# Patient Record
Sex: FEMALE | Race: WHITE | Employment: OTHER | URBAN - METROPOLITAN AREA
[De-identification: names, ages, dates, MRNs, and addresses within clinical notes are randomized per-mention and may not be internally consistent; named-entity substitution may affect disease eponyms.]

---

## 2017-08-30 ENCOUNTER — OFFICE VISIT (OUTPATIENT)
Dept: INTERNAL MEDICINE CLINIC | Age: 82
End: 2017-08-30

## 2017-08-30 ENCOUNTER — HOSPITAL ENCOUNTER (OUTPATIENT)
Dept: LAB | Age: 82
Discharge: HOME | End: 2017-08-30
Payer: MEDICARE

## 2017-08-30 VITALS
BODY MASS INDEX: 27.64 KG/M2 | RESPIRATION RATE: 14 BRPM | DIASTOLIC BLOOD PRESSURE: 76 MMHG | SYSTOLIC BLOOD PRESSURE: 164 MMHG | TEMPERATURE: 98.8 F | HEART RATE: 64 BPM | HEIGHT: 66 IN | OXYGEN SATURATION: 97 % | WEIGHT: 172 LBS

## 2017-08-30 DIAGNOSIS — E03.9 ACQUIRED HYPOTHYROIDISM: ICD-10-CM

## 2017-08-30 DIAGNOSIS — F34.1 DYSTHYMIA: Primary | ICD-10-CM

## 2017-08-30 DIAGNOSIS — Z23 ENCOUNTER FOR IMMUNIZATION: ICD-10-CM

## 2017-08-30 DIAGNOSIS — R29.6 FALLS FREQUENTLY: ICD-10-CM

## 2017-08-30 DIAGNOSIS — M25.561 ACUTE PAIN OF BOTH KNEES: ICD-10-CM

## 2017-08-30 DIAGNOSIS — M25.562 ACUTE PAIN OF BOTH KNEES: ICD-10-CM

## 2017-08-30 DIAGNOSIS — I10 ESSENTIAL HYPERTENSION: ICD-10-CM

## 2017-08-30 PROCEDURE — 80053 COMPREHEN METABOLIC PANEL: CPT

## 2017-08-30 PROCEDURE — 80061 LIPID PANEL: CPT

## 2017-08-30 PROCEDURE — 36415 COLL VENOUS BLD VENIPUNCTURE: CPT

## 2017-08-30 PROCEDURE — 84439 ASSAY OF FREE THYROXINE: CPT

## 2017-08-30 PROCEDURE — 85027 COMPLETE CBC AUTOMATED: CPT

## 2017-08-30 PROCEDURE — 84443 ASSAY THYROID STIM HORMONE: CPT

## 2017-08-30 RX ORDER — GLUCOSAMINE SULFATE 1500 MG
POWDER IN PACKET (EA) ORAL DAILY
COMMUNITY

## 2017-08-30 RX ORDER — LEVOTHYROXINE AND LIOTHYRONINE 57; 13.5 UG/1; UG/1
90 TABLET ORAL DAILY
COMMUNITY
End: 2017-08-30 | Stop reason: SDUPTHER

## 2017-08-30 RX ORDER — SERTRALINE HYDROCHLORIDE 50 MG/1
TABLET, FILM COATED ORAL
Qty: 90 TAB | Refills: 3 | Status: SHIPPED | OUTPATIENT
Start: 2017-08-30 | End: 2018-10-01 | Stop reason: SDUPTHER

## 2017-08-30 RX ORDER — LOSARTAN POTASSIUM 50 MG/1
50 TABLET ORAL 2 TIMES DAILY
COMMUNITY
End: 2017-08-30 | Stop reason: SDUPTHER

## 2017-08-30 RX ORDER — CHOLECALCIFEROL (VITAMIN D3) 25 MCG
TABLET,CHEWABLE ORAL
COMMUNITY
End: 2021-09-23

## 2017-08-30 RX ORDER — SERTRALINE HYDROCHLORIDE 50 MG/1
TABLET, FILM COATED ORAL
Refills: 0 | COMMUNITY
Start: 2017-08-22 | End: 2017-08-30 | Stop reason: SDUPTHER

## 2017-08-30 RX ORDER — LOSARTAN POTASSIUM 50 MG/1
50 TABLET ORAL 2 TIMES DAILY
Qty: 180 TAB | Refills: 3 | Status: SHIPPED | OUTPATIENT
Start: 2017-08-30 | End: 2018-11-14 | Stop reason: SDUPTHER

## 2017-08-30 RX ORDER — LEVOTHYROXINE AND LIOTHYRONINE 57; 13.5 UG/1; UG/1
90 TABLET ORAL DAILY
Qty: 90 TAB | Refills: 3 | Status: SHIPPED | OUTPATIENT
Start: 2017-08-30 | End: 2018-09-29 | Stop reason: SDUPTHER

## 2017-08-30 RX ORDER — LANOLIN ALCOHOL/MO/W.PET/CERES
3 CREAM (GRAM) TOPICAL
COMMUNITY
End: 2021-06-17

## 2017-08-30 RX ORDER — CHOLECALCIFEROL (VITAMIN D3) 125 MCG
100 CAPSULE ORAL DAILY
COMMUNITY
End: 2021-02-09 | Stop reason: ALTCHOICE

## 2017-08-30 NOTE — PROGRESS NOTES
HISTORY OF PRESENT ILLNESS  Gaviota Voss is a 80 y.o. female. HPI   Dysthymia - cont with current dose of zoloft- does not want to increase it or change it- she has been with her family and kids are helping her ; all of the children have helped  She did hurt her finger with a knife   Subjective:   Gaviota Voss is a 80 y.o. female with hypertension. Hypertension ROS: taking medications as instructed, no medication side effects noted, no TIA's, no chest pain on exertion, no dyspnea on exertion, no swelling of ankles. New concerns: has been a little higher- going to get in exercise group, art club, lunch bunch; she has undergone under a lot of stress - it has been down to 139-140 in the past     .   SUBJECTIVE: Gaviota Voss is a 80 y.o. female here for follow up of hypothyroidism. No results found for: TSH, TSH2, TSH3, TSHP, TSHEXT  Thyroid ROS: denies fatigue, weight changes, heat/cold intolerance, bowel/skin changes or CVS symptoms. She is having pain in right knee and feels like buckling and giving away and left knee joint is hurting and her left foot hurts on top of the foot and when she steps on wrong way and it bonnie   She has balance issues and she feels like her feet are numb at day - she can feel her feet during the day- they feel like has tight shoes     Review of Systems   Constitutional: Negative. Negative for chills, diaphoresis, fever, malaise/fatigue and weight loss. HENT: Negative for congestion, nosebleeds and tinnitus. Eyes: Negative for blurred vision, double vision and photophobia. Respiratory: Negative for cough, hemoptysis, sputum production, shortness of breath and wheezing. Cardiovascular: Negative for chest pain, palpitations, orthopnea, claudication, leg swelling and PND. Gastrointestinal: Negative for abdominal pain, blood in stool, constipation, diarrhea, heartburn, melena, nausea and vomiting. Genitourinary: Negative for dysuria, frequency, hematuria and urgency. Musculoskeletal: Negative for back pain, joint pain, myalgias and neck pain. Skin: Negative for itching and rash. Neurological: Negative for dizziness, tingling, sensory change, speech change, focal weakness, weakness and headaches. Endo/Heme/Allergies: Negative for polydipsia. Does not bruise/bleed easily. Psychiatric/Behavioral: Negative for depression. The patient is not nervous/anxious and does not have insomnia. Physical Exam   Constitutional: She is oriented to person, place, and time. She appears well-developed and well-nourished. HENT:   Head: Normocephalic and atraumatic. Right Ear: External ear normal.   Left Ear: External ear normal.   Nose: Nose normal.   Mouth/Throat: Oropharynx is clear and moist.   Neck: Normal range of motion. Neck supple. No JVD present. Carotid bruit is not present. No thyroid mass and no thyromegaly present. Cardiovascular: Normal rate, regular rhythm, S1 normal, S2 normal, normal heart sounds, intact distal pulses and normal pulses. Exam reveals no gallop and no friction rub. No murmur heard. Pulmonary/Chest: Effort normal and breath sounds normal.   Abdominal: Soft. Bowel sounds are normal.   Musculoskeletal: Normal range of motion. Tip of finger has a cut- no infection - but flap is open   Neurological: She is alert and oriented to person, place, and time. She has normal strength. Skin: Skin is warm and dry. Psychiatric: She has a normal mood and affect. Her behavior is normal. Judgment and thought content normal.   Nursing note and vitals reviewed. ASSESSMENT and PLAN  Diagnoses and all orders for this visit:    1. Dysthymia-cont same dose of medicine she is doing stable but we will see since the recent loss of her  of 40 years    2. Essential hypertension- at goal cont current dose of medicine   -     METABOLIC PANEL, COMPREHENSIVE  -     LIPID PANEL    3.  Acquired hypothyroidism- stable  -     TSH 3RD GENERATION  -     T4, FREE    4. Encounter for immunization  -     Tetanus, diphtheria toxoids and acellular pertussis (TDAP) vaccine, in individuals >=7 years, IM    5. Acute pain of both knees  -     REFERRAL TO ORTHOPEDIC SURGERY  -     CBC W/O DIFF    6. Falls frequently- we are going to evaluate the orthopedic to see if the knees are an issue and could be neuropathy as a cause but we will wait and see what neuro says       She did cut her finger and it is not closing so will send to hand for further eval and treatment and gave TDAP  Other orders  -     sertraline (ZOLOFT) 50 mg tablet; TAKE 1 TABLET BY MOUTH EVERY DAY  -     losartan (COZAAR) 50 mg tablet; Take 1 Tab by mouth two (2) times a day. -     thyroid, Pork, (ARMOUR THYROID) 90 mg tablet; Take 1 Tab by mouth daily.       lab results and schedule of future lab studies reviewed with patient  reviewed diet, exercise and weight control  cardiovascular risk and specific lipid/LDL goals reviewed  reviewed medications and side effects in detail

## 2017-08-31 ENCOUNTER — TELEPHONE (OUTPATIENT)
Dept: INTERNAL MEDICINE CLINIC | Age: 82
End: 2017-08-31

## 2017-08-31 DIAGNOSIS — E03.9 ACQUIRED HYPOTHYROIDISM: ICD-10-CM

## 2017-08-31 DIAGNOSIS — I10 ESSENTIAL HYPERTENSION: Primary | ICD-10-CM

## 2017-08-31 DIAGNOSIS — E78.5 DYSLIPIDEMIA: ICD-10-CM

## 2017-08-31 DIAGNOSIS — F34.1 DYSTHYMIA: ICD-10-CM

## 2017-08-31 LAB
ALBUMIN SERPL-MCNC: 4.2 G/DL (ref 3.5–4.7)
ALBUMIN/GLOB SERPL: 1.8 {RATIO} (ref 1.2–2.2)
ALP SERPL-CCNC: 75 IU/L (ref 39–117)
ALT SERPL-CCNC: 17 IU/L (ref 0–32)
AST SERPL-CCNC: 15 IU/L (ref 0–40)
BILIRUB SERPL-MCNC: 0.4 MG/DL (ref 0–1.2)
BUN SERPL-MCNC: 15 MG/DL (ref 8–27)
BUN/CREAT SERPL: 24 (ref 12–28)
CALCIUM SERPL-MCNC: 9.5 MG/DL (ref 8.7–10.3)
CHLORIDE SERPL-SCNC: 107 MMOL/L (ref 96–106)
CHOLEST SERPL-MCNC: 258 MG/DL (ref 100–199)
CO2 SERPL-SCNC: 21 MMOL/L (ref 18–29)
CREAT SERPL-MCNC: 0.62 MG/DL (ref 0.57–1)
ERYTHROCYTE [DISTWIDTH] IN BLOOD BY AUTOMATED COUNT: 13 % (ref 12.3–15.4)
GLOBULIN SER CALC-MCNC: 2.3 G/DL (ref 1.5–4.5)
GLUCOSE SERPL-MCNC: 81 MG/DL (ref 65–99)
HCT VFR BLD AUTO: 40.5 % (ref 34–46.6)
HDLC SERPL-MCNC: 59 MG/DL
HGB BLD-MCNC: 13.8 G/DL (ref 11.1–15.9)
INTERPRETATION, 910389: NORMAL
LDLC SERPL CALC-MCNC: 160 MG/DL (ref 0–99)
MCH RBC QN AUTO: 30.1 PG (ref 26.6–33)
MCHC RBC AUTO-ENTMCNC: 34.1 G/DL (ref 31.5–35.7)
MCV RBC AUTO: 88 FL (ref 79–97)
PLATELET # BLD AUTO: 218 X10E3/UL (ref 150–379)
POTASSIUM SERPL-SCNC: 4.4 MMOL/L (ref 3.5–5.2)
PROT SERPL-MCNC: 6.5 G/DL (ref 6–8.5)
RBC # BLD AUTO: 4.58 X10E6/UL (ref 3.77–5.28)
SODIUM SERPL-SCNC: 144 MMOL/L (ref 134–144)
T4 FREE SERPL-MCNC: 0.9 NG/DL (ref 0.82–1.77)
TRIGL SERPL-MCNC: 197 MG/DL (ref 0–149)
TSH SERPL DL<=0.005 MIU/L-ACNC: 0.93 UIU/ML (ref 0.45–4.5)
VLDLC SERPL CALC-MCNC: 39 MG/DL (ref 5–40)
WBC # BLD AUTO: 8.1 X10E3/UL (ref 3.4–10.8)

## 2017-08-31 NOTE — TELEPHONE ENCOUNTER
Patient daughter needs to speak with nurse regarding her mom. She had appointment yesterday.  The Daughter Stephan Johns has some question for you her no is [phone]

## 2018-01-31 ENCOUNTER — OFFICE VISIT (OUTPATIENT)
Dept: INTERNAL MEDICINE CLINIC | Age: 83
End: 2018-01-31

## 2018-01-31 ENCOUNTER — HOSPITAL ENCOUNTER (OUTPATIENT)
Dept: GENERAL RADIOLOGY | Age: 83
Discharge: HOME | End: 2018-01-31
Attending: INTERNAL MEDICINE
Payer: MEDICARE

## 2018-01-31 VITALS
SYSTOLIC BLOOD PRESSURE: 148 MMHG | DIASTOLIC BLOOD PRESSURE: 65 MMHG | HEIGHT: 66 IN | WEIGHT: 166.4 LBS | OXYGEN SATURATION: 97 % | TEMPERATURE: 97.5 F | RESPIRATION RATE: 14 BRPM | BODY MASS INDEX: 26.74 KG/M2 | HEART RATE: 78 BPM

## 2018-01-31 DIAGNOSIS — Z00.00 MEDICARE ANNUAL WELLNESS VISIT, SUBSEQUENT: Primary | ICD-10-CM

## 2018-01-31 DIAGNOSIS — J18.9 COMMUNITY ACQUIRED PNEUMONIA, UNSPECIFIED LATERALITY: ICD-10-CM

## 2018-01-31 DIAGNOSIS — M79.644 FINGER PAIN, RIGHT: ICD-10-CM

## 2018-01-31 DIAGNOSIS — I10 ESSENTIAL HYPERTENSION: ICD-10-CM

## 2018-01-31 PROCEDURE — 71046 X-RAY EXAM CHEST 2 VIEWS: CPT

## 2018-01-31 RX ORDER — DOXYCYCLINE 100 MG/1
100 CAPSULE ORAL 2 TIMES DAILY
Qty: 20 CAP | Refills: 0 | Status: SHIPPED | OUTPATIENT
Start: 2018-01-31 | End: 2018-03-20

## 2018-01-31 NOTE — PROGRESS NOTES
HISTORY OF PRESENT ILLNESS  Bob Trejo is a 80 y.o. female. Presents with daughter. HPI   Patient reports sore throat started last week. She states she developed sinus congestion, cough, chest congestion. Patient admits to fluctuations in body temperature. She denies having good energy levels or appetite. She notes she has been drinking Ensure. Patient states she was in bed for 3 days because she was not feeling well and the fatigue. Hypertension ROS: taking medications as instructed, no medication side effects noted, no TIA's, no chest pain on exertion, no dyspnea on exertion, no swelling of ankles. New concerns:  Patient's BP in office today is 148/65. Patient reports she fell in lunch room and split open finger in October. She notes she had it bandaged and it healed. Patient notes some pains in her finger. She states she is unable to close her fingers. Review of Systems   HENT: Positive for congestion, ear pain, sinus pain and sore throat. Respiratory: Positive for cough and wheezing. All other systems reviewed and are negative. Physical Exam   Constitutional: She is oriented to person, place, and time. She appears well-developed and well-nourished. HENT:   Head: Normocephalic and atraumatic. Right Ear: External ear normal.   Left Ear: External ear normal.   Nose: Nose normal.   Mouth/Throat: Oropharynx is clear and moist.   Erythema and fluid in both ears. Eyes: Conjunctivae and EOM are normal.   Neck: Normal range of motion. Neck supple. Carotid bruit is not present. No thyroid mass and no thyromegaly present. Cardiovascular: Normal rate, regular rhythm, S1 normal, S2 normal, normal heart sounds and intact distal pulses. Pulmonary/Chest: Effort normal and breath sounds normal.   Tyler expiratory wheezes and rhonchi. Abdominal: Soft. Normal appearance and bowel sounds are normal. There is no hepatosplenomegaly. There is no tenderness.    Musculoskeletal: Normal range of motion. Neurological: She is alert and oriented to person, place, and time. She has normal strength. No cranial nerve deficit or sensory deficit. Coordination normal.   Skin: Skin is warm, dry and intact. No abrasion and no rash noted. Psychiatric: She has a normal mood and affect. Her behavior is normal. Judgment and thought content normal.   Nursing note and vitals reviewed. ASSESSMENT and PLAN  Diagnoses and all orders for this visit:    1. Community acquired pneumonia, unspecified laterality  Strongly suspicious of pneumonia, but will rule out with chest XR. Will treat with   -     XR CHEST PA LAT; Future    2. Essential hypertension  /65 today in office. I do not recommend any change in medications. Suspect BP high because patient not feeling well.   -     doxycycline (MONODOX) 100 mg capsule; Take 1 Cap by mouth two (2) times a day. 3. Finger pain, right  Patient will follow up with orthopedic to discuss finger pains.   -     REFERRAL TO ORTHOPEDIC SURGERY    lab results and schedule of future lab studies reviewed with patient  reviewed diet, exercise and weight control    Written by Freddie Flores, as dictated by Dylan Landry MD.     Current diagnosis and concerns discussed with pt at length. Understands risks and benefits or current treatment plan and medications and accepts the treatment and medication with any possible risks. Pt asks appropriate questions which were answered. Pt instructed to call with any concerns or problems. This is a Subsequent Medicare Annual Wellness Exam (AWV) (Performed 12 months after IPPE or effective date of Medicare Part B enrollment)    I have reviewed the patient's medical history in detail and updated the computerized patient record.      History     Past Medical History:   Diagnosis Date    Depression     Hypercholesterolemia     Thyroid disease       Past Surgical History:   Procedure Laterality Date    HX ROTATOR CUFF REPAIR  HX TONSILLECTOMY       Current Outpatient Prescriptions   Medication Sig Dispense Refill    doxycycline (MONODOX) 100 mg capsule Take 1 Cap by mouth two (2) times a day. 20 Cap 0    cholecalciferol (VITAMIN D3) 1,000 unit cap Take  by mouth daily.  KRILL OIL PO Take  by mouth.  cyanocobalamin, vitamin B-12, 2,500 mcg tab Take  by mouth.  co-enzyme Q-10 (CO Q-10) 100 mg capsule Take 100 mg by mouth daily.  melatonin 3 mg tablet Take  by mouth.  sertraline (ZOLOFT) 50 mg tablet TAKE 1 TABLET BY MOUTH EVERY DAY 90 Tab 3    losartan (COZAAR) 50 mg tablet Take 1 Tab by mouth two (2) times a day. 180 Tab 3    thyroid, Pork, (ARMOUR THYROID) 90 mg tablet Take 1 Tab by mouth daily. 90 Tab 3     Allergies   Allergen Reactions    Demerol [Meperidine] Other (comments)    Pcn [Penicillins] Hives     Family History   Problem Relation Age of Onset    Stroke Mother     Cancer Father      Brain & Lung    Diabetes Sister     Stroke Sister     Diabetes Brother     Diabetes Maternal Grandmother     Cancer Sister     Heart Disease Brother      Social History   Substance Use Topics    Smoking status: Never Smoker    Smokeless tobacco: Never Used    Alcohol use Yes     Patient Active Problem List   Diagnosis Code    Essential hypertension I10    Dysthymia F34.1    Dyslipidemia E78.5    Acquired hypothyroidism E03.9       Depression Risk Factor Screening:   No flowsheet data found. Alcohol Risk Factor Screening: You do not drink alcohol or very rarely. Functional Ability and Level of Safety:   Hearing Loss  Hearing is good. Activities of Daily Living  The home contains: handrails and grab bars  Patient does total self care    Fall Risk  No flowsheet data found.     Abuse Screen  Patient is not abused    Cognitive Screening   Evaluation of Cognitive Function:  Has your family/caregiver stated any concerns about your memory: no  Normal    Patient Care Team   Patient Care Team:  Truong Nieto Briana Hampton MD as PCP - General (Internal Medicine)    Assessment/Plan   Education and counseling provided:  Are appropriate based on today's review and evaluation  End-of-Life planning (with patient's consent)    Diagnoses and all orders for this visit:    1. Community acquired pneumonia, unspecified laterality  -     XR CHEST PA LAT; Future    2. Essential hypertension  -     doxycycline (MONODOX) 100 mg capsule; Take 1 Cap by mouth two (2) times a day.     3. Finger pain, right  -     REFERRAL TO ORTHOPEDIC SURGERY        Health Maintenance Due   Topic Date Due    ZOSTER VACCINE AGE 60>  07/24/1993    GLAUCOMA SCREENING Q2Y  09/24/1998    OSTEOPOROSIS SCREENING (DEXA)  09/24/1998    Pneumococcal 65+ Low/Medium Risk (1 of 2 - PCV13) 09/24/1998    MEDICARE YEARLY EXAM  09/24/1998    Influenza Age 9 to Adult  08/01/2017

## 2018-01-31 NOTE — PATIENT INSTRUCTIONS

## 2018-03-22 ENCOUNTER — OFFICE VISIT (OUTPATIENT)
Dept: INTERNAL MEDICINE CLINIC | Age: 83
End: 2018-03-22

## 2018-03-22 ENCOUNTER — HOSPITAL ENCOUNTER (OUTPATIENT)
Dept: LAB | Age: 83
Discharge: HOME | End: 2018-03-22
Payer: MEDICARE

## 2018-03-22 VITALS
SYSTOLIC BLOOD PRESSURE: 142 MMHG | DIASTOLIC BLOOD PRESSURE: 74 MMHG | RESPIRATION RATE: 12 BRPM | OXYGEN SATURATION: 98 % | HEART RATE: 69 BPM | WEIGHT: 162.2 LBS | HEIGHT: 65 IN | BODY MASS INDEX: 27.02 KG/M2 | TEMPERATURE: 98.3 F

## 2018-03-22 DIAGNOSIS — I83.90 VARICOSITIES OF LEG: ICD-10-CM

## 2018-03-22 DIAGNOSIS — R22.31 MASS OF SKIN OF FINGER OF RIGHT HAND: Primary | ICD-10-CM

## 2018-03-22 DIAGNOSIS — Z01.818 PRE-OPERATIVE CLEARANCE: ICD-10-CM

## 2018-03-22 DIAGNOSIS — I10 ESSENTIAL HYPERTENSION: ICD-10-CM

## 2018-03-22 PROCEDURE — 80048 BASIC METABOLIC PNL TOTAL CA: CPT

## 2018-03-22 NOTE — MR AVS SNAPSHOT
[facility] 
 
 
 [address] [place] [address] [facility] 
[phone] Patient: Truman Garcia MRN: [id number] Visit Information Date & Time Provider Department Dept. Phone Encounter #  
 3/22/2018 10:00 AM Aleida Mercedes NP [facility] [address] [id number] Upcoming Health Maintenance Date Due ZOSTER VACCINE AGE 60> 7/24/1993 GLAUCOMA SCREENING Q2Y 9/24/1998 Bone Densitometry (Dexa) Screening 9/24/1998 Pneumococcal 65+ Low/Medium Risk (1 of 2 - PCV13) 9/24/1998 Influenza Age 5 to Adult 8/1/2017 MEDICARE YEARLY EXAM 2/1/2019 DTaP/Tdap/Td series (2 - Td) 8/30/2027 Allergies as of 3/22/2018  Review Complete On: 3/22/2018 By: Aleida Mercedes NP Severity Noted Reaction Type Reactions Demerol [Meperidine]  01/31/2018    Other (comments) Doesn't remember but states reaction was when she had first child Pcn [Penicillins]  08/30/2017    Hives Current Immunizations  Never Reviewed Name Date Tdap 8/30/2017 Not reviewed this visit You Were Diagnosed With   
  
 Codes Comments Mass of skin of finger of right hand    -  Primary ICD-10-CM: R22.31 
ICD-9-CM: 782.2 Pre-operative clearance     ICD-10-CM: E41.015 ICD-9-CM: V72.84 Essential hypertension     ICD-10-CM: I10 
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  
 26.99 kg/m 2 1.84 m 2 Preferred Pharmacy Pharmacy Name Phone [facility] [phone] Your Updated Medication List  
  
   
This list is accurate as of 3/22/18 10:53 AM.  Always use your most recent med list.  
  
  
  
  
 CO Q-10 100 mg capsule Generic drug:  co-enzyme Q-10 Take 100 mg by mouth daily. cyanocobalamin (vitamin B-12) 2,500 mcg Tab Take  by mouth. KRILL OIL PO Take 1 Cap by mouth daily. losartan 50 mg tablet Commonly known as:  COZAAR Take 1 Tab by mouth two (2) times a day. melatonin 3 mg tablet Take 3 mg by mouth nightly. sertraline 50 mg tablet Commonly known as:  ZOLOFT  
TAKE 1 TABLET BY MOUTH EVERY DAY  
  
 thyroid (Pork) 90 mg tablet Commonly known as:  ARMOUR THYROID Take 1 Tab by mouth daily. VITAMIN D3 1,000 unit Cap Generic drug:  cholecalciferol Take  by mouth daily. We Performed the Following AMB POC EKG ROUTINE W/ 12 LEADS, INTER & REP [85134 CPT(R)] METABOLIC PANEL, BASIC [18003 CPT(R)] REFERRAL TO VASCULAR SURGERY [ZFQ511 Custom] Referral Information Referral ID Referred By Referred To  
  
 [id number] Kenneth Machado MD   
   [facility] [address] Phone: [phone] Fax: [phone] Visits Status Start Date End Date 1 New Request 3/22/18 3/22/19 If your referral has a status of pending review or denied, additional information will be sent to support the outcome of this decision. Patient Instructions Varicose Veins: Care Instructions Your Care Instructions Varicose veins are twisted, enlarged veins near the surface of the skin. They develop most often in the legs and ankles. Some people may be more likely than others to get varicose veins because of aging or hormone changes or because a parent has them.  Being overweight or pregnant can make varicose veins worse. Jobs that require standing for long periods of time also can make them worse. Follow-up care is a key part of your treatment and safety. Be sure to make and go to all appointments, and call your doctor if you are having problems. It's also a good idea to know your test results and keep a list of the medicines you take. How can you care for yourself at home? · Wear compression stockings during the day to help relieve symptoms. They improve blood flow and are the main treatment for varicose veins. Talk to your doctor about which ones to get and where to get them. · Prop up your legs at or above the level of your heart when possible. This helps keep the blood from pooling in your lower legs and improves blood flow to the rest of your body. · Avoid sitting and standing for long periods. This puts added stress on your veins. · Get regular exercise, and control your weight. Walk, bicycle, or swim to improve blood flow in your legs. · If you bump your leg so hard that you know it is likely to bruise, prop up your leg and put ice or a cold pack on the area for 10 to 20 minutes at a time. Try to do this every 1 to 2 hours for the next 3 days (when you are awake) or until the swelling goes down. Put a thin cloth between the ice and your skin. · If you cut or scratch the skin over a vein, it may bleed a lot. Prop up your leg and apply firm pressure with a clean bandage over the site of the bleeding. Continue to apply pressure for a full 15 minutes. Do not check sooner to see if the bleeding has stopped. If the bleeding has not stopped after 15 minutes, apply pressure again for another 15 minutes. You can repeat this up to 3 times for a total of 45 minutes. If you have a blood clot in a varicose vein, you may have tenderness and swelling over the vein. The vein may feel firm. Be sure to call your doctor right away if you have these symptoms.  If your doctor has told you how to care for the clot, follow his or her instructions. Care may include the following: · Prop up your leg and apply heat with a warm, damp cloth or a heating pad set on low (put a towel or cloth between your leg and the heating pad to prevent burns). · Ask your doctor if you can take an over-the-counter pain medicine, such as acetaminophen (Tylenol), ibuprofen (Advil, Motrin), or naproxen (Aleve). Be safe with medicines. Read and follow all instructions on the label. When should you call for help? Call 911 anytime you think you may need emergency care. For example, call if: 
? · You have sudden chest pain and shortness of breath, or you cough up blood. ?Call your doctor now or seek immediate medical care if: 
? · You have signs of a blood clot, such as: 
¨ Pain in your calf, back of the knee, thigh, or groin. ¨ Redness and swelling in your leg or groin. ? · A varicose vein begins to bleed and you cannot stop it. ? · You have a tender lump in your leg. ? · You get an open sore. ? Watch closely for changes in your health, and be sure to contact your doctor if: 
? · Your varicose vein symptoms do not improve with home treatment. Where can you learn more? Go to http://torres-gabrielle.info/. Enter W477 in the search box to learn more about \"Varicose Veins: Care Instructions. \" Current as of: March 20, 2017 Content Version: 11.4 © 0735-5725 [email or web address]. Care instructions adapted under license by [employer] (which disclaims liability or warranty for this information). If you have questions about a medical condition or this instruction, always ask your healthcare professional. [place] [address] any warranty or liability for your use of this information. Introducing [facility]!    
 Joslyn Denton introduces [employer] patient portal. Now you can access parts of your medical record, email your doctor's office, and request medication refills online. 1. In your internet browser, go to [email or web address] 2. Click on the First Time User? Click Here link in the Sign In box. You will see the New Member Sign Up page. 3. Enter your [employer] Access Code exactly as it appears below. You will not need to use this code after youve completed the sign-up process. If you do not sign up before the expiration date, you must request a new code. · [employer] Access Code: [email or web address] Expires: 6/20/2018 10:53 AM 
 
4. Enter the last four digits of your Social Security Number (xxxx) and Date of Birth (mm/dd/yyyy) as indicated and click Submit. You will be taken to the next sign-up page. 5. Create a Solio ID. This will be your [employer] login ID and cannot be changed, so think of one that is secure and easy to remember. 6. Create a [employer] password. You can change your password at any time. 7. Enter your Password Reset Question and Answer. This can be used at a later time if you forget your password. 8. Enter your e-mail address. You will receive e-mail notification when new information is available in [address] 9. Click Sign Up. You can now view and download portions of your medical record. 10. Click the Download Summary menu link to download a portable copy of your medical information. If you have questions, please visit the Frequently Asked Questions section of the [employer] website. Remember, [employer] is NOT to be used for urgent needs. For medical emergencies, dial 911. Now available from your iPhone and Android! Please provide this summary of care documentation to your next provider. Your primary care clinician is listed as Rosalinda Manley. If you have any questions after today's visit, please call [phone].

## 2018-03-22 NOTE — PATIENT INSTRUCTIONS
Varicose Veins: Care Instructions  Your Care Instructions  Varicose veins are twisted, enlarged veins near the surface of the skin. They develop most often in the legs and ankles. Some people may be more likely than others to get varicose veins because of aging or hormone changes or because a parent has them. Being overweight or pregnant can make varicose veins worse. Jobs that require standing for long periods of time also can make them worse. Follow-up care is a key part of your treatment and safety. Be sure to make and go to all appointments, and call your doctor if you are having problems. It's also a good idea to know your test results and keep a list of the medicines you take. How can you care for yourself at home? · Wear compression stockings during the day to help relieve symptoms. They improve blood flow and are the main treatment for varicose veins. Talk to your doctor about which ones to get and where to get them. · Prop up your legs at or above the level of your heart when possible. This helps keep the blood from pooling in your lower legs and improves blood flow to the rest of your body. · Avoid sitting and standing for long periods. This puts added stress on your veins. · Get regular exercise, and control your weight. Walk, bicycle, or swim to improve blood flow in your legs. · If you bump your leg so hard that you know it is likely to bruise, prop up your leg and put ice or a cold pack on the area for 10 to 20 minutes at a time. Try to do this every 1 to 2 hours for the next 3 days (when you are awake) or until the swelling goes down. Put a thin cloth between the ice and your skin. · If you cut or scratch the skin over a vein, it may bleed a lot. Prop up your leg and apply firm pressure with a clean bandage over the site of the bleeding. Continue to apply pressure for a full 15 minutes. Do not check sooner to see if the bleeding has stopped.  If the bleeding has not stopped after 15 minutes, apply pressure again for another 15 minutes. You can repeat this up to 3 times for a total of 45 minutes. If you have a blood clot in a varicose vein, you may have tenderness and swelling over the vein. The vein may feel firm. Be sure to call your doctor right away if you have these symptoms. If your doctor has told you how to care for the clot, follow his or her instructions. Care may include the following:  · Prop up your leg and apply heat with a warm, damp cloth or a heating pad set on low (put a towel or cloth between your leg and the heating pad to prevent burns). · Ask your doctor if you can take an over-the-counter pain medicine, such as acetaminophen (Tylenol), ibuprofen (Advil, Motrin), or naproxen (Aleve). Be safe with medicines. Read and follow all instructions on the label. When should you call for help? Call 911 anytime you think you may need emergency care. For example, call if:  ? · You have sudden chest pain and shortness of breath, or you cough up blood. ?Call your doctor now or seek immediate medical care if:  ? · You have signs of a blood clot, such as:  ¨ Pain in your calf, back of the knee, thigh, or groin. ¨ Redness and swelling in your leg or groin. ? · A varicose vein begins to bleed and you cannot stop it. ? · You have a tender lump in your leg. ? · You get an open sore. ? Watch closely for changes in your health, and be sure to contact your doctor if:  ? · Your varicose vein symptoms do not improve with home treatment. Where can you learn more? Go to http://torres-gabrielle.info/. Enter B536 in the search box to learn more about \"Varicose Veins: Care Instructions. \"  Current as of: March 20, 2017  Content Version: 11.4  © 8530-9225 [employer]. Care instructions adapted under license by [employer] (which disclaims liability or warranty for this information).  If you have questions about a medical condition or this instruction, always ask your healthcare professional. [place] [address] any warranty or liability for your use of this information.

## 2018-03-22 NOTE — PROGRESS NOTES
HISTORY OF PRESENT ILLNESS  Kellee Beckham is a 80 y.o. female. HPI  Kellee Beckham was referred for evaluation by:Dr. Mary Iverson for Pre- Op Evaluation. Please see encounter details and orders for consultative summary. Type of surgery : Right index finger mass excision  Surgery site : Right index finger  Anesthesia type: Local/MAC  Date of procedure:  3/30/2018    Allergies: Allergies   Allergen Reactions    Demerol [Meperidine] Other (comments)     Doesn't remember but states reaction was when she had first child    Pcn [Penicillins] Hives     Latex allergy: no  Prior reactions to anesthesia:  Slow to arouse in past.    Functional status:  she is able to ambulate up 2 flights of step without shortness of breath, chest pain  Prior cardiac evaluation:   None    Subjective:   Kellee Beckham is a 80 y.o. female with hypertension. Hypertension ROS: taking medications as instructed, no medication side effects noted, no TIA's, no chest pain on exertion, no dyspnea on exertion, no swelling of ankles. New concerns: has occasional brief lightheadedness when changing positions abruptly. Complains of bilateral lower leg aching and sense of heaviness after walking short distances; has had issues with significant various veins for years but seem to be more uncomfortable over the past year. Has not seen any medical provider for this recently; denies ankle edema; wears compression hose occasionally. Has noted that feet seem more cold and has noted some transient numbness and tingling in lower legs. Denies leg weakness. Able to walk distances without pain in lower legs but has sensation of heaviness and aching after activity. Current Outpatient Prescriptions   Medication Sig    cholecalciferol (VITAMIN D3) 1,000 unit cap Take  by mouth daily.  KRILL OIL PO Take 1 Cap by mouth daily.  cyanocobalamin, vitamin B-12, 2,500 mcg tab Take  by mouth.     co-enzyme Q-10 (CO Q-10) 100 mg capsule Take 100 mg by mouth daily.    melatonin 3 mg tablet Take 3 mg by mouth nightly.  sertraline (ZOLOFT) 50 mg tablet TAKE 1 TABLET BY MOUTH EVERY DAY    losartan (COZAAR) 50 mg tablet Take 1 Tab by mouth two (2) times a day.  thyroid, Pork, (ARMOUR THYROID) 90 mg tablet Take 1 Tab by mouth daily. No current facility-administered medications for this visit. Past Medical History:   Diagnosis Date    Depression     Hypercholesterolemia     Thyroid disease     hypothyroid     Past Surgical History:   Procedure Laterality Date    HX CATARACT REMOVAL Bilateral     HX DILATION AND CURETTAGE      more than once-can't remember    HX ORTHOPAEDIC Bilateral     sena's neuroma    HX ROTATOR CUFF REPAIR Right     HX TONSILLECTOMY      HX UROLOGICAL      \"bladder lift\"     Social History   Substance Use Topics    Smoking status: Never Smoker    Smokeless tobacco: Never Used    Alcohol use No       Blood pressure 142/74, pulse 69, temperature 98.3 °F (36.8 °C), temperature source Oral, resp. rate 12, height 5' 5\" (1.651 m), weight 162 lb 3.2 oz (73.6 kg), SpO2 98 %. Review of Systems   Constitutional: Negative for chills, fever and malaise/fatigue. HENT: Negative for congestion. Respiratory: Negative for cough, shortness of breath and wheezing. Cardiovascular: Negative for chest pain, palpitations and leg swelling. Musculoskeletal: Positive for joint pain (right index finger) and myalgias (bilateral lower leg aching). Neurological: Positive for dizziness (occasional) and tingling. Negative for headaches. Psychiatric/Behavioral: Negative for depression. The patient is not nervous/anxious. Physical Exam   Constitutional: She is oriented to person, place, and time. She appears well-developed and well-nourished. HENT:   Head: Normocephalic and atraumatic.    Right Ear: External ear normal.   Left Ear: External ear normal.   Nose: Nose normal.   Mouth/Throat: Oropharynx is clear and moist.   Eyes: Conjunctivae are normal.   Neck: Normal range of motion. Neck supple. No thyromegaly present. Cardiovascular: Normal rate, regular rhythm and normal heart sounds. Pulmonary/Chest: Effort normal and breath sounds normal. She has no wheezes. She has no rales. Abdominal: Soft. Bowel sounds are normal. There is no tenderness. There is no rebound and no guarding. Musculoskeletal:        Hands:  Extensive varicosities bilateral lower legs; distal pulses WNL    Right index finger mass distal to PIP joint   Lymphadenopathy:     She has no cervical adenopathy. Neurological: She is alert and oriented to person, place, and time. No cranial nerve deficit. Skin: Skin is warm and dry. Psychiatric: She has a normal mood and affect. Her behavior is normal.   Nursing note and vitals reviewed. ASSESSMENT and PLAN  Diagnoses and all orders for this visit:    1. Mass of skin of finger of right hand    2. Pre-operative clearance - considered medically stable for upcoming Excision of right index finger mass with Local and MAC anesthesia  -     AMB POC EKG ROUTINE W/ 12 LEADS, INTER & REP  -     METABOLIC PANEL, BASIC    3. Essential hypertension --stable  -     METABOLIC PANEL, BASIC    4. Varicosities of leg-- will refer to vascular surgery for further evaluation in light of increasing discomfort.   Does not appear to be experiencing claudication.  -     REFERRAL TO VASCULAR SURGERY      lab results and schedule of future lab studies reviewed with patient  reviewed diet, exercise and weight control  reviewed medications and side effects in detail

## 2018-03-23 LAB
BUN SERPL-MCNC: 16 MG/DL (ref 8–27)
BUN/CREAT SERPL: 23 (ref 12–28)
CALCIUM SERPL-MCNC: 9.3 MG/DL (ref 8.7–10.3)
CHLORIDE SERPL-SCNC: 105 MMOL/L (ref 96–106)
CO2 SERPL-SCNC: 26 MMOL/L (ref 18–29)
CREAT SERPL-MCNC: 0.71 MG/DL (ref 0.57–1)
GFR SERPLBLD CREATININE-BSD FMLA CKD-EPI: 78 ML/MIN/1.73
GFR SERPLBLD CREATININE-BSD FMLA CKD-EPI: 90 ML/MIN/1.73
GLUCOSE SERPL-MCNC: 73 MG/DL (ref 65–99)
POTASSIUM SERPL-SCNC: 4 MMOL/L (ref 3.5–5.2)
SODIUM SERPL-SCNC: 145 MMOL/L (ref 134–144)

## 2018-04-12 ENCOUNTER — OFFICE VISIT (OUTPATIENT)
Dept: INTERNAL MEDICINE CLINIC | Age: 83
End: 2018-04-12

## 2018-04-12 ENCOUNTER — HOSPITAL ENCOUNTER (OUTPATIENT)
Dept: GENERAL RADIOLOGY | Age: 83
Discharge: HOME | End: 2018-04-12
Payer: MEDICARE

## 2018-04-12 VITALS
RESPIRATION RATE: 12 BRPM | DIASTOLIC BLOOD PRESSURE: 82 MMHG | OXYGEN SATURATION: 98 % | HEIGHT: 65 IN | SYSTOLIC BLOOD PRESSURE: 146 MMHG | WEIGHT: 158 LBS | HEART RATE: 58 BPM | TEMPERATURE: 97.7 F | BODY MASS INDEX: 26.33 KG/M2

## 2018-04-12 DIAGNOSIS — G43.109 OPHTHALMIC MIGRAINE: ICD-10-CM

## 2018-04-12 DIAGNOSIS — M79.671 CHRONIC FOOT PAIN, RIGHT: ICD-10-CM

## 2018-04-12 DIAGNOSIS — G89.29 CHRONIC RIGHT HIP PAIN: Primary | ICD-10-CM

## 2018-04-12 DIAGNOSIS — G89.29 CHRONIC FOOT PAIN, RIGHT: ICD-10-CM

## 2018-04-12 DIAGNOSIS — M25.551 CHRONIC RIGHT HIP PAIN: ICD-10-CM

## 2018-04-12 DIAGNOSIS — G89.29 CHRONIC RIGHT HIP PAIN: ICD-10-CM

## 2018-04-12 DIAGNOSIS — M25.551 CHRONIC RIGHT HIP PAIN: Primary | ICD-10-CM

## 2018-04-12 PROCEDURE — 73502 X-RAY EXAM HIP UNI 2-3 VIEWS: CPT

## 2018-04-12 PROCEDURE — 73630 X-RAY EXAM OF FOOT: CPT

## 2018-04-12 NOTE — PROGRESS NOTES
HISTORY OF PRESENT ILLNESS  Luc Morin is a 80 y.o. female. HPI  Patient of Dr Cecil Guallpa who presents with complaints of right hip pain that has been constant for the past 3 weeks; pain begins in right hip joint and radiates into right groin area. Denies any specific injury but has been doing some new exercises. Denies warmth or erythema to area. Denies prior hip issues. Has not taken any OTC medications for current symptoms. Also complains of intermittent pain to right forefoot over the past 2-3 months. 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  Has not noted any edema or erythema to area. Pain worsens with pressure and weightbearing. Noted transient flashing light in left eye this am followed by some tunneling of vision left eye that lasted for several minutes before subsiding and then developed left sided headache. Denies dizziness, vertigo, slurring of speech, dysphagia, weakness. 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 Review of Systems   Constitutional: Negative for chills, fever and malaise/fatigue. HENT: Negative for congestion. Respiratory: Negative for cough and shortness of breath. Cardiovascular: Negative for chest pain, palpitations and leg swelling. Gastrointestinal: Negative for abdominal pain, constipation, diarrhea, nausea and vomiting. Genitourinary: Negative for dysuria and frequency. Musculoskeletal: Positive for joint pain (right hip, right foot). Negative for myalgias. Neurological: Positive for headaches. Negative for dizziness, tingling, focal weakness, seizures and loss of consciousness.      /82 (BP 1 Location: Left arm, BP Patient Position: Sitting)  Pulse (!) 58  Temp 97.7 °F (36.5 °C) (Oral)   Resp 12  Ht 5' 5\" (1.651 m)  Wt 158 lb (71.7 kg) Comment: per pt  SpO2 98%  BMI 26.29 kg/m2  Physical Exam   Constitutional: She is oriented to person, place, and time. She appears well-developed and well-nourished. No distress. HENT:   Head: Normocephalic and atraumatic. Right Ear: External ear normal.   Left Ear: External ear normal.   Nose: Nose normal.   Mouth/Throat: Oropharynx is clear and moist.   Eyes: Conjunctivae are normal. Pupils are equal, round, and reactive to light. Neck: Normal range of motion. Neck supple. No thyromegaly present. Cardiovascular: Normal rate, regular rhythm and normal heart sounds. Pulmonary/Chest: Effort normal and breath sounds normal.   Musculoskeletal:        Right hip: She exhibits decreased range of motion and bony tenderness. Legs:       Right foot: There is tenderness. Feet:    Tenderness right hip trochanteric bursa; mild tenderness to palpation of right forefoot   Lymphadenopathy:     She has no cervical adenopathy. Neurological: She is alert and oriented to person, place, and time. No cranial nerve deficit. Coordination normal.   Psychiatric: She has a normal mood and affect. Her behavior is normal.   Nursing note and vitals reviewed. ASSESSMENT and PLAN  Diagnoses and all orders for this visit:    1. Chronic right hip pain -- will check xrays for possible arthritis; can take OTC Aleve 1 tab twice daily with food for several days; stretching exercises. -     XR HIP RT W OR WO PELV 2-3 VWS; Future    2. Chronic foot pain, right  -     XR FOOT RT MIN 3 V; Future    3. Ophthalmic migraine -- symptoms easing; advised to notify office if symptoms recur or she develops any other symptoms. reviewed diet, exercise and weight control  reviewed medications and side effects in detail  radiology results and schedule of future radiology studies reviewed with patient  This note will not be viewable in [address]

## 2018-04-12 NOTE — MR AVS SNAPSHOT
[facility] 
 
 
 [address] 
[phone] Patient: Luis Antonio Phillips MRN: [id number] BSW:[id number] Visit Information Date & Time Provider Department Dept. Phone Encounter #  
 4/12/2018 11:40 AM Jose Francisco Kwok NP [facility] [address] [id number] Upcoming Health Maintenance Date Due ZOSTER VACCINE AGE 60> 7/24/1993 GLAUCOMA SCREENING Q2Y 9/24/1998 Bone Densitometry (Dexa) Screening 9/24/1998 Pneumococcal 65+ Low/Medium Risk (1 of 2 - PCV13) 9/24/1998 Influenza Age 5 to Adult 8/1/2017 MEDICARE YEARLY EXAM 2/1/2019 DTaP/Tdap/Td series (2 - Td) 8/30/2027 Allergies as of 4/12/2018  Review Complete On: 4/12/2018 By: Jose Francisco Kwok NP Severity Noted Reaction Type Reactions Demerol [Meperidine]  01/31/2018    Other (comments) Doesn't remember but states reaction was when she had first child Pcn [Penicillins]  08/30/2017    Hives Current Immunizations  Never Reviewed Name Date Tdap 8/30/2017 Not reviewed this visit You Were Diagnosed With   
  
 Codes Comments Chronic right hip pain    -  Primary ICD-10-CM: M25.551, G89.29 ICD-9-CM: 719.45, 338.29 Chronic foot pain, right     ICD-10-CM: M79.671, G89.29 ICD-9-CM: 729.5, 338.29 Ophthalmic migraine     ICD-10-CM: V83.649 ICD-9-CM: 346.80 Vitals BP Pulse Temp Resp Height(growth percentile) Weight(growth percentile) 146/82 (BP 1 Location: Left arm, BP Patient Position: Sitting) (!) 58 97.7 °F (36.5 °C) (Oral) 12 5' 5\" (1.651 m) 158 lb (71.7 kg) SpO2 BMI OB Status Smoking Status 98% 26.29 kg/m2 Postmenopausal Never Smoker Vitals History BMI and BSA Data Body Mass Index Body Surface Area  
 26.29 kg/m 2 1.81 m 2 Preferred Pharmacy Pharmacy Name Phone [facility] [address] [phone] Your Updated Medication List  
  
   
This list is accurate as of 4/12/18 12:03 PM.  Always use your most recent med list.  
  
  
  
  
 CO Q-10 100 mg capsule Generic drug:  co-enzyme Q-10 Take 100 mg by mouth daily. cyanocobalamin (vitamin B-12) 2,500 mcg Tab Take  by mouth. KRILL OIL PO Take 1 Cap by mouth daily. losartan 50 mg tablet Commonly known as:  COZAAR Take 1 Tab by mouth two (2) times a day. melatonin 3 mg tablet Take 3 mg by mouth nightly. sertraline 50 mg tablet Commonly known as:  ZOLOFT  
TAKE 1 TABLET BY MOUTH EVERY DAY  
  
 thyroid (Pork) 90 mg tablet Commonly known as:  ARMOUR THYROID Take 1 Tab by mouth daily. VITAMIN D3 1,000 unit Cap Generic drug:  cholecalciferol Take  by mouth daily. To-Do List   
 04/12/2018 Imaging:  XR FOOT RT MIN 3 V   
  
 04/12/2018 Imaging:  XR HIP RT W OR WO PELV 2-3 VWS Patient Instructions Foot Pain: Care Instructions Your Care Instructions Foot injuries that cause pain and swelling are fairly common. Almost all sports or home repair projects can cause a misstep that ends up as foot pain. Normal wear and tear, especially as you get older, also can cause foot pain. Most minor foot injuries will heal on their own, and home treatment is usually all you need to do. If you have a severe injury, you may need tests and treatment. Follow-up care is a key part of your treatment and safety. Be sure to make and go to all appointments, and call your doctor if you are having problems. It's also a good idea to know your test results and keep a list of the medicines you take. How can you care for yourself at home? · Take pain medicines exactly as directed. ¨ If the doctor gave you a prescription medicine for pain, take it as prescribed. ¨ If you are not taking a prescription pain medicine, ask your doctor if you can take an over-the-counter medicine. · Rest and protect your foot. Take a break from any activity that may cause pain. · Put ice or a cold pack on your foot for 10 to 20 minutes at a time. Put a thin cloth between the ice and your skin. · Prop up the sore foot on a pillow when you ice it or anytime you sit or lie down during the next 3 days. Try to keep it above the level of your heart. This will help reduce swelling. · Your doctor may recommend that you wrap your foot with an elastic bandage. Keep your foot wrapped for as long as your doctor advises. · If your doctor recommends crutches, use them as directed. · Wear roomy footwear. · As soon as pain and swelling end, begin gentle exercises of your foot. Your doctor can tell you which exercises will help. When should you call for help? Call 911 anytime you think you may need emergency care. For example, call if: 
? · Your foot turns pale, white, blue, or cold. ?Call your doctor now or seek immediate medical care if: 
? · You cannot move or stand on your foot. ? · Your foot looks twisted or out of its normal position. ? · Your foot is not stable when you step down. ? · You have signs of infection, such as: 
¨ Increased pain, swelling, warmth, or redness. ¨ Red streaks leading from the sore area. ¨ Pus draining from a place on your foot. ¨ A fever. ? · Your foot is numb or tingly. ? Watch closely for changes in your health, and be sure to contact your doctor if: 
? · You do not get better as expected. ? · You have bruises from an injury that last longer than 2 weeks. Where can you learn more? Go to http://torres-gabrielle.info/. Enter M122 in the search box to learn more about \"Foot Pain: Care Instructions. \" Current as of: March 21, 2017 Content Version: 11.4 © 5243-0066 mention.  Care instructions adapted under license by [employer] (which disclaims liability or warranty for this information). If you have questions about a medical condition or this instruction, always ask your healthcare professional. [address] any warranty or liability for your use of this information. Hip Pain: Care Instructions Your Care Instructions Hip pain may be caused by many things, including overuse, a fall, or a twisting movement. Another cause of hip pain is arthritis. Your pain may increase when you stand up, walk, or squat. The pain may come and go or may be constant. Home treatment can help relieve hip pain, swelling, and stiffness. If your pain is ongoing, you may need more tests and treatment. Follow-up care is a key part of your treatment and safety. Be sure to make and go to all appointments, and call your doctor if you are having problems. It's also a good idea to know your test results and keep a list of the medicines you take. How can you care for yourself at home? · Take pain medicines exactly as directed. ¨ If the doctor gave you a prescription medicine for pain, take it as prescribed. ¨ If you are not taking a prescription pain medicine, ask your doctor if you can take an over-the-counter medicine. · Rest and protect your hip. Take a break from any activity, including standing or walking, that may cause pain. · Put ice or a cold pack against your hip for 10 to 20 minutes at a time. Try to do this every 1 to 2 hours for the next 3 days (when you are awake) or until the swelling goes down. Put a thin cloth between the ice and your skin. · Sleep on your healthy side with a pillow between your knees, or sleep on your back with pillows under your knees. · If there is no swelling, you can put moist heat, a heating pad, or a warm cloth on your hip. Do gentle stretching exercises to help keep your hip flexible. · Learn how to prevent falls. Have your vision and hearing checked regularly. Wear slippers or shoes with a nonskid sole. · Stay at a healthy weight. · Wear comfortable shoes. When should you call for help? Call 911 anytime you think you may need emergency care. For example, call if: 
? · You have sudden chest pain and shortness of breath, or you cough up blood. ? · You are not able to stand or walk or bear weight. ? · Your buttocks, legs, or feet feel numb or tingly. ? · Your leg or foot is cool or pale or changes color. ? · You have severe pain. ?Call your doctor now or seek immediate medical care if: 
? · You have signs of infection, such as: 
¨ Increased pain, swelling, warmth, or redness in the hip area. ¨ Red streaks leading from the hip area. ¨ Pus draining from the hip area. ¨ A fever. ? · You have signs of a blood clot, such as: 
¨ Pain in your calf, back of the knee, thigh, or groin. ¨ Redness and swelling in your leg or groin. ? · You are not able to bend, straighten, or move your leg normally. ? · You have trouble urinating or having bowel movements. ? Watch closely for changes in your health, and be sure to contact your doctor if: 
? · You do not get better as expected. Where can you learn more? Go to http://torres-gabrielle.info/. Enter Y255 in the search box to learn more about \"Hip Pain: Care Instructions. \" Current as of: March 20, 2017 Content Version: 11.4 © 6216-7920 [employer]. Care instructions adapted under license by [employer] (which disclaims liability or warranty for this information). If you have questions about a medical condition or this instruction, always ask your healthcare professional. [place] [address] any warranty or liability for your use of this information. Migraine Aura Without a Headache: Care Instructions Your Care Instructions A migraine aura without a headache is a type of migraine. When you have an aura, you may see spots, wavy lines, or flashing lights.  Your hands, arms, or face may tingle or feel numb. But unlike other migraines, a headache doesn't follow the aura. Some people have both types of migraines. Although they sometimes have an aura without the headache, at other times they may get a headache after an aura. Without treatment, migraines can last from 4 hours to a few days. Medicines can help prevent migraines or stop them once they have started. Your doctor can help you find which ones work best for you. Follow-up care is a key part of your treatment and safety. Be sure to make and go to all appointments, and call your doctor if you are having problems. It's also a good idea to know your test results and keep a list of the medicines you take. How can you care for yourself at home? · Do not drive if you have taken a prescription pain medicine. · Rest in a quiet, dark room until your aura or headache is gone. Close your eyes. Try to relax or go to sleep. Don't watch TV or read. · If you get a headache, put a cold, moist cloth or cold pack on the painful area for 10 to 20 minutes at a time. Put a thin cloth between the cold pack and your skin. · Use a warm, moist towel or a heating pad set on low. This can relax tight shoulder and neck muscles. · Have someone gently massage your neck and shoulders. · Be safe with medicines. Take your medicines exactly as prescribed. Call your doctor if you think you are having a problem with your medicine. You will get more details on the specific medicines your doctor prescribes. · Be careful not to take medicine more often than the instructions allow. This may cause worse or more frequent auras or headaches when the medicine wears off. To prevent migraines · Keep a diary so you can figure out what triggers your auras or headaches. Avoiding triggers may help you prevent migraines.  Record when each aura or headache began, how long it lasted, and what the symptoms were like. Write down any other symptoms you had with the aura. These may include nausea or sensitivity to bright light or loud noise. Note if the aura or headache occurred near your period. List anything that might have triggered the aura. Triggers may include certain foods (chocolate, cheese, wine) or odors, smoke, bright light, stress, or lack of sleep. · If your doctor has prescribed medicine for your migraines, take it as directed. You may have medicine that you take only when you get a migraine and medicine that you take all the time to help prevent migraines. ¨ If your doctor has prescribed medicine for when you get migraines, take it at the first sign of an aura, unless your doctor has given you other instructions. ¨ If your doctor has prescribed medicine to prevent migraines, take it exactly as prescribed. Call your doctor if you think you are having a problem with your medicine. · Find healthy ways to deal with stress. Migraines are most common during or right after stressful times. Take time to relax before and after you do something that has caused a migraine in the past. 
· Get plenty of sleep and exercise. · Eat regular meals, and avoid foods and drinks that often trigger migraines. These include chocolate and alcohol, especially red wine and port. Chemicals used in food, such as aspartame and monosodium glutamate (MSG), also can trigger migraines. So can some food additives, such as those found in hot dogs, augustin, cold cuts, aged cheeses, and pickled foods. · Limit caffeine by not drinking too much coffee, tea, or soda. But don't quit caffeine suddenly, because that can also give you migraines. · Do not smoke or allow others to smoke around you. If you need help quitting, talk to your doctor about stop-smoking programs and medicines. These can increase your chances of quitting for good.  
· If you are taking birth control pills or hormone therapy, talk to your doctor about whether they are triggering your migraines. When should you call for help? Call 911 anytime you think you may need emergency care. For example, call if: 
? · You have symptoms of a stroke. These may include: 
¨ Sudden numbness, tingling, weakness, or loss of movement in your face, arm, or leg, especially on only one side of your body. ¨ Sudden vision changes. ¨ Sudden trouble speaking. ¨ Sudden confusion or trouble understanding simple statements. ¨ Sudden problems with walking or balance. ¨ A sudden, severe headache that is different from past headaches. ?Call your doctor now or seek immediate medical care if: 
? · You have new or worse nausea and vomiting. ? · You have a new or higher fever. ? · Your headache gets much worse. ? Watch closely for changes in your health, and be sure to contact your doctor if: 
? · You are not getting better after 2 days (48 hours). Where can you learn more? Go to http://torres-gabrielle.info/. Enter 415 [id number] in the search box to learn more about \"Migraine Aura Without a Headache: Care Instructions. \" Current as of: October 14, 2016 Content Version: 11.4 © 2329-2932 [employer] - is now [employer]. Care instructions adapted under license by [employer] (which disclaims liability or warranty for this information). If you have questions about a medical condition or this instruction, always ask your healthcare professional. [place] [address] any warranty or liability for your use of this information. Introducing [facility]! Lynda Appiah introduces [employer] patient portal. Now you can access parts of your medical record, email your doctor's office, and request medication refills online. 1. In your internet browser, go to [email or web address] 2. Click on the First Time User? Click Here link in the Sign In box. You will see the New Member Sign Up page. 3. Enter your StoryBlender Access Code exactly as it appears below. You will not need to use this code after youve completed the sign-up process. If you do not sign up before the expiration date, you must request a new code. · StoryBlender Access Code: [email or web address] Expires: 6/20/2018 10:53 AM 
 
4. Enter the last four digits of your Social Security Number (xxxx) and Date of Birth (mm/dd/yyyy) as indicated and click Submit. You will be taken to the next sign-up page. 5. Create a StoryBlender ID. This will be your StoryBlender login ID and cannot be changed, so think of one that is secure and easy to remember. 6. Create a StoryBlender password. You can change your password at any time. 7. Enter your Password Reset Question and Answer. This can be used at a later time if you forget your password. 8. Enter your e-mail address. You will receive e-mail notification when new information is available in [address] 9. Click Sign Up. You can now view and download portions of your medical record. 10. Click the Download Summary menu link to download a portable copy of your medical information. If you have questions, please visit the Frequently Asked Questions section of the StoryBlender website. Remember, StoryBlender is NOT to be used for urgent needs. For medical emergencies, dial 911. Now available from your iPhone and Android! Please provide this summary of care documentation to your next provider. Your primary care clinician is listed as Cem Hart. If you have any questions after today's visit, please call [phone].

## 2018-04-12 NOTE — PATIENT INSTRUCTIONS
Foot Pain: Care Instructions  Your Care Instructions  Foot injuries that cause pain and swelling are fairly common. Almost all sports or home repair projects can cause a misstep that ends up as foot pain. Normal wear and tear, especially as you get older, also can cause foot pain. Most minor foot injuries will heal on their own, and home treatment is usually all you need to do. If you have a severe injury, you may need tests and treatment. Follow-up care is a key part of your treatment and safety. Be sure to make and go to all appointments, and call your doctor if you are having problems. It's also a good idea to know your test results and keep a list of the medicines you take. How can you care for yourself at home? · Take pain medicines exactly as directed. ¨ If the doctor gave you a prescription medicine for pain, take it as prescribed. ¨ If you are not taking a prescription pain medicine, ask your doctor if you can take an over-the-counter medicine. · Rest and protect your foot. Take a break from any activity that may cause pain. · Put ice or a cold pack on your foot for 10 to 20 minutes at a time. Put a thin cloth between the ice and your skin. · Prop up the sore foot on a pillow when you ice it or anytime you sit or lie down during the next 3 days. Try to keep it above the level of your heart. This will help reduce swelling. · Your doctor may recommend that you wrap your foot with an elastic bandage. Keep your foot wrapped for as long as your doctor advises. · If your doctor recommends crutches, use them as directed. · Wear roomy footwear. · As soon as pain and swelling end, begin gentle exercises of your foot. Your doctor can tell you which exercises will help. When should you call for help? Call 911 anytime you think you may need emergency care. For example, call if:  ? · Your foot turns pale, white, blue, or cold.    ?Call your doctor now or seek immediate medical care if:  ? · You cannot move or stand on your foot. ? · Your foot looks twisted or out of its normal position. ? · Your foot is not stable when you step down. ? · You have signs of infection, such as:  ¨ Increased pain, swelling, warmth, or redness. ¨ Red streaks leading from the sore area. ¨ Pus draining from a place on your foot. ¨ A fever. ? · Your foot is numb or tingly. ? Watch closely for changes in your health, and be sure to contact your doctor if:  ? · You do not get better as expected. ? · You have bruises from an injury that last longer than 2 weeks. Where can you learn more? Go to http://torres-gabrielle.info/. Enter Z733 in the search box to learn more about \"Foot Pain: Care Instructions. \"  Current as of: March 21, 2017  Content Version: 11.4  © 8824-9339 [employer]. Care instructions adapted under license by [employer] (which disclaims liability or warranty for this information). If you have questions about a medical condition or this instruction, always ask your healthcare professional. [place] [address] any warranty or liability for your use of this information. Hip Pain: Care Instructions  Your Care Instructions    Hip pain may be caused by many things, including overuse, a fall, or a twisting movement. Another cause of hip pain is arthritis. Your pain may increase when you stand up, walk, or squat. The pain may come and go or may be constant. Home treatment can help relieve hip pain, swelling, and stiffness. If your pain is ongoing, you may need more tests and treatment. Follow-up care is a key part of your treatment and safety. Be sure to make and go to all appointments, and call your doctor if you are having problems. It's also a good idea to know your test results and keep a list of the medicines you take. How can you care for yourself at home? · Take pain medicines exactly as directed.   ¨ If the doctor gave you a prescription medicine for pain, take it as prescribed. ¨ If you are not taking a prescription pain medicine, ask your doctor if you can take an over-the-counter medicine. · Rest and protect your hip. Take a break from any activity, including standing or walking, that may cause pain. · Put ice or a cold pack against your hip for 10 to 20 minutes at a time. Try to do this every 1 to 2 hours for the next 3 days (when you are awake) or until the swelling goes down. Put a thin cloth between the ice and your skin. · Sleep on your healthy side with a pillow between your knees, or sleep on your back with pillows under your knees. · If there is no swelling, you can put moist heat, a heating pad, or a warm cloth on your hip. Do gentle stretching exercises to help keep your hip flexible. · Learn how to prevent falls. Have your vision and hearing checked regularly. Wear slippers or shoes with a nonskid sole. · Stay at a healthy weight. · Wear comfortable shoes. When should you call for help? Call 911 anytime you think you may need emergency care. For example, call if:  ? · You have sudden chest pain and shortness of breath, or you cough up blood. ? · You are not able to stand or walk or bear weight. ? · Your buttocks, legs, or feet feel numb or tingly. ? · Your leg or foot is cool or pale or changes color. ? · You have severe pain. ?Call your doctor now or seek immediate medical care if:  ? · You have signs of infection, such as:  ¨ Increased pain, swelling, warmth, or redness in the hip area. ¨ Red streaks leading from the hip area. ¨ Pus draining from the hip area. ¨ A fever. ? · You have signs of a blood clot, such as:  ¨ Pain in your calf, back of the knee, thigh, or groin. ¨ Redness and swelling in your leg or groin. ? · You are not able to bend, straighten, or move your leg normally. ? · You have trouble urinating or having bowel movements. ? Watch closely for changes in your health, and be sure to contact your doctor if:  ? · You do not get better as expected. Where can you learn more? Go to http://torres-gabrielle.info/. Enter U759 in the search box to learn more about \"Hip Pain: Care Instructions. \"  Current as of: March 20, 2017  Content Version: 11.4  © 9806-1321 [employer]. Care instructions adapted under license by [employer] (which disclaims liability or warranty for this information). If you have questions about a medical condition or this instruction, always ask your healthcare professional. [address] any warranty or liability for your use of this information. Migraine Aura Without a Headache: Care Instructions  Your Care Instructions    A migraine aura without a headache is a type of migraine. When you have an aura, you may see spots, wavy lines, or flashing lights. Your hands, arms, or face may tingle or feel numb. But unlike other migraines, a headache doesn't follow the aura. Some people have both types of migraines. Although they sometimes have an aura without the headache, at other times they may get a headache after an aura. Without treatment, migraines can last from 4 hours to a few days. Medicines can help prevent migraines or stop them once they have started. Your doctor can help you find which ones work best for you. Follow-up care is a key part of your treatment and safety. Be sure to make and go to all appointments, and call your doctor if you are having problems. It's also a good idea to know your test results and keep a list of the medicines you take. How can you care for yourself at home? · Do not drive if you have taken a prescription pain medicine. · Rest in a quiet, dark room until your aura or headache is gone. Close your eyes. Try to relax or go to sleep. Don't watch TV or read. · If you get a headache, put a cold, moist cloth or cold pack on the painful area for 10 to 20 minutes at a time.  Put a thin cloth between the cold pack and your skin. · Use a warm, moist towel or a heating pad set on low. This can relax tight shoulder and neck muscles. · Have someone gently massage your neck and shoulders. · Be safe with medicines. Take your medicines exactly as prescribed. Call your doctor if you think you are having a problem with your medicine. You will get more details on the specific medicines your doctor prescribes. · Be careful not to take medicine more often than the instructions allow. This may cause worse or more frequent auras or headaches when the medicine wears off. To prevent migraines  · Keep a diary so you can figure out what triggers your auras or headaches. Avoiding triggers may help you prevent migraines. Record when each aura or headache began, how long it lasted, and what the symptoms were like. Write down any other symptoms you had with the aura. These may include nausea or sensitivity to bright light or loud noise. Note if the aura or headache occurred near your period. List anything that might have triggered the aura. Triggers may include certain foods (chocolate, cheese, wine) or odors, smoke, bright light, stress, or lack of sleep. · If your doctor has prescribed medicine for your migraines, take it as directed. You may have medicine that you take only when you get a migraine and medicine that you take all the time to help prevent migraines. ¨ If your doctor has prescribed medicine for when you get migraines, take it at the first sign of an aura, unless your doctor has given you other instructions. ¨ If your doctor has prescribed medicine to prevent migraines, take it exactly as prescribed. Call your doctor if you think you are having a problem with your medicine. · Find healthy ways to deal with stress. Migraines are most common during or right after stressful times.  Take time to relax before and after you do something that has caused a migraine in the past.  · Get plenty of sleep and exercise. · Eat regular meals, and avoid foods and drinks that often trigger migraines. These include chocolate and alcohol, especially red wine and port. Chemicals used in food, such as aspartame and monosodium glutamate (MSG), also can trigger migraines. So can some food additives, such as those found in hot dogs, augustin, cold cuts, aged cheeses, and pickled foods. · Limit caffeine by not drinking too much coffee, tea, or soda. But don't quit caffeine suddenly, because that can also give you migraines. · Do not smoke or allow others to smoke around you. If you need help quitting, talk to your doctor about stop-smoking programs and medicines. These can increase your chances of quitting for good. · If you are taking birth control pills or hormone therapy, talk to your doctor about whether they are triggering your migraines. When should you call for help? Call 911 anytime you think you may need emergency care. For example, call if:  ? · You have symptoms of a stroke. These may include:  ¨ Sudden numbness, tingling, weakness, or loss of movement in your face, arm, or leg, especially on only one side of your body. ¨ Sudden vision changes. ¨ Sudden trouble speaking. ¨ Sudden confusion or trouble understanding simple statements. ¨ Sudden problems with walking or balance. ¨ A sudden, severe headache that is different from past headaches. ?Call your doctor now or seek immediate medical care if:  ? · You have new or worse nausea and vomiting. ? · You have a new or higher fever. ? · Your headache gets much worse. ? Watch closely for changes in your health, and be sure to contact your doctor if:  ? · You are not getting better after 2 days (48 hours). Where can you learn more? Go to http://torres-gabrielle.info/. Enter [phone] [id number] in the search box to learn more about \"Migraine Aura Without a Headache: Care Instructions. \"  Current as of: October 14, 2016  Content Version: 11.4  © 4364-2817 [employer]. Care instructions adapted under license by [employer] (which disclaims liability or warranty for this information). If you have questions about a medical condition or this instruction, always ask your healthcare professional. [address] any warranty or liability for your use of this information.

## 2018-04-13 NOTE — PROGRESS NOTES
I called pt, no answer. LM on VM to call back for test results.  When pt calls back notify of the above message per NP.

## 2018-04-16 ENCOUNTER — TELEPHONE (OUTPATIENT)
Dept: INTERNAL MEDICINE CLINIC | Age: 83
End: 2018-04-16

## 2018-04-16 NOTE — TELEPHONE ENCOUNTER
----- Message from Cruzito Hartley sent at 4/16/2018  4:17 PM EDT -----  Regarding: HORACIO Jeronimo/ Telephone  Patient returning a call from [facility].  Contact is [phone]

## 2018-04-17 NOTE — TELEPHONE ENCOUNTER
I called pt back, no answer. LM on  returning call. When pt calls back notify per NP pt doesn't have a fracture in her foot. Right foot big toe shows some arthritis. 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

## 2018-04-19 NOTE — TELEPHONE ENCOUNTER
Pt was informed of imaging results. Advise to call office back if sx get worse. Pt verbalized understanding.

## 2018-04-19 NOTE — TELEPHONE ENCOUNTER
----- Message from Magalys Ayon sent at 4/19/2018 11:14 AM EDT -----  Regarding: HORACIO Jeronimo/Telephone  Pt is returning a call from [facility] regarding x-rays. The  best contact is [phone]. Numerous missed calls.

## 2018-05-03 ENCOUNTER — TELEPHONE (OUTPATIENT)
Dept: INTERNAL MEDICINE CLINIC | Age: 83
End: 2018-05-03

## 2018-05-03 NOTE — TELEPHONE ENCOUNTER
Patient is requesting to have a script for the shingles vaccine sent to her [email or web address] on [address] . Patient state she believes she's having a break out on her chest.       Patient can be reached at [phone]

## 2018-05-04 NOTE — TELEPHONE ENCOUNTER
L/vm for pt if she is having shingles symptoms cannot receive vaccine and needs to be seen in office. Return call to schedule an appointment.

## 2018-05-08 ENCOUNTER — OFFICE VISIT (OUTPATIENT)
Dept: INTERNAL MEDICINE CLINIC | Age: 83
End: 2018-05-08

## 2018-05-08 VITALS
TEMPERATURE: 98.3 F | WEIGHT: 161 LBS | OXYGEN SATURATION: 98 % | RESPIRATION RATE: 14 BRPM | HEART RATE: 64 BPM | DIASTOLIC BLOOD PRESSURE: 67 MMHG | SYSTOLIC BLOOD PRESSURE: 164 MMHG | HEIGHT: 65 IN | BODY MASS INDEX: 26.82 KG/M2

## 2018-05-08 DIAGNOSIS — M25.551 RIGHT HIP PAIN: ICD-10-CM

## 2018-05-08 DIAGNOSIS — M25.559 ARTHRALGIA OF HIP, UNSPECIFIED LATERALITY: ICD-10-CM

## 2018-05-08 DIAGNOSIS — E03.9 ACQUIRED HYPOTHYROIDISM: ICD-10-CM

## 2018-05-08 DIAGNOSIS — M79.671 RIGHT FOOT PAIN: ICD-10-CM

## 2018-05-08 DIAGNOSIS — F34.1 DYSTHYMIA: ICD-10-CM

## 2018-05-08 DIAGNOSIS — I10 ESSENTIAL HYPERTENSION: Primary | ICD-10-CM

## 2018-05-08 DIAGNOSIS — I87.2 VENOUS INSUFFICIENCY: ICD-10-CM

## 2018-05-08 NOTE — PROGRESS NOTES
HISTORY OF PRESENT ILLNESS  Pineda Velez is a 80 y.o. female. HPI   Patient expresses concerns about shingles. Patient reports onset of burning and itching rash 3-4 days ago. Patient notes the rash is on left low back. Patient notes she followed up with pharmacy for shingles vaccine. Patient notes the rash is much better. Denies using new detergents or soaps. Patient notes she did buy new plants, but the rash started before she got the plants. Patient had tried to use a makeup on the rash, but it did not help. Patient reports mood stable. She continues Zoloft 50 mg. Patient reports varicose veins have been causing more pains. Imaging shows patient has mild arthritis on hip and foot. Patient notes she is exercising regularly. She states right hip pain has been getting worse. Patient states right foot pain has gotten worse. She notes she had nerve clipped in her foot, but it returned and started to hurt more. Patient reports she had fallen with plate in her hand and ripped the skin in hand. She had worn bandage and the skin healed, but she developed pain and a lump beneath the skin. Patient had finger operated on and it was not tendonitis. She notes her finger and hand are still tight, but she is able to use it. Hypertension ROS: taking medications as instructed, no medication side effects noted, no TIA's, no chest pain on exertion, no dyspnea on exertion, no swelling of ankles. New concerns:  Patient's BP in office today is 164/67.   hypothyroidism. Lab Results   Component Value Date/Time    TSH 0.926 08/30/2017 12:41 PM     Thyroid ROS: denies fatigue, weight changes, heat/cold intolerance, bowel/skin changes or CVS symptoms. Patient states [place] Thyroid has gotten expensive. She notes current medications help her to ambulate and mobilize joints. Review of Systems   All other systems reviewed and are negative. Physical Exam   Constitutional: She is oriented to person, place, and time.  She appears well-developed and well-nourished. HENT:   Head: Normocephalic and atraumatic. Right Ear: External ear normal.   Left Ear: External ear normal.   Nose: Nose normal.   Mouth/Throat: Oropharynx is clear and moist.   Eyes: Conjunctivae and EOM are normal.   Neck: Normal range of motion. Neck supple. Carotid bruit is not present. No thyroid mass and no thyromegaly present. Cardiovascular: Normal rate, regular rhythm, S1 normal, S2 normal, normal heart sounds and intact distal pulses. Pulmonary/Chest: Effort normal and breath sounds normal.   Abdominal: Soft. Normal appearance and bowel sounds are normal. There is no hepatosplenomegaly. There is no tenderness. Musculoskeletal: Normal range of motion. Neurological: She is alert and oriented to person, place, and time. She has normal strength. No cranial nerve deficit or sensory deficit. Coordination normal.   Skin: Skin is warm, dry and intact. No abrasion and no rash noted. Psychiatric: She has a normal mood and affect. Her behavior is normal. Judgment and thought content normal.   Nursing note and vitals reviewed. ASSESSMENT and PLAN  Diagnoses and all orders for this visit:    1. Essential hypertension  /67 today in office, stable. I do not recommend any change in medications.  -     LIPID PANEL  -     METABOLIC PANEL, COMPREHENSIVE    2. Arthralgia of hip, unspecified laterality  Patient has mild arthritis of hip. Patient will continue to stretch hip, suspect foot pain exacerbating hip pains. 3. Dysthymia  Mood stable and doing well on Zoloft 50 mg. Continue current meds. 4. Right foot pain  Patient will follow up with orthopedic for an evaluations.   -     REFERRAL TO ORTHOPEDIC SURGERY    5. Right hip pain  Concerned right foot pains are exacerbating right hip pains, continue to stretch hip. 6. Acquired hypothyroidism  Stable. I do not recommend a change in medications. She will contact mail order for cheaper medications. -     TSH 3RD GENERATION  -     T4, FREE    7. Venous insufficiency  Follow up vascular specialist to discuss varicose veins. lab results and schedule of future lab studies reviewed with patient  reviewed diet, exercise and weight control    Written by Cheko Tinoco, as dictated by Kassandra Frazier MD.     Current diagnosis and concerns discussed with pt at length. Understands risks and benefits or current treatment plan and medications and accepts the treatment and medication with any possible risks. Pt asks appropriate questions which were answered. Pt instructed to call with any concerns or problems.

## 2018-07-17 ENCOUNTER — HOSPITAL ENCOUNTER (EMERGENCY)
Age: 83
Discharge: HOME | End: 2018-07-17
Attending: EMERGENCY MEDICINE | Admitting: EMERGENCY MEDICINE
Payer: MEDICARE

## 2018-07-17 VITALS
DIASTOLIC BLOOD PRESSURE: 77 MMHG | HEIGHT: 65 IN | HEART RATE: 62 BPM | RESPIRATION RATE: 16 BRPM | BODY MASS INDEX: 26.49 KG/M2 | TEMPERATURE: 98.4 F | SYSTOLIC BLOOD PRESSURE: 134 MMHG | OXYGEN SATURATION: 91 % | WEIGHT: 159 LBS

## 2018-07-17 DIAGNOSIS — R04.0 EPISTAXIS: Primary | ICD-10-CM

## 2018-07-17 PROCEDURE — 99282 EMERGENCY DEPT VISIT SF MDM: CPT

## 2018-07-17 PROCEDURE — 75810000284 HC CNTRL NASAL HEMORHRAGE SIMPLE

## 2018-07-17 PROCEDURE — 74011250637 HC RX REV CODE- 250/637: Performed by: EMERGENCY MEDICINE

## 2018-07-17 RX ORDER — OXYMETAZOLINE HCL 0.05 %
2 SPRAY, NON-AEROSOL (ML) NASAL
Status: COMPLETED | OUTPATIENT
Start: 2018-07-17 | End: 2018-07-17

## 2018-07-17 RX ADMIN — OXYMETAZOLINE HYDROCHLORIDE 2 SPRAY: 5 SPRAY NASAL at 01:45

## 2018-07-17 RX ADMIN — NEOMYCIN AND POLYMYXIN B SULFATES AND BACITRACIN ZINC 1 PACKET: 400; 3.5; 5 OINTMENT TOPICAL at 02:23

## 2018-07-17 NOTE — DISCHARGE INSTRUCTIONS
We hope that we have addressed all of your medical concerns. The examination and treatment you received in the Emergency Department were for an emergent problem and were not intended as complete care. It is important that you follow up with your healthcare provider(s) for ongoing care. If your symptoms worsen or do not improve as expected, and you are unable to reach your usual health care provider(s), you should return to the Emergency Department. Today's healthcare is undergoing tremendous change, and patient satisfaction surveys are one of the many tools to assess the quality of medical care. You may receive a survey from the [employer] regarding your experience in the Emergency Department. I hope that your experience has been completely positive, particularly the medical care that I provided. As such, please participate in the survey; anything less than excellent does not meet my expectations or intentions. [facility] and [address] [place] [address] participate in nationally recognized quality of care measures. If your blood pressure is greater than 120/80, as reported below, we urge that you seek medical care to address the potential of high blood pressure, commonly known as hypertension. Hypertension can be hereditary or can be caused by certain medical conditions, pain, stress, or \"white coat syndrome. \"       Please make an appointment with your health care provider(s) for follow up of your Emergency Department visit. VITALS:   Patient Vitals for the past 8 hrs:   Temp Pulse Resp BP SpO2   07/17/18 0138 98.4 °F (36.9 °C) 62 16 (!) 204/70 96 %          Thank you for allowing us to provide you with medical care today. We realize that you have many choices for your emergency care needs. Please choose us in the future for any continued health care needs. Daniele Kraft, [facility]. Office: [phone]            No results found for this or any previous visit (from the past 24 hour(s)). No results found. Nosebleeds: Care Instructions  Your Care Instructions    Nosebleeds are common, especially if you have colds or allergies. Many things can cause a nosebleed. Some nosebleeds stop on their own with pressure. Others need packing. Some get cauterized (sealed). If you have gauze or other packing materials in your nose, you will need to follow up with your doctor to have the packing removed. You may need more treatment if you get nosebleeds a lot. The doctor has checked you carefully, but problems can develop later. If you notice any problems or new symptoms, get medical treatment right away. Follow-up care is a key part of your treatment and safety. Be sure to make and go to all appointments, and call your doctor if you are having problems. It's also a good idea to know your test results and keep a list of the medicines you take. How can you care for yourself at home? · If you get another nosebleed:  ¨ Sit up and tilt your head slightly forward. This keeps blood from going down your throat. ¨ Use your thumb and index finger to pinch your nose shut for 10 minutes. Use a clock. Do not check to see if the bleeding has stopped before the 10 minutes are up. If the bleeding has not stopped, pinch your nose shut for another 10 minutes. ¨ When the bleeding has stopped, try not to pick, rub, or blow your nose for 12 hours. Avoiding these things helps keep your nose from bleeding again. · If your doctor prescribed antibiotics, take them as directed. Do not stop taking them just because you feel better. You need to take the full course of antibiotics. To prevent nosebleeds  · Do not blow your nose too hard. · Try not to lift or strain after a nosebleed. · Raise your head on a pillow while you sleep.   · Put a thin layer of a saline- or water-based nasal gel, such as NasoGel, inside your nose. Put it on the septum, which divides your nostrils. This will prevent dryness that can cause nosebleeds. · Use a vaporizer or humidifier to add moisture to your bedroom. Follow the directions for cleaning the machine. · Do not use aspirin, ibuprofen (Advil, Motrin), or naproxen (Aleve) for 36 to 48 hours after a nosebleed unless your doctor tells you to. You can use acetaminophen (Tylenol) for pain relief. · Talk to your doctor about stopping any other medicines you are taking. Some medicines may make you more likely to get a nosebleed. · Do not use cold medicines or nasal sprays without first talking to your doctor. They can make your nose dry. When should you call for help? Call 911 anytime you think you may need emergency care. For example, call if:    · You passed out (lost consciousness).    Call your doctor now or seek immediate medical care if:    · You get another nosebleed and your nose is still bleeding after you have applied pressure 3 times for 10 minutes each time (30 minutes total).     · There is a lot of blood running down the back of your throat even after you pinch your nose and tilt your head forward.     · You have a fever.     · You have sinus pain.    Watch closely for changes in your health, and be sure to contact your doctor if:    · You get nosebleeds often, even if they stop.     · You do not get better as expected. Where can you learn more? Go to http://torres-gabrielle.info/. Enter S156 in the search box to learn more about \"Nosebleeds: Care Instructions. \"  Current as of: November 20, 2017  Content Version: 11.7  © 0474-2531 [employer]. Care instructions adapted under license by "[employer] (which disclaims liability or warranty for this information).  If you have questions about a medical condition or this instruction, always ask your healthcare professional. [address]  any warranty or liability for your use of this information.

## 2018-07-17 NOTE — ED PROVIDER NOTES
HPI Comments: 80 y.o. female with past medical history significant for hypercholesterolemia, depression, thyroid disease, who presents via EMS with chief complaint of nosebleed that started ~1 hour PTA. Patient states she woke up at 0030 this morning due to blood coming from her right nostril. She states the blood came \"pouring out\" of her nose and covered her clothing and bed. Notes there was also blood running down the back of her throat \"with clots\". Pt called a neighbor who in turn called EMS to bring patient to the ED. She reports that since she has arrived to the ED, the bleeding has slowed down. Patient denies current anticoagulation therapy, but she admits that she has been taking between 2-4 tablets of Aspirin per day to treat recent dental pain. Patient denies fever, nausea, or vomiting. There are no other acute medical concerns at this time. Social hx: Negative Tobacco Usage; Negative Alcohol Usage; Negative Drug Usage    PCP: Bertram Brown MD    Note written by Gómez Liang, as dictated by Juan Miguel Mccarthy DO 1:40 AM       The history is provided by the patient. No  was used.         Past Medical History:   Diagnosis Date    Depression     Hypercholesterolemia     Thyroid disease     hypothyroid       Past Surgical History:   Procedure Laterality Date    HX CATARACT REMOVAL Bilateral     HX DILATION AND CURETTAGE      more than once-can't remember    HX ORTHOPAEDIC Bilateral     sena's neuroma    HX ORTHOPAEDIC Right     Right finger     HX ROTATOR CUFF REPAIR Right     HX TONSILLECTOMY      HX UROLOGICAL      \"bladder lift\"         Family History:   Problem Relation Age of Onset    Stroke Mother     Cancer Father      Brain & Lung    Diabetes Sister     Stroke Sister     Diabetes Brother     Diabetes Maternal Grandmother     Cancer Sister     Heart Disease Brother        Social History     Social History    Marital status:  Spouse name: N/A    Number of children: N/A    Years of education: N/A     Occupational History    Not on file. Social History Main Topics    Smoking status: Never Smoker    Smokeless tobacco: Never Used    Alcohol use No    Drug use: No    Sexual activity: No     Other Topics Concern    Not on file     Social History Narrative     ALLERGIES: Demerol [meperidine] and Pcn [penicillins]    Review of Systems   Constitutional: Negative for appetite change, chills, fever and unexpected weight change. HENT: Positive for nosebleeds. Negative for ear pain, hearing loss, rhinorrhea and trouble swallowing. Eyes: Negative for pain and visual disturbance. Respiratory: Negative for cough, chest tightness and shortness of breath. Cardiovascular: Negative for chest pain and palpitations. Gastrointestinal: Negative for abdominal distention, abdominal pain, blood in stool, nausea and vomiting. Genitourinary: Negative for dysuria, hematuria and urgency. Musculoskeletal: Negative for back pain and myalgias. Skin: Negative for rash. Neurological: Negative for dizziness, syncope, weakness and numbness. Psychiatric/Behavioral: Negative for confusion and suicidal ideas. All other systems reviewed and are negative. Vitals:    07/17/18 0138 07/17/18 0145 07/17/18 0215   BP: (!) 204/70 172/56 134/77   Pulse: 62     Resp: 16     Temp: 98.4 °F (36.9 °C)     SpO2: 96% (!) 88% 91%   Weight: 72.1 kg (159 lb)     Height: 5' 5\" (1.651 m)              Physical Exam   Constitutional: She is oriented to person, place, and time. She appears well-developed and well-nourished. No distress. HENT:   Head: Normocephalic and atraumatic. Right Ear: External ear normal.   Left Ear: External ear normal.   Nose: Epistaxis is observed. Mouth/Throat: Oropharynx is clear and moist. No oropharyngeal exudate. Area of light bleeding on anterior septal area of right nostril.    Eyes: Conjunctivae and EOM are normal. Pupils are equal, round, and reactive to light. Right eye exhibits no discharge. Left eye exhibits no discharge. No scleral icterus. Neck: Normal range of motion. Neck supple. No JVD present. No tracheal deviation present. Cardiovascular: Normal rate, regular rhythm, normal heart sounds and intact distal pulses. Exam reveals no gallop and no friction rub. No murmur heard. Pulmonary/Chest: Effort normal and breath sounds normal. No stridor. No respiratory distress. She has no decreased breath sounds. She has no wheezes. She has no rhonchi. She has no rales. She exhibits no tenderness. Abdominal: Soft. Bowel sounds are normal. She exhibits no distension. There is no tenderness. There is no rebound and no guarding. Musculoskeletal: Normal range of motion. She exhibits no edema or tenderness. Neurological: She is alert and oriented to person, place, and time. She has normal strength and normal reflexes. No cranial nerve deficit or sensory deficit. She exhibits normal muscle tone. Coordination normal. GCS eye subscore is 4. GCS verbal subscore is 5. GCS motor subscore is 6. Skin: Skin is warm and dry. No rash noted. She is not diaphoretic. No erythema. No pallor. Psychiatric: She has a normal mood and affect. Her behavior is normal. Judgment and thought content normal.   Nursing note and vitals reviewed. Note written by Anastasiia Anna. [place], as dictated by Martir Mckinnon, DO 1:40 AM      MDM  Number of Diagnoses or Management Options  Epistaxis:   Risk of Complications, Morbidity, and/or Mortality  Presenting problems: moderate  Diagnostic procedures: low  Management options: moderate    Patient Progress  Patient progress: improved        ED Course       Procedures    Chief Complaint   Patient presents with    Epistaxis       The patient's presenting problems have been discussed, and they are in agreement with the care plan formulated and outlined with them.  I have encouraged them to ask questions as they arise throughout their visit. MEDICATIONS GIVEN:  Medications   oxymetazoline (AFRIN) 0.05 % nasal spray 2 Spray (2 Sprays Right Nostril Given by Provider 7/17/18 0145)   neomycin-bacitracnZn-polymyxnB (NEOSPORIN) ointment 1 Packet (1 Packet Topical Given by Provider 7/17/18 0223)       VITAL SIGNS:  Patient Vitals for the past 12 hrs:   Temp Pulse Resp BP SpO2   07/17/18 0215 - - - 134/77 91 %   07/17/18 0145 - - - 172/56 (!) 88 %   07/17/18 0138 98.4 °F (36.9 °C) 62 16 (!) 204/70 96 %       PROCEDURES:  Epistaxis Management: Afrin placed on unwound cotton ball, then placed in right nostril. Bleeding stopped. Bacitracin placed in right nostril along septum. Pt tolerated well. Approximately 20 minutes spent dealing with epistaxis. PROGRESS NOTES:  2:33 AM  Discussed results and plan with patient. Patient will be discharged home with PCP and ENT follow up. Patient instructed to return to the emergency room for any worsening symptoms or any other concerns. DIAGNOSIS:    1. Epistaxis        PLAN:  Follow-up Information     Follow up With Details Comments [id number] Joe Recio MD Schedule an appointment as soon as possible for a visit  [address] [facility]  3 [facility]      Haven Woods MD In 1 week As needed [facility] [phone]  Internal Med [facility] [address]  [phone]      [facility] EMERGENCY DEPT  If symptoms worsen [address] [place] [address]  [phone]        Discharge Medication List as of 7/17/2018  2:33 AM      CONTINUE these medications which have NOT CHANGED    Details   cholecalciferol (VITAMIN D3) 1,000 unit cap Take  by mouth daily. , Historical Med      KRILL OIL PO Take 1 Cap by mouth daily. , Historical Med      cyanocobalamin, vitamin B-12, 2,500 mcg tab Take  by mouth., Historical Med      co-enzyme Q-10 (CO Q-10) 100 mg capsule Take 100 mg by mouth daily. , Historical Med      melatonin 3 mg tablet Take 3 mg by mouth nightly., Historical Med      sertraline (ZOLOFT) 50 mg tablet TAKE 1 TABLET BY MOUTH EVERY DAY, Print, Disp-90 Tab, R-3      losartan (COZAAR) 50 mg tablet Take 1 Tab by mouth two (2) times a day., Print, Disp-180 Tab, R-3      thyroid, Pork, (ARMOUR THYROID) 90 mg tablet Take 1 Tab by mouth daily. , Print, Disp-90 Tab, R-3             ED COURSE: The patient's hospital course has been uncomplicated.

## 2018-07-17 NOTE — ED NOTES
Pt discharged by provider. All needs addressed. VS stable pt ambulatory with no issues. Assessment deferred to MD notes.

## 2018-07-17 NOTE — ED TRIAGE NOTES
Pt arrives via EMS for c/o woke up with nosebleed x15 min. Bleeding would stop, then start again if she moved/touched her face.   Bleeding controlled on arrival.

## 2018-08-03 ENCOUNTER — OFFICE VISIT (OUTPATIENT)
Dept: INTERNAL MEDICINE CLINIC | Age: 83
End: 2018-08-03

## 2018-08-03 ENCOUNTER — HOSPITAL ENCOUNTER (OUTPATIENT)
Dept: GENERAL RADIOLOGY | Age: 83
Discharge: HOME | End: 2018-08-03
Attending: INTERNAL MEDICINE
Payer: MEDICARE

## 2018-08-03 VITALS
RESPIRATION RATE: 16 BRPM | BODY MASS INDEX: 26.66 KG/M2 | SYSTOLIC BLOOD PRESSURE: 150 MMHG | OXYGEN SATURATION: 93 % | DIASTOLIC BLOOD PRESSURE: 80 MMHG | TEMPERATURE: 98.6 F | WEIGHT: 160 LBS | HEIGHT: 65 IN | HEART RATE: 62 BPM

## 2018-08-03 DIAGNOSIS — M54.41 ACUTE BILATERAL LOW BACK PAIN WITH RIGHT-SIDED SCIATICA: ICD-10-CM

## 2018-08-03 DIAGNOSIS — G62.9 NEUROPATHY: ICD-10-CM

## 2018-08-03 DIAGNOSIS — M54.41 ACUTE BILATERAL LOW BACK PAIN WITH RIGHT-SIDED SCIATICA: Primary | ICD-10-CM

## 2018-08-03 PROCEDURE — 72100 X-RAY EXAM L-S SPINE 2/3 VWS: CPT

## 2018-08-03 RX ORDER — BISMUTH SUBSALICYLATE 262 MG
1 TABLET,CHEWABLE ORAL DAILY
COMMUNITY
End: 2018-10-11 | Stop reason: ALTCHOICE

## 2018-08-03 RX ORDER — METHYLPREDNISOLONE 4 MG/1
TABLET ORAL
Qty: 1 DOSE PACK | Refills: 0 | Status: SHIPPED | OUTPATIENT
Start: 2018-08-03 | End: 2018-10-11

## 2018-08-03 NOTE — PATIENT INSTRUCTIONS
Low Back Pain: Exercises  Your Care Instructions  Here are some examples of typical rehabilitation exercises for your condition. Start each exercise slowly. Ease off the exercise if you start to have pain. Your doctor or physical therapist will tell you when you can start these exercises and which ones will work best for you. How to do the exercises  Press-up    1. Lie on your stomach, supporting your body with your forearms. 2. Press your elbows down into the floor to raise your upper back. As you do this, relax your stomach muscles and allow your back to arch without using your back muscles. As your press up, do not let your hips or pelvis come off the floor. 3. Hold for 15 to 30 seconds, then relax. 4. Repeat 2 to 4 times. Alternate arm and leg (bird dog) exercise    Do this exercise slowly. Try to keep your body straight at all times, and do not let one hip drop lower than the other. 1. Start on the floor, on your hands and knees. 2. Tighten your belly muscles. 3. Raise one leg off the floor, and hold it straight out behind you. Be careful not to let your hip drop down, because that will twist your trunk. 4. Hold for about 6 seconds, then lower your leg and switch to the other leg. 5. Repeat 8 to 12 times on each leg. 6. Over time, work up to holding for 10 to 30 seconds each time. 7. If you feel stable and secure with your leg raised, try raising the opposite arm straight out in front of you at the same time. Knee-to-chest exercise    1. Lie on your back with your knees bent and your feet flat on the floor. 2. Bring one knee to your chest, keeping the other foot flat on the floor (or keeping the other leg straight, whichever feels better on your lower back). 3. Keep your lower back pressed to the floor. Hold for at least 15 to 30 seconds. 4. Relax, and lower the knee to the starting position. 5. Repeat with the other leg. Repeat 2 to 4 times with each leg.   6. To get more stretch, put your other leg flat on the floor while pulling your knee to your chest.  Curl-ups    1. Lie on the floor on your back with your knees bent at a 90-degree angle. Your feet should be flat on the floor, about 12 inches from your buttocks. 2. Cross your arms over your chest. If this bothers your neck, try putting your hands behind your neck (not your head), with your elbows spread apart. 3. Slowly tighten your belly muscles and raise your shoulder blades off the floor. 4. Keep your head in line with your body, and do not press your chin to your chest.  5. Hold this position for 1 or 2 seconds, then slowly lower yourself back down to the floor. 6. Repeat 8 to 12 times. Pelvic tilt exercise    1. Lie on your back with your knees bent. 2. \"Brace\" your stomach. This means to tighten your muscles by pulling in and imagining your belly button moving toward your spine. You should feel like your back is pressing to the floor and your hips and pelvis are rocking back. 3. Hold for about 6 seconds while you breathe smoothly. 4. Repeat 8 to 12 times. Heel dig bridging    1. Lie on your back with both knees bent and your ankles bent so that only your heels are digging into the floor. Your knees should be bent about 90 degrees. 2. Then push your heels into the floor, squeeze your buttocks, and lift your hips off the floor until your shoulders, hips, and knees are all in a straight line. 3. Hold for about 6 seconds as you continue to breathe normally, and then slowly lower your hips back down to the floor and rest for up to 10 seconds. 4. Do 8 to 12 repetitions. Hamstring stretch in doorway    1. Lie on your back in a doorway, with one leg through the open door. 2. Slide your leg up the wall to straighten your knee. You should feel a gentle stretch down the back of your leg. 3. Hold the stretch for at least 15 to 30 seconds. Do not arch your back, point your toes, or bend either knee.  Keep one heel touching the floor and the other heel touching the wall. 4. Repeat with your other leg. 5. Do 2 to 4 times for each leg. Hip flexor stretch    1. Kneel on the floor with one knee bent and one leg behind you. Place your forward knee over your foot. Keep your other knee touching the floor. 2. Slowly push your hips forward until you feel a stretch in the upper thigh of your rear leg. 3. Hold the stretch for at least 15 to 30 seconds. Repeat with your other leg. 4. Do 2 to 4 times on each side. Wall sit    1. Stand with your back 10 to 12 inches away from a wall. 2. Lean into the wall until your back is flat against it. 3. Slowly slide down until your knees are slightly bent, pressing your lower back into the wall. 4. Hold for about 6 seconds, then slide back up the wall. 5. Repeat 8 to 12 times. Follow-up care is a key part of your treatment and safety. Be sure to make and go to all appointments, and call your doctor if you are having problems. It's also a good idea to know your test results and keep a list of the medicines you take. Where can you learn more? Go to http://torres-gabrielle.info/. Enter L170 in the search box to learn more about \"Low Back Pain: Exercises. \"  Current as of: November 29, 2017  Content Version: 11.7  © 3485-5416 [employer]. Care instructions adapted under license by [employer] (which disclaims liability or warranty for this information). If you have questions about a medical condition or this instruction, always ask your healthcare professional. [place] [address] any warranty or liability for your use of this information. Acute Low Back Pain: Exercises  Your Care Instructions  Here are some examples of typical rehabilitation exercises for your condition. Start each exercise slowly. Ease off the exercise if you start to have pain.   Your doctor or physical therapist will tell you when you can start these exercises and which ones will work best for you. When you are not being active, find a comfortable position for rest. Some people are comfortable on the floor or a medium-firm bed with a small pillow under their head and another under their knees. Some people prefer to lie on their side with a pillow between their knees. Don't stay in one position for too long. Take short walks (10 to 20 minutes) every 2 to 3 hours. Avoid slopes, hills, and stairs until you feel better. Walk only distances you can manage without pain, especially leg pain. How to do the exercises  Back stretches    5. Get down on your hands and knees on the floor. 6. Relax your head and allow it to droop. Round your back up toward the ceiling until you feel a nice stretch in your upper, middle, and lower back. Hold this stretch for as long as it feels comfortable, or about 15 to 30 seconds. 7. Return to the starting position with a flat back while you are on your hands and knees. 8. Let your back sway by pressing your stomach toward the floor. Lift your buttocks toward the ceiling. 9. Hold this position for 15 to 30 seconds. 10. Repeat 2 to 4 times. Follow-up care is a key part of your treatment and safety. Be sure to make and go to all appointments, and call your doctor if you are having problems. It's also a good idea to know your test results and keep a list of the medicines you take. Where can you learn more? Go to http://torres-gabrielle.info/. Enter C280 in the search box to learn more about \"Acute Low Back Pain: Exercises. \"  Current as of: November 29, 2017  Content Version: 11.7  © 6791-8265 [employer]. Care instructions adapted under license by [employer] (which disclaims liability or warranty for this information).  If you have questions about a medical condition or this instruction, always ask your healthcare professional. [address] any warranty or liability for your use of this information.

## 2018-08-03 NOTE — PROGRESS NOTES
Ata Meyer is a 80 y.o. female who presents today for LOW BACK PAIN  . She has a history of   Patient Active Problem List   Diagnosis Code    Essential hypertension I10    Dysthymia F34.1    Dyslipidemia E78.5    Acquired hypothyroidism E03.9   . Today patient is here for back pain. Low Back Pain:  Ata Meyer is a 80 y.o. female who complains of low back pain bilaterally for 2 month(s), is positional with bending or lifting, with radiation down the right legs. Severity of pain is 5 out of 10. This has been a bit worse over the last couple weeks. Notes some bilateral neuropathy to both feet. Precipitating factors: recent heavy lifting. Prior history of back problems: recurrent self limited episodes of low back pain in the past.  Self treatment:  Takes ASA when it hurts. The patient denies fevers, chills or sweats. The patient denies bowel/bladder incontinence and saddle numbness. ROS  Review of Systems   Constitutional: Negative for chills, fever and weight loss. Eyes: Negative for blurred vision, double vision and photophobia. Respiratory: Negative for cough and shortness of breath. Cardiovascular: Negative for chest pain, palpitations, orthopnea and leg swelling. Gastrointestinal: Negative for abdominal pain, nausea and vomiting. Musculoskeletal: Positive for back pain and joint pain. Negative for myalgias and neck pain. Neurological: Negative. Notes mild neuropathy in sock distribution   Endo/Heme/Allergies: Does not bruise/bleed easily. Psychiatric/Behavioral: Negative for depression. The patient is not nervous/anxious. Visit Vitals    /69 (BP 1 Location: Left arm, BP Patient Position: Sitting)    Pulse 62    Temp 98.6 °F (37 °C) (Oral)    Resp 16    Ht 5' 5\" (1.651 m)    Wt 160 lb (72.6 kg)    SpO2 93%    BMI 26.63 kg/m2       Physical Exam   Constitutional: She is oriented to person, place, and time.  She appears well-developed and well-nourished. HENT:   Head: Normocephalic and atraumatic. Cardiovascular: Normal rate and regular rhythm. No murmur heard. Pulmonary/Chest: Effort normal. No respiratory distress. She has no wheezes. Abdominal: Soft. Bowel sounds are normal. She exhibits no distension. Musculoskeletal:        Back:    Pain where noted. + R straight leg raise. No spinal point tenderness or step off deformity. Neurological: She is alert and oriented to person, place, and time. Normal LE strength and DTR. Skin normal.    Skin: Skin is warm and dry. Psychiatric: She has a normal mood and affect. Her behavior is normal.         Current Outpatient Prescriptions   Medication Sig    multivitamin with minerals (HAIR,SKIN AND NAILS PO) Take  by mouth.  multivitamin (ONE A DAY) tablet Take 1 Tab by mouth daily.  cholecalciferol (VITAMIN D3) 1,000 unit cap Take  by mouth daily.  KRILL OIL PO Take 1 Cap by mouth daily.  cyanocobalamin, vitamin B-12, 2,500 mcg tab Take  by mouth.  co-enzyme Q-10 (CO Q-10) 100 mg capsule Take 100 mg by mouth daily.  melatonin 3 mg tablet Take 3 mg by mouth nightly.  sertraline (ZOLOFT) 50 mg tablet TAKE 1 TABLET BY MOUTH EVERY DAY    losartan (COZAAR) 50 mg tablet Take 1 Tab by mouth two (2) times a day.  thyroid, Pork, (ARMOUR THYROID) 90 mg tablet Take 1 Tab by mouth daily. No current facility-administered medications for this visit.          Past Medical History:   Diagnosis Date    Depression     Hypercholesterolemia     Thyroid disease     hypothyroid      Past Surgical History:   Procedure Laterality Date    HX CATARACT REMOVAL Bilateral     HX DILATION AND CURETTAGE      more than once-can't remember    HX ORTHOPAEDIC Bilateral     sena's neuroma    HX ORTHOPAEDIC Right     Right finger     HX ROTATOR CUFF REPAIR Right     HX TONSILLECTOMY      HX UROLOGICAL      \"bladder lift\"      Social History   Substance Use Topics    Smoking status: Never Smoker    Smokeless tobacco: Never Used    Alcohol use No      Family History   Problem Relation Age of Onset    Stroke Mother     Cancer Father      Brain & Lung    Diabetes Sister     Stroke Sister     Diabetes Brother     Diabetes Maternal Grandmother     Cancer Sister     Heart Disease Brother         Allergies   Allergen Reactions    Demerol [Meperidine] Other (comments)     Doesn't remember but states reaction was when she had first child    Pcn [Penicillins] Hives        Assessment/Plan  Diagnoses and all orders for this visit:    1. Acute bilateral low back pain with right-sided sciatica - given radicular symptoms and new mild neuropathy, will get imaging. Provided with stretches and exercises and take a medrol dose pack for inflamation. -     XR SPINE LUMB 2 OR 3 V; Future  -     methylPREDNISolone (MEDROL DOSEPACK) 4 mg tablet; As prescribed. 2. Neuropathy - mild. May need B12 drawn in the future.          Follow-up Disposition: Not on File    Valentin Caba MD  8/3/2018

## 2018-08-03 NOTE — MR AVS SNAPSHOT
[facility] 
 
 
 [address] [place] [address] 
[phone] Patient: Maico Acosta MRN: [id number] FIF:[birth year]17 Visit Information Date & Time Provider Department Dept. Phone Encounter #  
 8/3/2018 11:15 AM Hannah Ratliff MD [facility] [phone] [id number] Upcoming Health Maintenance Date Due ZOSTER VACCINE AGE 60> 7/24/1993 GLAUCOMA SCREENING Q2Y 9/24/1998 Bone Densitometry (Dexa) Screening 9/24/1998 Pneumococcal 65+ Low/Medium Risk (1 of 2 - PCV13) 9/24/1998 Influenza Age 5 to Adult 8/1/2018 MEDICARE YEARLY EXAM 2/1/2019 DTaP/Tdap/Td series (2 - Td) 8/30/2027 Allergies as of 8/3/2018  Review Complete On: 0/6/5150 By: Mirela Johnson Severity Noted Reaction Type Reactions Demerol [Meperidine]  01/31/2018    Other (comments) Doesn't remember but states reaction was when she had first child Pcn [Penicillins]  08/30/2017    Hives Current Immunizations  Never Reviewed Name Date Tdap 8/30/2017 Not reviewed this visit You Were Diagnosed With   
  
 Codes Comments Acute bilateral low back pain with right-sided sciatica    -  Primary ICD-10-CM: M54.41 
ICD-9-CM: 724.2, 724.3, 338.19 Neuropathy     ICD-10-CM: G62.9 ICD-9-CM: 877. 9 Vitals BP Pulse Temp Resp Height(growth percentile) Weight(growth percentile) 150/80 62 98.6 °F (37 °C) (Oral) 16 5' 5\" (1.651 m) 160 lb (72.6 kg) SpO2 BMI OB Status Smoking Status 93% 26.63 kg/m2 Postmenopausal Never Smoker Vitals History BMI and BSA Data Body Mass Index Body Surface Area  
 26.63 kg/m 2 1.82 m 2 Preferred Pharmacy Pharmacy Name Phone [facility] - [address] [phone] Your Updated Medication List  
  
   
This list is accurate as of 8/3/18 11:38 AM.  Always use your most recent med list.  
  
  
  
 CO Q-10 100 mg capsule Generic drug:  co-enzyme Q-10 Take 100 mg by mouth daily. cyanocobalamin (vitamin B-12) 2,500 mcg Tab Take  by mouth. HAIR,SKIN AND NAILS PO Take  by mouth. KRILL OIL PO Take 1 Cap by mouth daily. losartan 50 mg tablet Commonly known as:  COZAAR Take 1 Tab by mouth two (2) times a day. melatonin 3 mg tablet Take 3 mg by mouth nightly. methylPREDNISolone 4 mg tablet Commonly known as:  Danella Maury As prescribed. multivitamin tablet Commonly known as:  ONE A DAY Take 1 Tab by mouth daily. sertraline 50 mg tablet Commonly known as:  ZOLOFT  
TAKE 1 TABLET BY MOUTH EVERY DAY  
  
 thyroid (Pork) 90 mg tablet Commonly known as:  ARMOUR THYROID Take 1 Tab by mouth daily. VITAMIN D3 1,000 unit Cap Generic drug:  cholecalciferol Take  by mouth daily. Prescriptions Sent to Pharmacy Refills  
 methylPREDNISolone (MEDROL DOSEPACK) 4 mg tablet 0 Sig: As prescribed. Class: Normal  
 Pharmacy: [address] #: [phone] To-Do List   
 08/03/2018 Imaging:  XR SPINE LUMB 2 OR 3 V Patient Instructions Low Back Pain: Exercises Your Care Instructions Here are some examples of typical rehabilitation exercises for your condition. Start each exercise slowly. Ease off the exercise if you start to have pain. Your doctor or physical therapist will tell you when you can start these exercises and which ones will work best for you. How to do the exercises Press-up 1. Lie on your stomach, supporting your body with your forearms. 2. Press your elbows down into the floor to raise your upper back. As you do this, relax your stomach muscles and allow your back to arch without using your back muscles. As your press up, do not let your hips or pelvis come off the floor. 3. Hold for 15 to 30 seconds, then relax. 4. Repeat 2 to 4 times. Alternate arm and leg (bird dog) exercise Do this exercise slowly. Try to keep your body straight at all times, and do not let one hip drop lower than the other. 1. Start on the floor, on your hands and knees. 2. Tighten your belly muscles. 3. Raise one leg off the floor, and hold it straight out behind you. Be careful not to let your hip drop down, because that will twist your trunk. 4. Hold for about 6 seconds, then lower your leg and switch to the other leg. 5. Repeat 8 to 12 times on each leg. 6. Over time, work up to holding for 10 to 30 seconds each time. 7. If you feel stable and secure with your leg raised, try raising the opposite arm straight out in front of you at the same time. Knee-to-chest exercise 1. Lie on your back with your knees bent and your feet flat on the floor. 2. Bring one knee to your chest, keeping the other foot flat on the floor (or keeping the other leg straight, whichever feels better on your lower back). 3. Keep your lower back pressed to the floor. Hold for at least 15 to 30 seconds. 4. Relax, and lower the knee to the starting position. 5. Repeat with the other leg. Repeat 2 to 4 times with each leg. 6. To get more stretch, put your other leg flat on the floor while pulling your knee to your chest. 
Curl-ups 1. Lie on the floor on your back with your knees bent at a 90-degree angle. Your feet should be flat on the floor, about 12 inches from your buttocks. 2. Cross your arms over your chest. If this bothers your neck, try putting your hands behind your neck (not your head), with your elbows spread apart. 3. Slowly tighten your belly muscles and raise your shoulder blades off the floor. 4. Keep your head in line with your body, and do not press your chin to your chest. 
5. Hold this position for 1 or 2 seconds, then slowly lower yourself back down to the floor. 6. Repeat 8 to 12 times. Pelvic tilt exercise 1. Lie on your back with your knees bent. 2. \"Brace\" your stomach. This means to tighten your muscles by pulling in and imagining your belly button moving toward your spine. You should feel like your back is pressing to the floor and your hips and pelvis are rocking back. 3. Hold for about 6 seconds while you breathe smoothly. 4. Repeat 8 to 12 times. Heel dig bridging 1. Lie on your back with both knees bent and your ankles bent so that only your heels are digging into the floor. Your knees should be bent about 90 degrees. 2. Then push your heels into the floor, squeeze your buttocks, and lift your hips off the floor until your shoulders, hips, and knees are all in a straight line. 3. Hold for about 6 seconds as you continue to breathe normally, and then slowly lower your hips back down to the floor and rest for up to 10 seconds. 4. Do 8 to 12 repetitions. Hamstring stretch in doorway 1. Lie on your back in a doorway, with one leg through the open door. 2. Slide your leg up the wall to straighten your knee. You should feel a gentle stretch down the back of your leg. 3. Hold the stretch for at least 15 to 30 seconds. Do not arch your back, point your toes, or bend either knee. Keep one heel touching the floor and the other heel touching the wall. 4. Repeat with your other leg. 5. Do 2 to 4 times for each leg. Hip flexor stretch 1. Kneel on the floor with one knee bent and one leg behind you. Place your forward knee over your foot. Keep your other knee touching the floor. 2. Slowly push your hips forward until you feel a stretch in the upper thigh of your rear leg. 3. Hold the stretch for at least 15 to 30 seconds. Repeat with your other leg. 4. Do 2 to 4 times on each side. Wall sit 1. Stand with your back 10 to 12 inches away from a wall. 2. Lean into the wall until your back is flat against it. 3. Slowly slide down until your knees are slightly bent, pressing your lower back into the wall. 4. Hold for about 6 seconds, then slide back up the wall. 5. Repeat 8 to 12 times. Follow-up care is a key part of your treatment and safety. Be sure to make and go to all appointments, and call your doctor if you are having problems. It's also a good idea to know your test results and keep a list of the medicines you take. Where can you learn more? Go to http://torres-gabrielle.info/. Enter T434 in the search box to learn more about \"Low Back Pain: Exercises. \" Current as of: November 29, 2017 Content Version: 11.7 © 7368-7450 [employer]. Care instructions adapted under license by [employer] (which disclaims liability or warranty for this information). If you have questions about a medical condition or this instruction, always ask your healthcare professional. [address] any warranty or liability for your use of this information. Acute Low Back Pain: Exercises Your Care Instructions Here are some examples of typical rehabilitation exercises for your condition. Start each exercise slowly. Ease off the exercise if you start to have pain. Your doctor or physical therapist will tell you when you can start these exercises and which ones will work best for you. When you are not being active, find a comfortable position for rest. Some people are comfortable on the floor or a medium-firm bed with a small pillow under their head and another under their knees. Some people prefer to lie on their side with a pillow between their knees. Don't stay in one position for too long. Take short walks (10 to 20 minutes) every 2 to 3 hours. Avoid slopes, hills, and stairs until you feel better. Walk only distances you can manage without pain, especially leg pain. How to do the exercises Back stretches 5. Get down on your hands and knees on the floor. 6. Relax your head and allow it to droop. Round your back up toward the ceiling until you feel a nice stretch in your upper, middle, and lower back. Hold this stretch for as long as it feels comfortable, or about 15 to 30 seconds. 7. Return to the starting position with a flat back while you are on your hands and knees. 8. Let your back sway by pressing your stomach toward the floor. Lift your buttocks toward the ceiling. 9. Hold this position for 15 to 30 seconds. 10. Repeat 2 to 4 times. Follow-up care is a key part of your treatment and safety. Be sure to make and go to all appointments, and call your doctor if you are having problems. It's also a good idea to know your test results and keep a list of the medicines you take. Where can you learn more? Go to http://torres-gabrielle.info/. Enter M766 in the search box to learn more about \"Acute Low Back Pain: Exercises. \" Current as of: November 29, 2017 Content Version: 11.7 © 1997-0887 [employer]. Care instructions adapted under license by [employer] (which disclaims liability or warranty for this information). If you have questions about a medical condition or this instruction, always ask your healthcare professional. [address] any warranty or liability for your use of this information. Introducing [facility]! Marilou Enciso introduces MetaJure patient portal. Now you can access parts of your medical record, email your doctor's office, and request medication refills online. 1. In your internet browser, go to [email or web address] 2. Click on the First Time User? Click Here link in the Sign In box. You will see the New Member Sign Up page. 3. Enter your MetaJure Access Code exactly as it appears below. You will not need to use this code after youve completed the sign-up process.  If you do not sign up before the expiration date, you must request a new code. 
 
· AskBot Access Code: [email or web address] Expires: 10/15/2018  2:33 AM 
 
4. Enter the last four digits of your Social Security Number (xxxx) and Date of Birth (mm/dd/yyyy) as indicated and click Submit. You will be taken to the next sign-up page. 5. Create a AskBot ID. This will be your AskBot login ID and cannot be changed, so think of one that is secure and easy to remember. 6. Create a AskBot password. You can change your password at any time. 7. Enter your Password Reset Question and Answer. This can be used at a later time if you forget your password. 8. Enter your e-mail address. You will receive e-mail notification when new information is available in [address] 9. Click Sign Up. You can now view and download portions of your medical record. 10. Click the Download Summary menu link to download a portable copy of your medical information. If you have questions, please visit the Frequently Asked Questions section of the AskBot website. Remember, AskBot is NOT to be used for urgent needs. For medical emergencies, dial 911. Now available from your iPhone and Android! Please provide this summary of care documentation to your next provider. Your primary care clinician is listed as Kalee Mcadams. If you have any questions after today's visit, please call [phone].

## 2018-08-03 NOTE — PROGRESS NOTES
Please let her know that her xray shows chronic changes to her spine. No fracture. Continue plan as discussed.

## 2018-09-25 ENCOUNTER — TELEPHONE (OUTPATIENT)
Dept: INTERNAL MEDICINE CLINIC | Age: 83
End: 2018-09-25

## 2018-09-25 NOTE — TELEPHONE ENCOUNTER
----- Message from Heidi Rea sent at 9/25/2018  4:10 PM EDT -----  Regarding: Dr. Roland Hernandez is requesting a call back in regards to her Melatonin medication.  Best contact number: [phone]

## 2018-09-30 RX ORDER — THYROID, PORCINE 90 MG/1
TABLET ORAL
Qty: 30 TAB | Refills: 0 | Status: SHIPPED | OUTPATIENT
Start: 2018-09-30 | End: 2018-11-01 | Stop reason: SDUPTHER

## 2018-10-01 RX ORDER — SERTRALINE HYDROCHLORIDE 50 MG/1
TABLET, FILM COATED ORAL
Qty: 90 TAB | Refills: 0 | Status: SHIPPED | OUTPATIENT
Start: 2018-10-01 | End: 2019-01-03 | Stop reason: SDUPTHER

## 2018-10-11 ENCOUNTER — OFFICE VISIT (OUTPATIENT)
Dept: INTERNAL MEDICINE CLINIC | Age: 83
End: 2018-10-11

## 2018-10-11 ENCOUNTER — HOSPITAL ENCOUNTER (OUTPATIENT)
Dept: LAB | Age: 83
Discharge: HOME | End: 2018-10-11
Payer: MEDICARE

## 2018-10-11 VITALS
SYSTOLIC BLOOD PRESSURE: 175 MMHG | HEART RATE: 65 BPM | TEMPERATURE: 98.3 F | HEIGHT: 65 IN | DIASTOLIC BLOOD PRESSURE: 71 MMHG | RESPIRATION RATE: 16 BRPM | WEIGHT: 162 LBS | OXYGEN SATURATION: 98 % | BODY MASS INDEX: 26.99 KG/M2

## 2018-10-11 DIAGNOSIS — R73.01 IMPAIRED FASTING GLUCOSE: ICD-10-CM

## 2018-10-11 DIAGNOSIS — R41.89 COGNITIVE DECLINE: ICD-10-CM

## 2018-10-11 DIAGNOSIS — E03.9 ACQUIRED HYPOTHYROIDISM: ICD-10-CM

## 2018-10-11 DIAGNOSIS — E78.5 DYSLIPIDEMIA: ICD-10-CM

## 2018-10-11 DIAGNOSIS — E55.9 VITAMIN D DEFICIENCY: ICD-10-CM

## 2018-10-11 DIAGNOSIS — R05.9 COUGH: Primary | ICD-10-CM

## 2018-10-11 DIAGNOSIS — I10 ESSENTIAL HYPERTENSION: ICD-10-CM

## 2018-10-11 DIAGNOSIS — E53.8 B12 DEFICIENCY: ICD-10-CM

## 2018-10-11 PROCEDURE — 82306 VITAMIN D 25 HYDROXY: CPT

## 2018-10-11 PROCEDURE — 36415 COLL VENOUS BLD VENIPUNCTURE: CPT

## 2018-10-11 PROCEDURE — 80053 COMPREHEN METABOLIC PANEL: CPT

## 2018-10-11 PROCEDURE — 82746 ASSAY OF FOLIC ACID SERUM: CPT

## 2018-10-11 PROCEDURE — 83036 HEMOGLOBIN GLYCOSYLATED A1C: CPT

## 2018-10-11 PROCEDURE — 84443 ASSAY THYROID STIM HORMONE: CPT

## 2018-10-11 PROCEDURE — 84439 ASSAY OF FREE THYROXINE: CPT

## 2018-10-11 PROCEDURE — 82607 VITAMIN B-12: CPT

## 2018-10-11 NOTE — PROGRESS NOTES
HISTORY OF PRESENT ILLNESS Eduardo Tristan is a 80 y.o. female. HPI Cough: Pt feels that \"liquids, not food, go down wrong pipe. \" She needs to cough to alleviate symptom. Cognitive Decline: Stable. Pt continues to f/u with Ronen Chou (cognitive therapist) for memory. She notes that therapist requests certain labs to be performed (vitamin B12, vitamin D, folate, TSH, free T4, and glucose/A1c). Hypertension ROS: taking medications as instructed, no medication side effects noted, no TIA's, no chest pain on exertion, no dyspnea on exertion, no swelling of ankles. New concerns:  Patient's BP in office today is 175/71. She continues on Losartan.  
 
hypothyroidism. Lab Results Component Value Date/Time TSH 0.926 08/30/2017 12:41 PM  
 
Thyroid ROS: denies fatigue, weight changes, heat/cold intolerance, bowel/skin changes or CVS symptoms. Pt continues on [place] Thyroid. Dyslipidemia: 
Cardiovascular risk analysis - 80 y.o. female LDL goal is under 130. ROS: no TIA's, no chest pain on exertion, no dyspnea on exertion, no swelling of ankles. New concerns: Her last LDL was 160 on 8/30/17. Pt is currently not taking cholesterol meds. Review of Systems All other systems reviewed and are negative. Physical Exam  
Constitutional: She is oriented to person, place, and time. She appears well-developed and well-nourished. HENT:  
Head: Normocephalic and atraumatic. Right Ear: External ear normal.  
Left Ear: External ear normal.  
Nose: Nose normal.  
Mouth/Throat: Oropharynx is clear and moist.  
Eyes: Conjunctivae and EOM are normal.  
Neck: Normal range of motion. Neck supple. Carotid bruit is not present. No thyroid mass and no thyromegaly present. Cardiovascular: Normal rate, regular rhythm, S1 normal, S2 normal, normal heart sounds and intact distal pulses.    
Pulmonary/Chest: Effort normal and breath sounds normal.  
 Abdominal: Soft. Normal appearance and bowel sounds are normal. There is no hepatosplenomegaly. There is no tenderness. Musculoskeletal: Normal range of motion. Neurological: She is alert and oriented to person, place, and time. She has normal strength. No cranial nerve deficit or sensory deficit. Coordination normal.  
Skin: Skin is warm, dry and intact. No abrasion and no rash noted. Psychiatric: She has a normal mood and affect. Her behavior is normal. Judgment and thought content normal.  
Nursing note and vitals reviewed. ASSESSMENT and PLAN Diagnoses and all orders for this visit: 1. Cough Stable condition. Pt will f/u for swallowing test. Will monitor for imaging results. -     XR BA SWALLOW ESOPHOGRAM; Future 2. Cognitive decline Stable condition. Pt will f/u with neurologist. Referral given. She will continue to f/u with Mariana Castillo (cognitive therapist) who requests that labs (vitamin B12, vitamin D, folate, TSH, free T4, and glucose/A1c) be sent to [phone]. I am not sure if  
-     [facility] ref [facility] 3. Essential hypertension BP is at goal. I do not recommend any change in medications. 
-     METABOLIC PANEL, COMPREHENSIVE 4. Acquired hypothyroidism Thyroid stable. I do not recommend a change in medications. 
-     TSH 3RD GENERATION 
-     T4, FREE 5. Dyslipidemia Stable condition. Pt is currently not taking cholesterol meds. 6. Vitamin D deficiency Will monitor for lab results. -     VITAMIN D, 25 HYDROXY 7. Impaired fasting glucose Will monitor for lab results.  
-     HEMOGLOBIN A1C WITH EAG 
 
8. B12 deficiency Will monitor for lab results. -     FOLATE 
-     VITAMIN B12 Additional Comments: Pt will schedule 1-month f/u to reassess conditions. Lab results and schedule of future lab studies reviewed with patient. Reviewed diet, exercise and weight control.  
 
Written by Dexter Chavez, as dictated by June Mclean MD. Current diagnosis and concerns discussed with pt at length. Understands risks and benefits or current treatment plan and medications and accepts the treatment and medication with any possible risks. Pt asks appropriate questions which were answered. Pt instructed to call with any concerns or problems.

## 2018-10-12 LAB
25(OH)D3+25(OH)D2 SERPL-MCNC: 33.7 NG/ML (ref 30–100)
ALBUMIN SERPL-MCNC: 3.9 G/DL (ref 3.5–4.7)
ALBUMIN/GLOB SERPL: 1.5 {RATIO} (ref 1.2–2.2)
ALP SERPL-CCNC: 76 IU/L (ref 39–117)
ALT SERPL-CCNC: 18 IU/L (ref 0–32)
AST SERPL-CCNC: 17 IU/L (ref 0–40)
BILIRUB SERPL-MCNC: 0.2 MG/DL (ref 0–1.2)
BUN SERPL-MCNC: 18 MG/DL (ref 8–27)
BUN/CREAT SERPL: 25 (ref 12–28)
CALCIUM SERPL-MCNC: 9.3 MG/DL (ref 8.7–10.3)
CHLORIDE SERPL-SCNC: 110 MMOL/L (ref 96–106)
CO2 SERPL-SCNC: 21 MMOL/L (ref 20–29)
CREAT SERPL-MCNC: 0.72 MG/DL (ref 0.57–1)
EST. AVERAGE GLUCOSE BLD GHB EST-MCNC: 108 MG/DL
FOLATE SERPL-MCNC: >20 NG/ML
GLOBULIN SER CALC-MCNC: 2.6 G/DL (ref 1.5–4.5)
GLUCOSE SERPL-MCNC: 80 MG/DL (ref 65–99)
HBA1C MFR BLD: 5.4 % (ref 4.8–5.6)
POTASSIUM SERPL-SCNC: 3.9 MMOL/L (ref 3.5–5.2)
PROT SERPL-MCNC: 6.5 G/DL (ref 6–8.5)
SODIUM SERPL-SCNC: 146 MMOL/L (ref 134–144)
T4 FREE SERPL-MCNC: 0.84 NG/DL (ref 0.82–1.77)
TSH SERPL DL<=0.005 MIU/L-ACNC: 0.63 UIU/ML (ref 0.45–4.5)
VIT B12 SERPL-MCNC: 971 PG/ML (ref 232–1245)

## 2018-11-14 RX ORDER — LOSARTAN POTASSIUM 50 MG/1
TABLET ORAL
Qty: 180 TAB | Refills: 1 | Status: SHIPPED | OUTPATIENT
Start: 2018-11-14 | End: 2019-01-28 | Stop reason: ALTCHOICE

## 2018-11-14 NOTE — TELEPHONE ENCOUNTER
Pt called to request a 90 day refill on losartan 50 mg [facility] [address], [place] [phone]

## 2018-11-29 ENCOUNTER — TELEPHONE (OUTPATIENT)
Dept: INTERNAL MEDICINE CLINIC | Age: 83
End: 2018-11-29

## 2018-11-29 RX ORDER — LEVOTHYROXINE AND LIOTHYRONINE 57; 13.5 UG/1; UG/1
90 TABLET ORAL DAILY
Qty: 90 TAB | Refills: 1 | Status: SHIPPED | OUTPATIENT
Start: 2018-11-29 | End: 2018-11-29 | Stop reason: SDUPTHER

## 2018-11-29 RX ORDER — LEVOTHYROXINE AND LIOTHYRONINE 57; 13.5 UG/1; UG/1
90 TABLET ORAL DAILY
Qty: 90 TAB | Refills: 3 | Status: SHIPPED | OUTPATIENT
Start: 2018-11-29 | End: 2019-11-25 | Stop reason: SDUPTHER

## 2018-11-29 NOTE — TELEPHONE ENCOUNTER
----- Message from Reuben Nascimento sent at 11/29/2018  3:02 PM EST -----  Regarding: Dr. Cecy Rubi  Pt is requesting a refill on \"[place] thyroid\" 90mg 90 day supply. [facility] near [facility].  Best contact: [phone]      [facility]   [address] [place] [address]  [phone]

## 2019-01-03 RX ORDER — SERTRALINE HYDROCHLORIDE 50 MG/1
TABLET, FILM COATED ORAL
Qty: 90 TAB | Refills: 0 | Status: SHIPPED | OUTPATIENT
Start: 2019-01-03 | End: 2019-03-26 | Stop reason: SDUPTHER

## 2019-01-28 ENCOUNTER — OFFICE VISIT (OUTPATIENT)
Dept: INTERNAL MEDICINE CLINIC | Age: 84
End: 2019-01-28

## 2019-01-28 VITALS
HEART RATE: 60 BPM | OXYGEN SATURATION: 97 % | WEIGHT: 160.8 LBS | HEIGHT: 65 IN | RESPIRATION RATE: 16 BRPM | BODY MASS INDEX: 26.79 KG/M2 | SYSTOLIC BLOOD PRESSURE: 186 MMHG | TEMPERATURE: 97.8 F | DIASTOLIC BLOOD PRESSURE: 83 MMHG

## 2019-01-28 DIAGNOSIS — E03.9 ACQUIRED HYPOTHYROIDISM: ICD-10-CM

## 2019-01-28 DIAGNOSIS — M25.551 RIGHT HIP PAIN: ICD-10-CM

## 2019-01-28 DIAGNOSIS — I10 ESSENTIAL HYPERTENSION: ICD-10-CM

## 2019-01-28 DIAGNOSIS — F34.1 DYSTHYMIA: ICD-10-CM

## 2019-01-28 DIAGNOSIS — Z00.00 MEDICARE ANNUAL WELLNESS VISIT, SUBSEQUENT: Primary | ICD-10-CM

## 2019-01-28 RX ORDER — LOSARTAN POTASSIUM AND HYDROCHLOROTHIAZIDE 12.5; 1 MG/1; MG/1
1 TABLET ORAL DAILY
Qty: 30 TAB | Refills: 3 | Status: SHIPPED | OUTPATIENT
Start: 2019-01-28 | End: 2019-06-01 | Stop reason: SDUPTHER

## 2019-01-28 NOTE — PROGRESS NOTES
HISTORY OF PRESENT ILLNESS Ethelle Mcardle is a 80 y.o. female. HPI Right Hip Pain: Pt c/o right hip buckling and feeling of \"right hip coming out of socket. \" She confirms periodic, mild twinges in right hip. She also c/o sharp pain on top of right foot. Hypertension ROS: taking medications as instructed, no medication side effects noted, no TIA's, no chest pain on exertion, no dyspnea on exertion, no swelling of ankles. New concerns:  Patient's BP in office today is 186/83. She continues on Losartan. She experienced lightheadedness 1 day ago. Mood: Stable, and well-managed with Zoloft.  
 
hypothyroidism. Lab Results Component Value Date/Time TSH 0.628 10/11/2018 04:10 PM  
 
Thyroid ROS: denies fatigue, weight changes, heat/cold intolerance, bowel/skin changes or CVS symptoms. Pt continues on [place] Thyroid. Review of Systems All other systems reviewed and are negative. Physical Exam  
Constitutional: She is oriented to person, place, and time. She appears well-developed and well-nourished. HENT:  
Head: Normocephalic and atraumatic. Right Ear: External ear normal.  
Left Ear: External ear normal.  
Nose: Nose normal.  
Mouth/Throat: Oropharynx is clear and moist.  
Eyes: Conjunctivae and EOM are normal.  
Neck: Normal range of motion. Neck supple. Carotid bruit is not present. No thyroid mass and no thyromegaly present. Cardiovascular: Normal rate, regular rhythm, S1 normal, S2 normal, normal heart sounds and intact distal pulses. Pulmonary/Chest: Effort normal and breath sounds normal.  
Abdominal: Soft. Normal appearance and bowel sounds are normal. There is no hepatosplenomegaly. There is no tenderness. Musculoskeletal: Normal range of motion. Neurological: She is alert and oriented to person, place, and time. She has normal strength. No cranial nerve deficit or sensory deficit.  Coordination normal.  
 Skin: Skin is warm, dry and intact. No abrasion and no rash noted. Psychiatric: She has a normal mood and affect. Her behavior is normal. Judgment and thought content normal.  
Nursing note and vitals reviewed. ASSESSMENT and PLAN Diagnoses and all orders for this visit: 1. Right hip pain Pt will f/u with Dr. Jonah Mayfield for further evaluation. Referral given. -     REFERRAL TO ORTHOPEDIC SURGERY 2. Essential hypertension BP elevated. Prescribed Losartan-HCTZ to replace Losartan. Pt will schedule 1-month f/u to reassess BP. 
-     losartan-hydroCHLOROthiazide (HYZAAR) 100-12.5 mg per tablet; Take 1 Tab by mouth daily. Please deliver 3. Dysthymia Stable, and doing well. Continue current medications. 4. Acquired hypothyroidism Thyroid stable. I do not recommend a change in medications. This note will not be viewable in [address] Lab results and schedule of future lab studies reviewed with patient. Reviewed diet, exercise and weight control. Written by Savage Breen, as dictated by Natalie Priest MD.  
 
Current diagnosis and concerns discussed with pt at length. Understands risks and benefits or current treatment plan and medications and accepts the treatment and medication with any possible risks. Pt asks appropriate questions which were answered. Pt instructed to call with any concerns or problems.

## 2019-01-28 NOTE — PROGRESS NOTES
This is the Subsequent Medicare Annual Wellness Exam, performed 12 months or more after the Initial AWV or the last Subsequent AWV I have reviewed the patient's medical history in detail and updated the computerized patient record. History Past Medical History:  
Diagnosis Date  Depression  Hypercholesterolemia  Thyroid disease   
 hypothyroid Past Surgical History:  
Procedure Laterality Date  HX CATARACT REMOVAL Bilateral   
 HX DILATION AND CURETTAGE    
 more than once-can't remember  HX ORTHOPAEDIC Bilateral   
 sena's neuroma  HX ORTHOPAEDIC Right Right finger  HX ROTATOR CUFF REPAIR Right  HX TONSILLECTOMY  HX UROLOGICAL \"bladder lift\" Current Outpatient Medications Medication Sig Dispense Refill  losartan-hydroCHLOROthiazide (HYZAAR) 100-12.5 mg per tablet Take 1 Tab by mouth daily. Please deliver 30 Tab 3  
 sertraline (ZOLOFT) 50 mg tablet TAKE ONE TABLET BY MOUTH EVERY DAY 90 Tab 0  
 thyroid, Pork, (ARMOUR THYROID) 90 mg tablet Take 1 Tab by mouth daily. I sent to [facility] - 80 Tab 3  
 astragalus root (ASTRAGALUS PO) Take  by mouth.  cholecalciferol (VITAMIN D3) 1,000 unit cap Take  by mouth daily.  KRILL OIL PO Take 1 Cap by mouth daily.  cyanocobalamin, vitamin B-12, 2,500 mcg tab Take  by mouth.  co-enzyme Q-10 (CO Q-10) 100 mg capsule Take 100 mg by mouth daily.  melatonin 3 mg tablet Take 3 mg by mouth nightly. Allergies Allergen Reactions  Demerol [Meperidine] Other (comments) Doesn't remember but states reaction was when she had first child  Pcn [Penicillins] Hives Family History Problem Relation Age of Onset  Stroke Mother  Cancer Father Brain & Lung  Diabetes Sister  Stroke Sister  Diabetes Brother  Diabetes Maternal Grandmother  Cancer Sister  Heart Disease Brother Social History Tobacco Use  
  Smoking status: Never Smoker  Smokeless tobacco: Never Used Substance Use Topics  Alcohol use: No  
 
Patient Active Problem List  
Diagnosis Code  Essential hypertension I10  Dysthymia F34.1  Dyslipidemia E78.5  Acquired hypothyroidism E03.9 Depression Risk Factor Screening: PHQ over the last two weeks 3/22/2018 Little interest or pleasure in doing things Not at all Feeling down, depressed, irritable, or hopeless Not at all Total Score PHQ 2 0 Alcohol Risk Factor Screening:  
 
 
Functional Ability and Level of Safety:  
Hearing Loss Activities of Daily Living The home contains:  
 
 
Fall Risk Fall Risk Assessment, last 12 mths 10/11/2018 Able to walk? Yes Fall in past 12 months? No  
Fall with injury? -  
Number of falls in past 12 months - Fall Risk Score -  
 
 
Abuse Screen Cognitive Screening Evaluation of Cognitive Function: 
Has your family/caregiver stated any concerns about your memory:  
 
 
Patient Care Team  
Patient Care Team: 
Jean Manzano MD as PCP - General (Internal Medicine) Assessment/Plan Education and counseling provided: 
 
 
Diagnoses and all orders for this visit: 1. Right hip pain 
-     REFERRAL TO ORTHOPEDIC SURGERY 2. Essential hypertension 
-     losartan-hydroCHLOROthiazide (HYZAAR) 100-12.5 mg per tablet; Take 1 Tab by mouth daily. Please deliver 3. Dysthymia 4. Acquired hypothyroidism This note will not be viewable in [address] There are no preventive care reminders to display for this patient.

## 2019-01-28 NOTE — PATIENT INSTRUCTIONS
Medicare Wellness Visit, Female The best way to live healthy is to have a lifestyle where you eat a well-balanced diet, exercise regularly, limit alcohol use, and quit all forms of tobacco/nicotine, if applicable. Regular preventive services are another way to keep healthy. Preventive services (vaccines, screening tests, monitoring & exams) can help personalize your care plan, which helps you manage your own care. Screening tests can find health problems at the earliest stages, when they are easiest to treat. [facility] follows the current, evidence-based guidelines published by the [facility] Terrance Kobe ([facility]) when recommending preventive services for our patients. Because we follow these guidelines, sometimes recommendations change over time as research supports it. (For example, mammograms used to be recommended annually. Even though Medicare will still pay for an annual mammogram, the newer guidelines recommend a mammogram every two years for women of average risk.) Of course, you and your doctor may decide to screen more often for some diseases, based on your risk and your health status. Preventive services for you include: - Medicare offers their members a free annual wellness visit, which is time for you and your primary care provider to discuss and plan for your preventive service needs. Take advantage of this benefit every year! 
-All adults over the age of 72 should receive the recommended pneumonia vaccines. Current USPSTF guidelines recommend a series of two vaccines for the best pneumonia protection.  
-All adults should have a flu vaccine yearly and a tetanus vaccine every 10 years. All adults age 61 and older should receive a shingles vaccine once in their lifetime.   
-A bone mass density test is recommended when a woman turns 65 to screen for osteoporosis. This test is only recommended one time, as a screening. Some providers will use this same test as a disease monitoring tool if you already have osteoporosis. -All adults age 38-68 who are overweight should have a diabetes screening test once every three years.  
-Other screening tests and preventive services for persons with diabetes include: an eye exam to screen for diabetic retinopathy, a kidney function test, a foot exam, and stricter control over your cholesterol.  
-Cardiovascular screening for adults with routine risk involves an electrocardiogram (ECG) at intervals determined by your doctor.  
-Colorectal cancer screenings should be done for adults age 54-65 with no increased risk factors for colorectal cancer. There are a number of acceptable methods of screening for this type of cancer. Each test has its own benefits and drawbacks. Discuss with your doctor what is most appropriate for you during your annual wellness visit. The different tests include: colonoscopy (considered the best screening method), a fecal occult blood test, a fecal DNA test, and sigmoidoscopy. -Breast cancer screenings are recommended every other year for women of normal risk, age 54-69. 
-Cervical cancer screenings for women over age 72 are only recommended with certain risk factors.  
-All adults born between [facility] should be screened once for Hepatitis C. Here is a list of your current Health Maintenance items (your personalized list of preventive services) with a due date: There are no preventive care reminders to display for this patient.

## 2019-02-11 ENCOUNTER — OFFICE VISIT (OUTPATIENT)
Dept: INTERNAL MEDICINE CLINIC | Age: 84
End: 2019-02-11

## 2019-02-11 ENCOUNTER — HOSPITAL ENCOUNTER (OUTPATIENT)
Dept: LAB | Age: 84
Discharge: HOME | End: 2019-02-11
Payer: MEDICARE

## 2019-02-11 VITALS
OXYGEN SATURATION: 96 % | WEIGHT: 160 LBS | SYSTOLIC BLOOD PRESSURE: 130 MMHG | HEIGHT: 65 IN | TEMPERATURE: 98.1 F | HEART RATE: 63 BPM | BODY MASS INDEX: 26.66 KG/M2 | RESPIRATION RATE: 20 BRPM | DIASTOLIC BLOOD PRESSURE: 52 MMHG

## 2019-02-11 DIAGNOSIS — I10 ESSENTIAL HYPERTENSION: Primary | ICD-10-CM

## 2019-02-11 DIAGNOSIS — F34.1 DYSTHYMIA: ICD-10-CM

## 2019-02-11 DIAGNOSIS — J01.00 ACUTE NON-RECURRENT MAXILLARY SINUSITIS: ICD-10-CM

## 2019-02-11 PROCEDURE — 36415 COLL VENOUS BLD VENIPUNCTURE: CPT

## 2019-02-11 PROCEDURE — 80048 BASIC METABOLIC PNL TOTAL CA: CPT

## 2019-02-11 RX ORDER — AZITHROMYCIN 250 MG/1
250 TABLET, FILM COATED ORAL SEE ADMIN INSTRUCTIONS
Qty: 6 TAB | Refills: 0 | Status: SHIPPED | OUTPATIENT
Start: 2019-02-11 | End: 2019-02-16

## 2019-02-11 NOTE — PROGRESS NOTES
HISTORY OF PRESENT ILLNESS Colten Mendez is a 80 y.o. female. HPI Sinusitis: Pt c/o fatigue, HA, and lightheadedness She has also been experiencing congestion and drainage for past month. Denies sinus pressure. She suspects infection or flu. She recently interacted with someone who has daughter with flu. Mood: Stable, and well-managed with Zoloft. Hypertension ROS: taking medications as instructed, no medication side effects noted, no TIA's, no chest pain on exertion, no dyspnea on exertion, no swelling of ankles. New concerns:  Patient's BP in office today is 130/52. She continues on Losartan-HCTZ. She has been experiencing urinary frequency. Review of Systems Constitutional: Positive for malaise/fatigue. HENT: Positive for congestion. Neurological: Positive for headaches. All other systems reviewed and are negative. Physical Exam  
Constitutional: She is oriented to person, place, and time. She appears well-developed and well-nourished. HENT:  
Head: Normocephalic and atraumatic. Right Ear: External ear normal.  
Left Ear: External ear normal.  
Nose: Nose normal.  
Mouth/Throat: Oropharynx is clear and moist.  
Bilateral ear fullness. Eyes: Conjunctivae and EOM are normal.  
Neck: Normal range of motion. Neck supple. Carotid bruit is not present. No thyroid mass and no thyromegaly present. Cardiovascular: Normal rate, regular rhythm, S1 normal, S2 normal, normal heart sounds and intact distal pulses. Pulmonary/Chest: Effort normal and breath sounds normal.  
Abdominal: Soft. Normal appearance and bowel sounds are normal. There is no hepatosplenomegaly. There is no tenderness. Musculoskeletal: Normal range of motion. Neurological: She is alert and oriented to person, place, and time. She has normal strength. No cranial nerve deficit or sensory deficit. Coordination normal.  
Skin: Skin is warm, dry and intact. No abrasion and no rash noted. Psychiatric: She has a normal mood and affect. Her behavior is normal. Judgment and thought content normal.  
Nursing note and vitals reviewed. ASSESSMENT and PLAN Diagnoses and all orders for this visit: 1. Essential hypertension BP is at goal. I do not recommend any change in medications. Will monitor for lab results, to monitor potassium levels. -     METABOLIC PANEL, BASIC 2. Dysthymia Stable, and doing well. Continue current medications. 3. Acute non-recurrent maxillary sinusitis Prescribed Azithromycin. Will monitor for any changes or improvements. -     azithromycin (ZITHROMAX) 250 mg tablet; Take 1 Tab by mouth See Admin Instructions for 5 days. Additional Comments: Pt will schedule routine, 3-month f/u. Lab results and schedule of future lab studies reviewed with patient. Reviewed diet, exercise and weight control. Written by Quintin Greene, as dictated by Mley Rodriguez MD.  
 
Current diagnosis and concerns discussed with pt at length. Understands risks and benefits or current treatment plan and medications and accepts the treatment and medication with any possible risks. Pt asks appropriate questions which were answered. Pt instructed to call with any concerns or problems. This note will not be viewable in [address]

## 2019-02-12 LAB
BUN SERPL-MCNC: 16 MG/DL (ref 8–27)
BUN/CREAT SERPL: 21 (ref 12–28)
CALCIUM SERPL-MCNC: 9.9 MG/DL (ref 8.7–10.3)
CHLORIDE SERPL-SCNC: 104 MMOL/L (ref 96–106)
CO2 SERPL-SCNC: 19 MMOL/L (ref 20–29)
CREAT SERPL-MCNC: 0.77 MG/DL (ref 0.57–1)
GLUCOSE SERPL-MCNC: 80 MG/DL (ref 65–99)
POTASSIUM SERPL-SCNC: 4.3 MMOL/L (ref 3.5–5.2)
SODIUM SERPL-SCNC: 141 MMOL/L (ref 134–144)

## 2019-03-27 RX ORDER — SERTRALINE HYDROCHLORIDE 50 MG/1
TABLET, FILM COATED ORAL
Qty: 90 TAB | Refills: 0 | Status: SHIPPED | OUTPATIENT
Start: 2019-03-27 | End: 2019-07-12 | Stop reason: SDUPTHER

## 2019-04-12 ENCOUNTER — OFFICE VISIT (OUTPATIENT)
Dept: INTERNAL MEDICINE CLINIC | Age: 84
End: 2019-04-12

## 2019-04-12 VITALS
BODY MASS INDEX: 27.16 KG/M2 | SYSTOLIC BLOOD PRESSURE: 156 MMHG | HEIGHT: 65 IN | TEMPERATURE: 97.8 F | OXYGEN SATURATION: 98 % | HEART RATE: 64 BPM | WEIGHT: 163 LBS | RESPIRATION RATE: 16 BRPM | DIASTOLIC BLOOD PRESSURE: 80 MMHG

## 2019-04-12 DIAGNOSIS — M17.11 OSTEOARTHRITIS OF RIGHT KNEE, UNSPECIFIED OSTEOARTHRITIS TYPE: ICD-10-CM

## 2019-04-12 DIAGNOSIS — E03.9 ACQUIRED HYPOTHYROIDISM: ICD-10-CM

## 2019-04-12 DIAGNOSIS — I10 ESSENTIAL HYPERTENSION: Primary | ICD-10-CM

## 2019-04-12 DIAGNOSIS — F34.1 DYSTHYMIA: ICD-10-CM

## 2019-04-12 RX ORDER — AMLODIPINE BESYLATE 2.5 MG/1
2.5 TABLET ORAL
Qty: 30 TAB | Refills: 1 | Status: SHIPPED | OUTPATIENT
Start: 2019-04-12 | End: 2019-11-25

## 2019-04-12 NOTE — PROGRESS NOTES
Terra Negron is a 80 y.o. female who presents today for Hypertension Sean Andrade She has a history of  
Patient Active Problem List  
Diagnosis Code  Essential hypertension I10  Dysthymia F34.1  Dyslipidemia E78.5  Acquired hypothyroidism E03.9 Sean Andrade Today patient is here for an acute visit. Sean Andrade Hypertension -blood pressures been elevated at home. Blood pressure in the office is borderline elevated initially and a bit higher on repeat. Pt had leftover losartan 50mg and took an additional tablet. Nurse also took her BP yesterday and this was in the 180's. Losartan dose increased in January. Having a bit more Knee pain recently which may be triggering elevated blood pressure. Hypertension ROS: taking medications as instructed, no medication side effects noted, no TIA's, no chest pain on exertion, no dyspnea on exertion, no swelling of ankles     reports that she has never smoked. She has never used smokeless tobacco.    reports that she does not drink alcohol. BP Readings from Last 2 Encounters:  
04/12/19 156/80  
02/11/19 130/52 R knee OA: getting a bit worse. Needs to see PT. Will put in an order. Hypothyroidism: will repeat next month especially in light of elevated BP. She notes that her mental health overall is been stable. She enjoys dancing Robert. Her SSRI dose has been stable and this is likely not causing any issues with her blood pressure. ROS Review of Systems Constitutional: Negative for chills, fever and weight loss. HENT: Negative for ear discharge, ear pain, hearing loss, nosebleeds and tinnitus. Eyes: Negative for blurred vision, double vision, photophobia and pain. Respiratory: Negative for cough and shortness of breath. Cardiovascular: Negative for chest pain, palpitations, orthopnea and leg swelling. Gastrointestinal: Negative for abdominal pain, constipation, diarrhea, heartburn, nausea and vomiting. Genitourinary: Negative for dysuria, frequency and urgency. Musculoskeletal: Positive for back pain and joint pain. Skin: Negative for itching and rash. Neurological: Negative. Endo/Heme/Allergies: Does not bruise/bleed easily. Psychiatric/Behavioral: Negative for depression. The patient is not nervous/anxious. Visit Vitals /80 Pulse 64 Temp 97.8 °F (36.6 °C) (Oral) Resp 16 Ht 5' 5\" (1.651 m) Wt 163 lb (73.9 kg) SpO2 98% BMI 27.12 kg/m² Physical Exam  
Constitutional: She is oriented to person, place, and time. She appears well-developed and well-nourished. No distress. HENT:  
Head: Normocephalic and atraumatic. Neck: Normal range of motion. Neck supple. No thyromegaly present. Cardiovascular: Normal rate and regular rhythm. No murmur heard. Pulmonary/Chest: Effort normal and breath sounds normal. She has no wheezes. Abdominal: Soft. Bowel sounds are normal. She exhibits no distension. Musculoskeletal: She exhibits no edema. Crepitus to right knee. No effusion Neurological: She is alert and oriented to person, place, and time. No cranial nerve deficit. Skin: Skin is warm and dry. Psychiatric: She has a normal mood and affect. Her behavior is normal.  
 
 
 
Current Outpatient Medications Medication Sig  
 amLODIPine (NORVASC) 2.5 mg tablet Take 1 Tab by mouth nightly.  sertraline (ZOLOFT) 50 mg tablet TAKE ONE TABLET BY MOUTH EVERY DAY  losartan-hydroCHLOROthiazide (HYZAAR) 100-12.5 mg per tablet Take 1 Tab by mouth daily. Please deliver  thyroid, Pork, (ARMOUR THYROID) 90 mg tablet Take 1 Tab by mouth daily. I sent to [facility] -  
 astragalus root (ASTRAGALUS PO) Take  by mouth.  cholecalciferol (VITAMIN D3) 1,000 unit cap Take  by mouth daily.  KRILL OIL PO Take 1 Cap by mouth daily.  cyanocobalamin, vitamin B-12, 2,500 mcg tab Take  by mouth.  co-enzyme Q-10 (CO Q-10) 100 mg capsule Take 100 mg by mouth daily.  melatonin 3 mg tablet Take 3 mg by mouth nightly. No current facility-administered medications for this visit. Past Medical History:  
Diagnosis Date  Depression  Hypercholesterolemia  Thyroid disease   
 hypothyroid Past Surgical History:  
Procedure Laterality Date  HX CATARACT REMOVAL Bilateral   
 HX DILATION AND CURETTAGE    
 more than once-can't remember  HX ORTHOPAEDIC Bilateral   
 sena's neuroma  HX ORTHOPAEDIC Right Right finger  HX ROTATOR CUFF REPAIR Right  HX TONSILLECTOMY  HX UROLOGICAL \"bladder lift\" Social History Tobacco Use  Smoking status: Never Smoker  Smokeless tobacco: Never Used Substance Use Topics  Alcohol use: No  
  
Family History Problem Relation Age of Onset  Stroke Mother  Cancer Father Brain & Lung  Diabetes Sister  Stroke Sister  Diabetes Brother  Diabetes Maternal Grandmother  Cancer Sister  Heart Disease Brother Allergies Allergen Reactions  Demerol [Meperidine] Other (comments) Doesn't remember but states reaction was when she had first child  Pcn [Penicillins] Hives Assessment/Plan Diagnoses and all orders for this visit: 1. Essential hypertension -I discussed what acute elevations of blood pressure I am concerned about and did not over take ARB as this will not benefit her blood pressure. We will add very low-dose calcium channel blocker to her current therapy as I do not want to go up on her diuretic. Patient will monitor her blood pressure and will compare her blood pressure machine to the nurses at [facility]. Follow-up with PCP in about a month for blood pressure check. -     amLODIPine (NORVASC) 2.5 mg tablet; Take 1 Tab by mouth nightly.  
 
2. Osteoarthritis of right knee, unspecified osteoarthritis type -her knee has been bothering her more. She is been asking for physical therapy referral will provide her one today 
-     REFERRAL TO PHYSICAL THERAPY 3. Acquired hypothyroidism -discussed need to repeat her TSH with PCP as her blood pressures been a bit up and her TSH was borderline low last visit. 4. Dysthymia -mood stable no changes needed Follow-up and Dispositions · Return in about 1 month (around 5/10/2019). Ashish Buckley MD 
4/12/2019 This note was created with the help of speech recognition software (Dragon) and may contain some 'sound alike' errors.

## 2019-05-20 ENCOUNTER — OFFICE VISIT (OUTPATIENT)
Dept: INTERNAL MEDICINE CLINIC | Age: 84
End: 2019-05-20

## 2019-05-20 ENCOUNTER — HOSPITAL ENCOUNTER (OUTPATIENT)
Dept: LAB | Age: 84
Discharge: HOME | End: 2019-05-20
Payer: MEDICARE

## 2019-05-20 VITALS
DIASTOLIC BLOOD PRESSURE: 64 MMHG | HEART RATE: 64 BPM | OXYGEN SATURATION: 97 % | WEIGHT: 161.2 LBS | HEIGHT: 65 IN | TEMPERATURE: 98.1 F | RESPIRATION RATE: 18 BRPM | BODY MASS INDEX: 26.86 KG/M2 | SYSTOLIC BLOOD PRESSURE: 110 MMHG

## 2019-05-20 DIAGNOSIS — E03.9 ACQUIRED HYPOTHYROIDISM: ICD-10-CM

## 2019-05-20 DIAGNOSIS — F34.1 DYSTHYMIA: ICD-10-CM

## 2019-05-20 DIAGNOSIS — N39.0 URINARY TRACT INFECTION WITHOUT HEMATURIA, SITE UNSPECIFIED: Primary | ICD-10-CM

## 2019-05-20 DIAGNOSIS — R53.1 WEAKNESS: ICD-10-CM

## 2019-05-20 DIAGNOSIS — I10 ESSENTIAL HYPERTENSION: ICD-10-CM

## 2019-05-20 LAB
BILIRUB UR QL STRIP: NEGATIVE
GLUCOSE UR-MCNC: NEGATIVE MG/DL
KETONES P FAST UR STRIP-MCNC: NEGATIVE MG/DL
PH UR STRIP: 6 [PH] (ref 4.6–8)
PROT UR QL STRIP: NEGATIVE
SP GR UR STRIP: 1.01 (ref 1–1.03)
UA UROBILINOGEN AMB POC: NORMAL (ref 0.2–1)
URINALYSIS CLARITY POC: CLEAR
URINALYSIS COLOR POC: YELLOW
URINE BLOOD POC: NEGATIVE
URINE LEUKOCYTES POC: NEGATIVE
URINE NITRITES POC: NEGATIVE

## 2019-05-20 PROCEDURE — 80053 COMPREHEN METABOLIC PANEL: CPT

## 2019-05-20 PROCEDURE — 36415 COLL VENOUS BLD VENIPUNCTURE: CPT

## 2019-05-20 PROCEDURE — 84439 ASSAY OF FREE THYROXINE: CPT

## 2019-05-20 PROCEDURE — 87086 URINE CULTURE/COLONY COUNT: CPT

## 2019-05-20 PROCEDURE — 85027 COMPLETE CBC AUTOMATED: CPT

## 2019-05-20 PROCEDURE — 80061 LIPID PANEL: CPT

## 2019-05-20 PROCEDURE — 84443 ASSAY THYROID STIM HORMONE: CPT

## 2019-05-20 NOTE — PROGRESS NOTES
HISTORY OF PRESENT ILLNESS Danielle Hamilton is a 80 y.o. female. HPI Presents with daughter. Hypertension ROS: taking medications as instructed, no medication side effects noted, no TIA's, no chest pain on exertion, no dyspnea on exertion, no swelling of ankles. New concerns:  Patient's BP in office today is 110/64. She reports BP averaging 130-40's/50-60's while off Amlodipine. She stopped taking Amlodipine after 1 week, due to side effects (fatigue, nausea). hypothyroidism. Lab Results Component Value Date/Time TSH 0.628 10/11/2018 04:10 PM  
 
Thyroid ROS: denies fatigue, weight changes, heat/cold intolerance, bowel/skin changes or CVS symptoms. Hyperlipidemia: 
Cardiovascular risk analysis - 80 y.o. female LDL goal is under 130. ROS: no TIA's, no chest pain on exertion, no dyspnea on exertion, no swelling of ankles. New concerns: Her last LDL was 160 on 8/30/17. Pt is currently not on cholesterol meds. Mood: Stable, and well-managed with Zoloft. UTI: Pt c/o urinary urgency. Weakness: Pt c/o abrupt, periodic feelings of weakness and \"something in chest.\" Denies nausea or association with certain positions. Pt c/o occasional tightness in left side of neck. Pt notes that prolapsed bladder surgery was ineffective. She inquires about alternative treatment options. Review of Systems All other systems reviewed and are negative. Physical Exam  
Constitutional: She is oriented to person, place, and time. She appears well-developed and well-nourished. HENT:  
Head: Normocephalic and atraumatic. Right Ear: External ear normal.  
Left Ear: External ear normal.  
Nose: Nose normal.  
Mouth/Throat: Oropharynx is clear and moist.  
Eyes: Conjunctivae and EOM are normal.  
Neck: Normal range of motion. Neck supple. Carotid bruit is not present. No thyroid mass and no thyromegaly present.   
Cardiovascular: Normal rate, regular rhythm, S1 normal, S2 normal, normal heart sounds and intact distal pulses. Pulmonary/Chest: Effort normal and breath sounds normal.  
Abdominal: Soft. Normal appearance and bowel sounds are normal. There is no hepatosplenomegaly. There is no tenderness. Musculoskeletal: Normal range of motion. Neurological: She is alert and oriented to person, place, and time. She has normal strength. No cranial nerve deficit or sensory deficit. Coordination normal.  
Skin: Skin is warm, dry and intact. No abrasion and no rash noted. Psychiatric: She has a normal mood and affect. Her behavior is normal. Judgment and thought content normal.  
Nursing note and vitals reviewed. ASSESSMENT and PLAN Diagnoses and all orders for this visit: 
 
1. Urinary tract infection without hematuria, site unspecified Will monitor for lab results, to determine whether to send in AB's for possible UTI. Advised pt to f/u with Dr. Poonam Peters to discuss alternative treatment plans for prolapsed bladder. Referral given. -     AMB POC URINALYSIS DIP STICK AUTO W/O MICRO 
-     CULTURE, URINE 
-     REFERRAL TO UROLOGY 2. Essential hypertension BP is at goal. I do not recommend any change in medications. Discussed that pt can take Amlodipine PRN for BP spikes. -     LIPID PANEL 
-     METABOLIC PANEL, COMPREHENSIVE 3. Acquired hypothyroidism Thyroid stable. I do not recommend a change in medications. -     T4, FREE 
-     TSH 3RD GENERATION 4. Weakness Stable condition. Will monitor for any changes or improvements. -     CBC W/O DIFF 5. Dysthymia Stable, and doing well. Continue current medications. Lab results and schedule of future lab studies reviewed with patient. Reviewed diet, exercise and weight control. Written by Diana Clark, as dictated by Terra Doty MD.  
 
Current diagnosis and concerns discussed with pt at length.  Understands risks and benefits or current treatment plan and medications and accepts the treatment and medication with any possible risks. Pt asks appropriate questions which were answered. Pt instructed to call with any concerns or problems. This note will not be viewable in [address]

## 2019-05-21 LAB
ALBUMIN SERPL-MCNC: 4 G/DL (ref 3.5–4.7)
ALBUMIN/GLOB SERPL: 1.7 {RATIO} (ref 1.2–2.2)
ALP SERPL-CCNC: 73 IU/L (ref 39–117)
ALT SERPL-CCNC: 15 IU/L (ref 0–32)
AST SERPL-CCNC: 19 IU/L (ref 0–40)
BILIRUB SERPL-MCNC: 0.5 MG/DL (ref 0–1.2)
BUN SERPL-MCNC: 17 MG/DL (ref 8–27)
BUN/CREAT SERPL: 20 (ref 12–28)
CALCIUM SERPL-MCNC: 9.4 MG/DL (ref 8.7–10.3)
CHLORIDE SERPL-SCNC: 105 MMOL/L (ref 96–106)
CHOLEST SERPL-MCNC: 248 MG/DL (ref 100–199)
CO2 SERPL-SCNC: 23 MMOL/L (ref 20–29)
CREAT SERPL-MCNC: 0.83 MG/DL (ref 0.57–1)
ERYTHROCYTE [DISTWIDTH] IN BLOOD BY AUTOMATED COUNT: 12.9 % (ref 12.3–15.4)
GLOBULIN SER CALC-MCNC: 2.4 G/DL (ref 1.5–4.5)
GLUCOSE SERPL-MCNC: 81 MG/DL (ref 65–99)
HCT VFR BLD AUTO: 40.7 % (ref 34–46.6)
HDLC SERPL-MCNC: 64 MG/DL
HGB BLD-MCNC: 13.5 G/DL (ref 11.1–15.9)
INTERPRETATION, 910389: NORMAL
LDLC SERPL CALC-MCNC: 144 MG/DL (ref 0–99)
MCH RBC QN AUTO: 30.7 PG (ref 26.6–33)
MCHC RBC AUTO-ENTMCNC: 33.2 G/DL (ref 31.5–35.7)
MCV RBC AUTO: 93 FL (ref 79–97)
PLATELET # BLD AUTO: 222 X10E3/UL (ref 150–450)
POTASSIUM SERPL-SCNC: 4.3 MMOL/L (ref 3.5–5.2)
PROT SERPL-MCNC: 6.4 G/DL (ref 6–8.5)
RBC # BLD AUTO: 4.4 X10E6/UL (ref 3.77–5.28)
SODIUM SERPL-SCNC: 142 MMOL/L (ref 134–144)
T4 FREE SERPL-MCNC: 0.78 NG/DL (ref 0.82–1.77)
TRIGL SERPL-MCNC: 199 MG/DL (ref 0–149)
TSH SERPL DL<=0.005 MIU/L-ACNC: 0.46 UIU/ML (ref 0.45–4.5)
VLDLC SERPL CALC-MCNC: 40 MG/DL (ref 5–40)
WBC # BLD AUTO: 7.6 X10E3/UL (ref 3.4–10.8)

## 2019-05-22 LAB — BACTERIA UR CULT: NORMAL

## 2019-06-01 DIAGNOSIS — I10 ESSENTIAL HYPERTENSION: ICD-10-CM

## 2019-06-02 RX ORDER — LOSARTAN POTASSIUM AND HYDROCHLOROTHIAZIDE 12.5; 1 MG/1; MG/1
TABLET ORAL
Qty: 30 TAB | Refills: 0 | Status: SHIPPED | OUTPATIENT
Start: 2019-06-02 | End: 2019-06-03 | Stop reason: SDUPTHER

## 2019-06-03 DIAGNOSIS — I10 ESSENTIAL HYPERTENSION: ICD-10-CM

## 2019-06-03 NOTE — TELEPHONE ENCOUNTER
Patient called to request a refill on her Hyzaar 100-12.5 MG tablet     Patient reports being completely out of medication & asked if it could be called into her pharmacy today.      [facility] - [address], [place]  [phone]

## 2019-06-04 RX ORDER — LOSARTAN POTASSIUM AND HYDROCHLOROTHIAZIDE 12.5; 1 MG/1; MG/1
TABLET ORAL
Qty: 30 TAB | Refills: 3 | Status: SHIPPED | OUTPATIENT
Start: 2019-06-04 | End: 2019-11-08 | Stop reason: SDUPTHER

## 2019-10-30 ENCOUNTER — TELEPHONE (OUTPATIENT)
Dept: INTERNAL MEDICINE CLINIC | Age: 84
End: 2019-10-30

## 2019-10-30 NOTE — TELEPHONE ENCOUNTER
BP this morning was 201/76  Recheck later in the day-Systolic 871  And then she had the nurse check and her systolic went down to 301. Reminded pt that Dr. Lizette Verdugo told her to take an Amlodipine for BP spike. She states that she did not remember her telling her that. Advised pt to take an Amlodipine and recheck BP tomorrow morning and to call and let me know what her reading is. Pt understood and was thankful for the call.

## 2019-11-08 DIAGNOSIS — I10 ESSENTIAL HYPERTENSION: ICD-10-CM

## 2019-11-08 RX ORDER — LOSARTAN POTASSIUM AND HYDROCHLOROTHIAZIDE 12.5; 1 MG/1; MG/1
TABLET ORAL
Qty: 30 TAB | Refills: 0 | Status: SHIPPED | OUTPATIENT
Start: 2019-11-08 | End: 2019-12-02 | Stop reason: SDUPTHER

## 2019-11-25 ENCOUNTER — HOSPITAL ENCOUNTER (OUTPATIENT)
Dept: LAB | Age: 84
Discharge: HOME | End: 2019-11-25

## 2019-11-25 ENCOUNTER — OFFICE VISIT (OUTPATIENT)
Dept: INTERNAL MEDICINE CLINIC | Age: 84
End: 2019-11-25

## 2019-11-25 VITALS
WEIGHT: 161 LBS | DIASTOLIC BLOOD PRESSURE: 57 MMHG | OXYGEN SATURATION: 95 % | BODY MASS INDEX: 26.82 KG/M2 | RESPIRATION RATE: 16 BRPM | SYSTOLIC BLOOD PRESSURE: 120 MMHG | TEMPERATURE: 98.7 F | HEIGHT: 65 IN | HEART RATE: 87 BPM

## 2019-11-25 DIAGNOSIS — M25.50 ARTHRALGIA, UNSPECIFIED JOINT: ICD-10-CM

## 2019-11-25 DIAGNOSIS — G47.9 SLEEP DISORDER: ICD-10-CM

## 2019-11-25 DIAGNOSIS — E53.8 B12 DEFICIENCY: ICD-10-CM

## 2019-11-25 DIAGNOSIS — R42 LIGHTHEADED: ICD-10-CM

## 2019-11-25 DIAGNOSIS — E03.9 ACQUIRED HYPOTHYROIDISM: ICD-10-CM

## 2019-11-25 DIAGNOSIS — N39.498 OTHER URINARY INCONTINENCE: ICD-10-CM

## 2019-11-25 DIAGNOSIS — R01.1 HEART MURMUR: ICD-10-CM

## 2019-11-25 DIAGNOSIS — F34.1 DYSTHYMIA: ICD-10-CM

## 2019-11-25 DIAGNOSIS — I10 ESSENTIAL HYPERTENSION: ICD-10-CM

## 2019-11-25 DIAGNOSIS — G62.9 NEUROPATHY: ICD-10-CM

## 2019-11-25 DIAGNOSIS — I10 ESSENTIAL HYPERTENSION: Primary | ICD-10-CM

## 2019-11-25 LAB
ALBUMIN SERPL-MCNC: 3.8 G/DL (ref 3.5–5)
ALBUMIN/GLOB SERPL: 1.2 {RATIO} (ref 1.1–2.2)
ALP SERPL-CCNC: 74 U/L (ref 45–117)
ALT SERPL-CCNC: 26 U/L (ref 12–78)
ANION GAP SERPL CALC-SCNC: 7 MMOL/L (ref 5–15)
AST SERPL-CCNC: 15 U/L (ref 15–37)
BILIRUB SERPL-MCNC: 0.4 MG/DL (ref 0.2–1)
BUN SERPL-MCNC: 23 MG/DL (ref 6–20)
BUN/CREAT SERPL: 24 (ref 12–20)
CALCIUM SERPL-MCNC: 9.7 MG/DL (ref 8.5–10.1)
CHLORIDE SERPL-SCNC: 110 MMOL/L (ref 97–108)
CO2 SERPL-SCNC: 24 MMOL/L (ref 21–32)
CREAT SERPL-MCNC: 0.96 MG/DL (ref 0.55–1.02)
ERYTHROCYTE [DISTWIDTH] IN BLOOD BY AUTOMATED COUNT: 11.8 % (ref 11.5–14.5)
GLOBULIN SER CALC-MCNC: 3.1 G/DL (ref 2–4)
GLUCOSE SERPL-MCNC: 134 MG/DL (ref 65–100)
HCT VFR BLD AUTO: 44.4 % (ref 35–47)
HGB BLD-MCNC: 14.4 G/DL (ref 11.5–16)
MCH RBC QN AUTO: 31.6 PG (ref 26–34)
MCHC RBC AUTO-ENTMCNC: 32.4 G/DL (ref 30–36.5)
MCV RBC AUTO: 97.4 FL (ref 80–99)
NRBC # BLD: 0 K/UL (ref 0–0.01)
NRBC BLD-RTO: 0 PER 100 WBC
PLATELET # BLD AUTO: 99 K/UL (ref 150–400)
POTASSIUM SERPL-SCNC: 4 MMOL/L (ref 3.5–5.1)
PROT SERPL-MCNC: 6.9 G/DL (ref 6.4–8.2)
RBC # BLD AUTO: 4.56 M/UL (ref 3.8–5.2)
SODIUM SERPL-SCNC: 141 MMOL/L (ref 136–145)
T4 FREE SERPL-MCNC: 0.7 NG/DL (ref 0.8–1.5)
TSH SERPL DL<=0.05 MIU/L-ACNC: 0.14 UIU/ML (ref 0.36–3.74)
VIT B12 SERPL-MCNC: 509 PG/ML (ref 193–986)
WBC # BLD AUTO: 7.5 K/UL (ref 3.6–11)

## 2019-11-25 RX ORDER — GABAPENTIN 300 MG/1
CAPSULE ORAL
Qty: 60 CAP | Refills: 1 | Status: SHIPPED | OUTPATIENT
Start: 2019-11-25 | End: 2020-01-02

## 2019-11-25 RX ORDER — THYROID, PORCINE 90 MG/1
TABLET ORAL
Qty: 90 TAB | Refills: 2 | Status: SHIPPED | OUTPATIENT
Start: 2019-11-25 | End: 2020-01-02

## 2019-11-25 NOTE — PROGRESS NOTES
HISTORY OF PRESENT ILLNESS  Mike Moeller is a 80 y.o. female. HPI  Hypertension ROS: taking medications as instructed, no medication side effects noted, no TIA's, no chest pain on exertion, no dyspnea on exertion, no swelling of ankles. New concerns: BP in office today is 120/57. Pt states that she has been taking Losartan-HCTZ reports taking 100-12.5 once per day since June. She switched to nightly compliance 7-10 days ago with no noted change in BP. Pt states that she wakes up every night to urinate every two to three hours. Hypothyroidism:    Lab Results   Component Value Date/Time    TSH 0.458 05/20/2019 12:52 PM     Thyroid ROS: denies fatigue, weight changes, heat/cold intolerance, bowel/skin changes or CVS symptoms. Does not see endocrinologist.    Hyperlipidemia:  Cardiovascular risk analysis - 80 y.o. female LDL goal is under 130. ROS: taking medications as instructed, no medication side effects noted, no TIA's, no chest pain on exertion, no dyspnea on exertion, no swelling of ankles. Tolerating meds, no myalgias or other side effects noted  New concerns: Pt's last LDL was 144 on 5/21/2019. Sleep disorder: Pt reports lightheadedness for a couple of months. She states that this morning, her symptoms have been worse than normal, possibly due to not sleeping well last night (fell asleep around 1 am and took longer than the normal hour to fall asleep). Pt states that she did not take anything last night to help her fall asleep except for a hot chocolate. Pt reports that this morning, she consumed a seed muffin, protein drink, and fruit. She does not associate her symptoms with her medications. She states that she takes three tablets of melatonin every night and wonders if this may be contributing to her lightheadedness. As such, pt reports that she has not taken melatonin for the past three nights; however, the symptoms have persisted.  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 Anxiety/depression: Pt states that she \"feels calm\" and does not report increased stress/anxiety. Cut down on exercising as a result of the recent jewelry show. Pt reports complying with her Zoloft prescription. Neuropathy: Pt reports neuropathy in bilateral feet and legs and ankle stiffness. She states that the neuropathy has been present for some time, but is gradually worsening. Arthralgia in left shoulder: Pt states that she has undergone physical therapy for arthralgia in her left shoulder and has continued with her exercises with little relief. However, she notes cutting back on the exercises over the last month cut back some due to the jewelry show. Pt reports associated neck pain as well. Incontinence: Pt reports polyuria and notes that she urinates every two to three hours every night. Review of Systems   Constitutional: Positive for malaise/fatigue. Respiratory: Negative for shortness of breath. Cardiovascular: Negative for leg swelling. Genitourinary: Positive for frequency. Musculoskeletal: Positive for joint pain (left shoulder) and neck pain. Neurological: Positive for dizziness and tingling (bilateral lower extremities). All other systems reviewed and are negative. Physical Exam  Vitals signs and nursing note reviewed. Constitutional:       Appearance: She is well-developed. HENT:      Head: Normocephalic and atraumatic. Right Ear: External ear normal.      Left Ear: External ear normal.      Nose: Nose normal.   Eyes:      Conjunctiva/sclera: Conjunctivae normal.      Pupils: Pupils are equal, round, and reactive to light. Neck:      Musculoskeletal: Normal range of motion and neck supple. Cardiovascular:      Rate and Rhythm: Normal rate and regular rhythm. Heart sounds: Murmur present.    Pulmonary:      Effort: Pulmonary effort is normal. Breath sounds: Normal breath sounds. Chest:      Breasts: Breasts are symmetrical.         Right: No inverted nipple, mass, nipple discharge, skin change or tenderness. Left: No inverted nipple, mass, nipple discharge, skin change or tenderness. Abdominal:      General: Bowel sounds are normal.      Palpations: Abdomen is soft. Genitourinary:     Vagina: Normal.      Rectum: Normal. Guaiac result negative. Normal anal tone. Musculoskeletal: Normal range of motion. Skin:     General: Skin is warm and dry. Neurological:      Mental Status: She is alert and oriented to person, place, and time. Psychiatric:         Behavior: Behavior normal.         Thought Content: Thought content normal.         Judgment: Judgment normal.         ASSESSMENT and PLAN  Diagnoses and all orders for this visit:    1. Essential hypertension  BP is at goal. Pt was recommended to switch back to taking Losartan-HCTZ in the morning instead of at night due to polyuria throughout the night, which could be worsening her baseline incontinence and contributing to her sleep disorder. Suggested to take one tablet this afternoon, then switch to taking it in the morning tomorrow. Pt DC Amlodipine.  -     CBC W/O DIFF; Future  -     METABOLIC PANEL, COMPREHENSIVE; Future    2. Acquired hypothyroidism  Thyroid stable with [place] Thyroid. I do not recommend a change in medications. -     T4, FREE; Future  -     TSH 3RD GENERATION; Future    3. Dysthymia  Stable and well-managed with Zoloft. No change in medications. 4. Lightheaded  Prescribed Neurontin. Stable and well-managed. Explained that lightheadedness could be due to lack of sleep and subsequently prescribed Gabapentin to hopefully mitigate neuropathy, sleep disorder, and lightheadedness. 5. Arthralgia, unspecified joint  Stable and well-managed.  Explained that left shoulder pain could have been exacerbated by her \"hunching over\" to work on her jewelry pieces for the show.    6. Sleep disorder  Prescribed Neurontin. Explained that increased stress and neuropathy could be contributing to her sleep disorder. Prescribed Gabapentin to hopefully mitigate neuropathy, sleep disorder, and lightheadedness.  -     gabapentin (NEURONTIN) 300 mg capsule; Can do 1-2 tabs at night    7. Neuropathy  Prescribed Gabapentin and explained that could help with neuropathy and sleep disorder. Suggested to see neurologist if medications do not improve symptoms.  -     gabapentin (NEURONTIN) 300 mg capsule; Can do 1-2 tabs at night    8. Other urinary incontinence  Explained that Detrol could help with her symptoms, although in terms of managing side effects, pt was recommended to only start one new medication at a time. Pt decided to start on Gabapentin only and hold off on Detrol until her follow-up in January. She may also want to see a urologist at that time, as she requested trying another bladder lift, although it only worked for two months last time. 9. B12 deficiency  Stable and well-managed. No change in vitamin B supplements. Will continue to monitor for improvements or changes. -     VITAMIN B12; Future    10. Heart murmur  Slight heart murmur detected on physical exam. Explained that it could be due to dehydration. Ordered ultrasound for monitoring purposes. Waiting on ECHO results. Other orders  -     ECHO ADULT COMPLETE; Future    Additional comments: Pt advised to make an appt in Jan to f/u. Lab results and schedule of future lab studies reviewed with patient. Reviewed diet, exercise and weight control. Written by Nafisa Castillo, as dictated by Yesica Buckley MD.     Current diagnosis and concerns discussed with pt at length. Understands risks and benefits or current treatment plan and medications and accepts the treatment and medication with any possible risks. Pt asks appropriate questions which were answered.  Pt instructed to call with any concerns or problems. This note will not be viewable in [address]

## 2019-11-29 ENCOUNTER — TELEPHONE (OUTPATIENT)
Dept: INTERNAL MEDICINE CLINIC | Age: 84
End: 2019-11-29

## 2019-11-29 DIAGNOSIS — G62.9 NEUROPATHY: Primary | ICD-10-CM

## 2019-11-29 DIAGNOSIS — E03.9 ACQUIRED HYPOTHYROIDISM: ICD-10-CM

## 2019-11-29 NOTE — TELEPHONE ENCOUNTER
----- Message from Cailin Pete MD sent at 11/29/2019 10:02 AM EST -----  Can you call her and let her know her thyroid is a little off? I wanted to add a little levothyroxine to her [place] thyroid as I am concerned she is not getting enough- is she willing to add a low dose?  I can send it in and we can recheck again in 2 months

## 2019-11-29 NOTE — PROGRESS NOTES
Can you call her and let her know her thyroid is a little off? I wanted to add a little levothyroxine to her [place] thyroid as I am concerned she is not getting enough- is she willing to add a low dose?  I can send it in and we can recheck again in 2 months

## 2019-12-02 ENCOUNTER — HOSPITAL ENCOUNTER (OUTPATIENT)
Dept: NON INVASIVE DIAGNOSTICS | Age: 84
Discharge: HOME | End: 2019-12-02
Attending: INTERNAL MEDICINE
Payer: MEDICARE

## 2019-12-02 VITALS
WEIGHT: 160.94 LBS | DIASTOLIC BLOOD PRESSURE: 66 MMHG | SYSTOLIC BLOOD PRESSURE: 146 MMHG | BODY MASS INDEX: 26.81 KG/M2 | HEIGHT: 65 IN

## 2019-12-02 DIAGNOSIS — I10 ESSENTIAL HYPERTENSION: ICD-10-CM

## 2019-12-02 DIAGNOSIS — R01.1 HEART MURMUR: ICD-10-CM

## 2019-12-02 LAB
AV VELOCITY RATIO: 0.81
ECHO AO ASC DIAM: 2.88 CM
ECHO AO ROOT DIAM: 3.11 CM
ECHO AV AREA PEAK VELOCITY: 2.9 CM2
ECHO AV AREA/BSA PEAK VELOCITY: 1.6 CM2/M2
ECHO AV PEAK GRADIENT: 11.3 MMHG
ECHO AV PEAK VELOCITY: 167.78 CM/S
ECHO IVC SNIFF: 1.78 CM
ECHO LA AREA 4C: 16.2 CM2
ECHO LA MAJOR AXIS: 3.17 CM
ECHO LA TO AORTIC ROOT RATIO: 1.02
ECHO LA VOL 2C: 35.1 ML (ref 22–52)
ECHO LA VOL 4C: 38.52 ML (ref 22–52)
ECHO LA VOL BP: 37.8 ML (ref 22–52)
ECHO LA VOL/BSA BIPLANE: 20.96 ML/M2 (ref 16–28)
ECHO LA VOLUME INDEX A2C: 19.46 ML/M2 (ref 16–28)
ECHO LA VOLUME INDEX A4C: 21.36 ML/M2 (ref 16–28)
ECHO LV E' SEPTAL VELOCITY: 7.5 CENTIMETER/SECOND
ECHO LV EDV TEICHHOLZ: 0.47 ML
ECHO LV ESV TEICHHOLZ: 0.17 ML
ECHO LV INTERNAL DIMENSION DIASTOLIC: 4.18 CM (ref 3.9–5.3)
ECHO LV INTERNAL DIMENSION SYSTOLIC: 2.73 CM
ECHO LV IVSD: 0.88 CM (ref 0.6–0.9)
ECHO LV MASS 2D: 145.5 G (ref 67–162)
ECHO LV MASS INDEX 2D: 80.7 G/M2 (ref 43–95)
ECHO LV POSTERIOR WALL DIASTOLIC: 1.03 CM (ref 0.6–0.9)
ECHO LVOT DIAM: 2.14 CM
ECHO LVOT PEAK GRADIENT: 7.4 MMHG
ECHO LVOT PEAK VELOCITY: 135.75 CM/S
ECHO LVOT SV: 112.1 ML
ECHO LVOT VTI: 31.18 CM
ECHO MV A VELOCITY: 119.6 CM/S
ECHO MV AREA PHT: 2.3 CM2
ECHO MV E DECELERATION TIME (DT): 333.7 MS
ECHO MV E VELOCITY: 95.43 CM/S
ECHO MV E/A RATIO: 0.8
ECHO MV PRESSURE HALF TIME (PHT): 96.8 MS
ECHO PV MAX VELOCITY: 91.54 CM/S
ECHO PV PEAK GRADIENT: 3.4 MMHG
ECHO RV INTERNAL DIMENSION: 3.32 CM
ECHO RV TAPSE: 2.63 CM (ref 1.5–2)
ECHO TV REGURGITANT MAX VELOCITY: 259.82 CM/S
ECHO TV REGURGITANT PEAK GRADIENT: 27 MMHG
LVFS 2D: 34.64 %
LVOT MG: 3.45 MMHG
LVOT MV: 0.85 CM/S
LVSV (TEICH): 27.33 ML
MV DEC SLOPE: 2.86

## 2019-12-02 PROCEDURE — 93306 TTE W/DOPPLER COMPLETE: CPT

## 2019-12-02 RX ORDER — LOSARTAN POTASSIUM AND HYDROCHLOROTHIAZIDE 12.5; 1 MG/1; MG/1
TABLET ORAL
Qty: 30 TAB | Refills: 0 | Status: SHIPPED | OUTPATIENT
Start: 2019-12-02 | End: 2020-12-15 | Stop reason: ALTCHOICE

## 2019-12-03 RX ORDER — GABAPENTIN 100 MG/1
100 CAPSULE ORAL
Qty: 60 CAP | Refills: 1 | Status: SHIPPED | OUTPATIENT
Start: 2019-12-03 | End: 2020-01-02 | Stop reason: ALTCHOICE

## 2019-12-03 RX ORDER — LEVOTHYROXINE SODIUM 50 UG/1
50 TABLET ORAL
Qty: 30 TAB | Refills: 3 | Status: SHIPPED | OUTPATIENT
Start: 2019-12-03 | End: 2020-04-04

## 2019-12-03 NOTE — TELEPHONE ENCOUNTER
Spoke with patient and she is ok with starting levothyroxine for her thyroid. Pt also stated that she is feeling \"loopy\" on the Gabapentin 300mg capsules and I suggested that she try a lower dose. Pt agrees with trying the lower dose.

## 2019-12-06 ENCOUNTER — TELEPHONE (OUTPATIENT)
Dept: INTERNAL MEDICINE CLINIC | Age: 84
End: 2019-12-06

## 2019-12-06 NOTE — TELEPHONE ENCOUNTER
Patient called to report that PCP has prescribed several medications. Patient is requesting that nurse call to advise when to take which medications.      [phone]

## 2019-12-06 NOTE — TELEPHONE ENCOUNTER
----- Message from Walter Escobedo MD sent at 12/5/2019  4:08 PM EST -----  Please let her know her ultrasound of heart looks good!

## 2019-12-06 NOTE — TELEPHONE ENCOUNTER
Spoke with patient and helped her sort through which medications she should be taking and when. Pt states that she thinks she has everything straight now and was thankful for the return call.

## 2019-12-30 ENCOUNTER — HOSPITAL ENCOUNTER (EMERGENCY)
Age: 84
Discharge: HOME | End: 2019-12-31
Attending: EMERGENCY MEDICINE
Payer: MEDICARE

## 2019-12-30 DIAGNOSIS — I10 HYPERTENSION, UNSPECIFIED TYPE: Primary | ICD-10-CM

## 2019-12-30 LAB
BASOPHILS # BLD: 0.1 K/UL (ref 0–0.1)
BASOPHILS NFR BLD: 1 % (ref 0–1)
COMMENT, HOLDF: NORMAL
DIFFERENTIAL METHOD BLD: NORMAL
EOSINOPHIL # BLD: 0.1 K/UL (ref 0–0.4)
EOSINOPHIL NFR BLD: 1 % (ref 0–7)
ERYTHROCYTE [DISTWIDTH] IN BLOOD BY AUTOMATED COUNT: 11.8 % (ref 11.5–14.5)
HCT VFR BLD AUTO: 40.6 % (ref 35–47)
HGB BLD-MCNC: 13.5 G/DL (ref 11.5–16)
IMM GRANULOCYTES # BLD AUTO: 0 K/UL (ref 0–0.04)
IMM GRANULOCYTES NFR BLD AUTO: 0 % (ref 0–0.5)
LYMPHOCYTES # BLD: 2.3 K/UL (ref 0.8–3.5)
LYMPHOCYTES NFR BLD: 22 % (ref 12–49)
MCH RBC QN AUTO: 31.3 PG (ref 26–34)
MCHC RBC AUTO-ENTMCNC: 33.3 G/DL (ref 30–36.5)
MCV RBC AUTO: 94.2 FL (ref 80–99)
MONOCYTES # BLD: 0.9 K/UL (ref 0–1)
MONOCYTES NFR BLD: 9 % (ref 5–13)
NEUTS SEG # BLD: 7.1 K/UL (ref 1.8–8)
NEUTS SEG NFR BLD: 67 % (ref 32–75)
NRBC # BLD: 0 K/UL (ref 0–0.01)
NRBC BLD-RTO: 0 PER 100 WBC
PLATELET # BLD AUTO: 189 K/UL (ref 150–400)
PMV BLD AUTO: 12 FL (ref 8.9–12.9)
RBC # BLD AUTO: 4.31 M/UL (ref 3.8–5.2)
SAMPLES BEING HELD,HOLD: NORMAL
WBC # BLD AUTO: 10.5 K/UL (ref 3.6–11)

## 2019-12-30 PROCEDURE — 36415 COLL VENOUS BLD VENIPUNCTURE: CPT

## 2019-12-30 PROCEDURE — 93005 ELECTROCARDIOGRAM TRACING: CPT

## 2019-12-30 PROCEDURE — 84484 ASSAY OF TROPONIN QUANT: CPT

## 2019-12-30 PROCEDURE — 80053 COMPREHEN METABOLIC PANEL: CPT

## 2019-12-30 PROCEDURE — 99285 EMERGENCY DEPT VISIT HI MDM: CPT

## 2019-12-30 PROCEDURE — 85025 COMPLETE CBC W/AUTO DIFF WBC: CPT

## 2019-12-31 ENCOUNTER — TELEPHONE (OUTPATIENT)
Dept: INTERNAL MEDICINE CLINIC | Age: 84
End: 2019-12-31

## 2019-12-31 VITALS
OXYGEN SATURATION: 97 % | SYSTOLIC BLOOD PRESSURE: 185 MMHG | WEIGHT: 159 LBS | BODY MASS INDEX: 25.55 KG/M2 | DIASTOLIC BLOOD PRESSURE: 49 MMHG | HEART RATE: 58 BPM | RESPIRATION RATE: 17 BRPM | HEIGHT: 66 IN | TEMPERATURE: 97.8 F

## 2019-12-31 LAB
ALBUMIN SERPL-MCNC: 3.5 G/DL (ref 3.5–5)
ALBUMIN/GLOB SERPL: 1 {RATIO} (ref 1.1–2.2)
ALP SERPL-CCNC: 72 U/L (ref 45–117)
ALT SERPL-CCNC: 26 U/L (ref 12–78)
ANION GAP SERPL CALC-SCNC: 4 MMOL/L (ref 5–15)
AST SERPL-CCNC: 14 U/L (ref 15–37)
ATRIAL RATE: 63 BPM
BILIRUB SERPL-MCNC: 0.3 MG/DL (ref 0.2–1)
BUN SERPL-MCNC: 19 MG/DL (ref 6–20)
BUN/CREAT SERPL: 20 (ref 12–20)
CALCIUM SERPL-MCNC: 9.1 MG/DL (ref 8.5–10.1)
CALCULATED P AXIS, ECG09: 53 DEGREES
CALCULATED R AXIS, ECG10: 20 DEGREES
CALCULATED T AXIS, ECG11: 84 DEGREES
CHLORIDE SERPL-SCNC: 109 MMOL/L (ref 97–108)
CO2 SERPL-SCNC: 28 MMOL/L (ref 21–32)
CREAT SERPL-MCNC: 0.94 MG/DL (ref 0.55–1.02)
DIAGNOSIS, 93000: NORMAL
GLOBULIN SER CALC-MCNC: 3.5 G/DL (ref 2–4)
GLUCOSE SERPL-MCNC: 105 MG/DL (ref 65–100)
P-R INTERVAL, ECG05: 178 MS
POTASSIUM SERPL-SCNC: 3.6 MMOL/L (ref 3.5–5.1)
PROT SERPL-MCNC: 7 G/DL (ref 6.4–8.2)
Q-T INTERVAL, ECG07: 402 MS
QRS DURATION, ECG06: 78 MS
QTC CALCULATION (BEZET), ECG08: 411 MS
SODIUM SERPL-SCNC: 141 MMOL/L (ref 136–145)
TROPONIN I SERPL-MCNC: <0.05 NG/ML
VENTRICULAR RATE, ECG03: 63 BPM

## 2019-12-31 NOTE — ED NOTES
Patient discharged from ED by provider. Discharge instructions reviewed with patient and all questions answered. Patient Ambulatory from ED in [facility].

## 2019-12-31 NOTE — TELEPHONE ENCOUNTER
Spoke with patient and provided f/u appt with Dr. Dameon Sanon 1/2/20 at 2:15pm.  Pt understood and was thankful for the call.

## 2019-12-31 NOTE — ED NOTES
ED EKG interpretation:  Rhythm: normal sinus rhythm; and regular .  Rate (approx.): 63; Axis: normal; P wave: normal; QRS interval: normal ; ST/T wave: normal; interpreted by Dylan Hess MD

## 2019-12-31 NOTE — DISCHARGE INSTRUCTIONS
Patient Education        High Blood Pressure: Care Instructions  Overview    It's normal for blood pressure to go up and down throughout the day. But if it stays up, you have high blood pressure. Another name for high blood pressure is hypertension. Despite what a lot of people think, high blood pressure usually doesn't cause headaches or make you feel dizzy or lightheaded. It usually has no symptoms. But it does increase your risk of stroke, heart attack, and other problems. You and your doctor will talk about your risks of these problems based on your blood pressure. Your doctor will give you a goal for your blood pressure. Your goal will be based on your health and your age. Lifestyle changes, such as eating healthy and being active, are always important to help lower blood pressure. You might also take medicine to reach your blood pressure goal.  Follow-up care is a key part of your treatment and safety. Be sure to make and go to all appointments, and call your doctor if you are having problems. It's also a good idea to know your test results and keep a list of the medicines you take. How can you care for yourself at home? Medical treatment  · If you stop taking your medicine, your blood pressure will go back up. You may take one or more types of medicine to lower your blood pressure. Be safe with medicines. Take your medicine exactly as prescribed. Call your doctor if you think you are having a problem with your medicine. · Talk to your doctor before you start taking aspirin every day. Aspirin can help certain people lower their risk of a heart attack or stroke. But taking aspirin isn't right for everyone, because it can cause serious bleeding. · See your doctor regularly. You may need to see the doctor more often at first or until your blood pressure comes down.   · If you are taking blood pressure medicine, talk to your doctor before you take decongestants or anti-inflammatory medicine, such as ibuprofen. Some of these medicines can raise blood pressure. · Learn how to check your blood pressure at home. Lifestyle changes  · Stay at a healthy weight. This is especially important if you put on weight around the waist. Losing even 10 pounds can help you lower your blood pressure. · If your doctor recommends it, get more exercise. Walking is a good choice. Bit by bit, increase the amount you walk every day. Try for at least 30 minutes on most days of the week. You also may want to swim, bike, or do other activities. · Avoid or limit alcohol. Talk to your doctor about whether you can drink any alcohol. · Try to limit how much sodium you eat to less than 2,300 milligrams (mg) a day. Your doctor may ask you to try to eat less than 1,500 mg a day. · Eat plenty of fruits (such as bananas and oranges), vegetables, legumes, whole grains, and low-fat dairy products. · Lower the amount of saturated fat in your diet. Saturated fat is found in animal products such as milk, cheese, and meat. Limiting these foods may help you lose weight and also lower your risk for heart disease. · Do not smoke. Smoking increases your risk for heart attack and stroke. If you need help quitting, talk to your doctor about stop-smoking programs and medicines. These can increase your chances of quitting for good. When should you call for help? Call  911 anytime you think you may need emergency care. This may mean having symptoms that suggest that your blood pressure is causing a serious heart or blood vessel problem. Your blood pressure may be over 180/120.   For example, call  911 if:    · You have symptoms of a heart attack. These may include:  ? Chest pain or pressure, or a strange feeling in the chest.  ? Sweating. ? Shortness of breath. ? Nausea or vomiting. ? Pain, pressure, or a strange feeling in the back, neck, jaw, or upper belly or in one or both shoulders or arms. ? Lightheadedness or sudden weakness.   ? A fast or irregular heartbeat.     · You have symptoms of a stroke. These may include:  ? Sudden numbness, tingling, weakness, or loss of movement in your face, arm, or leg, especially on only one side of your body. ? Sudden vision changes. ? Sudden trouble speaking. ? Sudden confusion or trouble understanding simple statements. ? Sudden problems with walking or balance. ? A sudden, severe headache that is different from past headaches.     · You have severe back or belly pain.    Do not wait until your blood pressure comes down on its own. Get help right away.   Call your doctor now or seek immediate care if:    · Your blood pressure is much higher than normal (such as 180/120 or higher), but you don't have symptoms.     · You think high blood pressure is causing symptoms, such as:  ? Severe headache.  ? Blurry vision.    Watch closely for changes in your health, and be sure to contact your doctor if:    · Your blood pressure measures higher than your doctor recommends at least 2 times. That means the top number is higher or the bottom number is higher, or both.     · You think you may be having side effects from your blood pressure medicine. Where can you learn more? Go to http://torres-gabrielle.info/. Enter N446 in the search box to learn more about \"High Blood Pressure: Care Instructions. \"  Current as of: April 9, 2019  Content Version: 12.2  © 8212-9571 [employer]. Care instructions adapted under license by [employer] (which disclaims liability or warranty for this information). If you have questions about a medical condition or this instruction, always ask your healthcare professional. [place] [address] any warranty or liability for your use of this information. Patient Education        Learning About High Blood Pressure  What is high blood pressure? Blood pressure is a measure of how hard the blood pushes against the walls of your arteries. It's normal for blood pressure to go up and down throughout the day, but if it stays up, you have high blood pressure. Another name for high blood pressure is hypertension. Two numbers tell you your blood pressure. The first number is the systolic pressure. It shows how hard the blood pushes when your heart is pumping. The second number is the diastolic pressure. It shows how hard the blood pushes between heartbeats, when your heart is relaxed and filling with blood. Your doctor will give you a goal for your blood pressure. Your goal will be based on your health and your age. High blood pressure (hypertension) means that the top number stays high, or the bottom number stays high, or both. High blood pressure increases the risk of stroke, heart attack, and other problems. You and your doctor will talk about your risks of these problems based on your blood pressure. What happens when you have high blood pressure? · Blood flows through your arteries with too much force. Over time, this damages the walls of your arteries. But you can't feel it. High blood pressure usually doesn't cause symptoms. · Fat and calcium start to build up in your arteries. This buildup is called plaque. Plaque makes your arteries narrower and stiffer. Blood can't flow through them as easily. · This lack of good blood flow starts to damage some of the organs in your body. This can lead to problems such as coronary artery disease and heart attack, heart failure, stroke, kidney failure, and eye damage. How can you prevent high blood pressure? · Stay at a healthy weight. · Try to limit how much sodium you eat to less than 2,300 milligrams (mg) a day. If you limit your sodium to 1,500 mg a day, you can lower your blood pressure even more. ? Buy foods that are labeled \"unsalted,\" \"sodium-free,\" or \"low-sodium. \" Foods labeled \"reduced-sodium\" and \"light sodium\" may still have too much sodium. ?  Flavor your food with garlic, lemon juice, onion, vinegar, herbs, and spices instead of salt. Do not use soy sauce, steak sauce, onion salt, garlic salt, mustard, or ketchup on your food. ? Use less salt (or none) when recipes call for it. You can often use half the salt a recipe calls for without losing flavor. · Be physically active. Get at least 30 minutes of exercise on most days of the week. Walking is a good choice. You also may want to do other activities, such as running, swimming, cycling, or playing tennis or team sports. · Limit alcohol to 2 drinks a day for men and 1 drink a day for women. · Eat plenty of fruits, vegetables, and low-fat dairy products. Eat less saturated and total fats. How is high blood pressure treated? · Your doctor will suggest making lifestyle changes to help your heart. For example, your doctor may ask you to eat healthy foods, quit smoking, lose extra weight, and be more active. · If lifestyle changes don't help enough, your doctor may recommend that you take medicine. · When blood pressure is very high, medicines are needed to lower it. Follow-up care is a key part of your treatment and safety. Be sure to make and go to all appointments, and call your doctor if you are having problems. It's also a good idea to know your test results and keep a list of the medicines you take. Where can you learn more? Go to http://torres-gabrielle.info/. Enter P501 in the search box to learn more about \"Learning About High Blood Pressure. \"  Current as of: April 9, 2019  Content Version: 12.2  © 1147-0654 [employer]. Care instructions adapted under license by [employer] (which disclaims liability or warranty for this information). If you have questions about a medical condition or this instruction, always ask your healthcare professional. [address] any warranty or liability for your use of this information.

## 2019-12-31 NOTE — ED PROVIDER NOTES
Julio C Pfeiffer is a 80 y.o. female  who presents by private vehicle to ER with c/o Patient presents with:  Hypertension  Patient presents with complaints of high blood pressure. Patient reports taking her normal medications today however feeling \"off balance\". Patient took her blood pressure at home and it was elevated. Patient denies chest pain or shortness of breath. Patient reports her PCP recently increased one of her blood pressure medications. She specifically denies any fevers, chills, nausea, vomiting, chest pain, shortness of breath, headache, rash, diarrhea, abdominal pain, urinary/bowel changes, sweating or weight loss. PCP: Suzi Wheeler MD   PMHx significant for: Past Medical History:  No date: Depression  No date: Hypercholesterolemia  No date: Thyroid disease      Comment:  hypothyroid   PSHx significant for: Past Surgical History:  No date: HX CATARACT REMOVAL; Bilateral  No date: HX DILATION AND CURETTAGE      Comment:  more than once-can't remember  No date: HX ORTHOPAEDIC; Bilateral      Comment:  sena's neuroma  No date: HX ORTHOPAEDIC; Right      Comment:  Right finger   No date: HX ROTATOR CUFF REPAIR; Right  No date: HX TONSILLECTOMY  No date: HX UROLOGICAL      Comment:  \"bladder lift\"  Social Hx: Tobacco use: Social History    Tobacco Use      Smoking status: Never Smoker      Smokeless tobacco: Never Used  ; EtOH use: The patient states she drinks 0 per week.; Illicit Drug use: Allergies:   -- Demerol (Meperidine) -- Other (comments)    --  Doesn't remember but states reaction was when she             had first child   -- Pcn (Penicillins) -- Hives    There are no other complaints, changes or physical findings at this time.               Past Medical History:   Diagnosis Date    Depression     Hypercholesterolemia     Thyroid disease     hypothyroid       Past Surgical History:   Procedure Laterality Date    HX CATARACT REMOVAL Bilateral     HX DILATION AND CURETTAGE more than once-can't remember    HX ORTHOPAEDIC Bilateral     sena's neuroma    HX ORTHOPAEDIC Right     Right finger     HX ROTATOR CUFF REPAIR Right     HX TONSILLECTOMY      HX UROLOGICAL      \"bladder lift\"         Family History:   Problem Relation Age of Onset    Stroke Mother     Cancer Father         Brain & Lung    Diabetes Sister     Stroke Sister     Diabetes Brother     Diabetes Maternal Grandmother     Cancer Sister     Heart Disease Brother        Social History     Socioeconomic History    Marital status:      Spouse name: Not on file    Number of children: Not on file    Years of education: Not on file    Highest education level: Not on file   Occupational History    Not on file   Social Needs    Financial resource strain: Not on file    Food insecurity:     Worry: Not on file     Inability: Not on file    Transportation needs:     Medical: Not on file     Non-medical: Not on file   Tobacco Use    Smoking status: Never Smoker    Smokeless tobacco: Never Used   Substance and Sexual Activity    Alcohol use: No    Drug use: No    Sexual activity: Never   Lifestyle    Physical activity:     Days per week: Not on file     Minutes per session: Not on file    Stress: Not on file   Relationships    Social connections:     Talks on phone: Not on file     Gets together: Not on file     Attends Nondenominational service: Not on file     Active member of club or organization: Not on file     Attends meetings of clubs or organizations: Not on file     Relationship status: Not on file    Intimate partner violence:     Fear of current or ex partner: Not on file     Emotionally abused: Not on file     Physically abused: Not on file     Forced sexual activity: Not on file   Other Topics Concern    Not on file   Social History Narrative    Not on file         ALLERGIES: Demerol [meperidine] and Pcn [penicillins]    Review of Systems   Constitutional: Negative for activity change, chills and fever. HENT: Negative for congestion, rhinorrhea and sore throat. Respiratory: Negative for shortness of breath. Cardiovascular: Negative for chest pain and leg swelling. Gastrointestinal: Negative for abdominal pain, diarrhea, nausea and vomiting. Genitourinary: Negative for dysuria, vaginal bleeding and vaginal discharge. Musculoskeletal: Negative for arthralgias and myalgias. Neurological: Positive for dizziness. Psychiatric/Behavioral: Negative for confusion. All other systems reviewed and are negative. Vitals:    12/30/19 2155 12/30/19 2307 12/30/19 2308   BP: (!) 205/85 (!) 174/131    Pulse: 73  (!) 56   Resp: 18  16   Temp: 97.9 °F (36.6 °C)     SpO2: 98% 96% 92%   Weight: 72.1 kg (159 lb)     Height: 5' 6\" (1.676 m)              Physical Exam  Constitutional:       Appearance: Normal appearance. She is well-developed. HENT:      Head: Normocephalic and atraumatic. Right Ear: External ear normal.      Left Ear: External ear normal.      Nose: Nose normal.      Mouth/Throat:      Pharynx: No oropharyngeal exudate. Eyes:      General: Lids are normal.         Right eye: No discharge. Left eye: No discharge. Conjunctiva/sclera: Conjunctivae normal.   Neck:      Musculoskeletal: Normal range of motion. Thyroid: No thyromegaly. Trachea: No tracheal deviation. Cardiovascular:      Rate and Rhythm: Normal rate and regular rhythm. Heart sounds: Normal heart sounds. Pulmonary:      Effort: Pulmonary effort is normal.      Breath sounds: Normal breath sounds. Abdominal:      General: Bowel sounds are normal.      Palpations: Abdomen is soft. Musculoskeletal: Normal range of motion. Skin:     General: Skin is warm and dry. Neurological:      Mental Status: She is alert and oriented to person, place, and time. GCS: GCS eye subscore is 4. GCS verbal subscore is 5. GCS motor subscore is 6.       Cranial Nerves: Cranial nerves are intact. Sensory: Sensation is intact. Motor: Motor function is intact. Coordination: Coordination is intact. Psychiatric:         Judgment: Judgment normal.          MDM  Number of Diagnoses or Management Options  Hypertension, unspecified type:   Diagnosis management comments: Assesment/Plan- 80 y.o. Patient presents with:  Hypertension  differential includes: elevated blood pressure, electrolyte abnormality. Labs and imaging reviewed with no acute findings. Patient is well appearing, afebrile and tolerating PO. Blood pressure improved. Recommend PCP follow up. Patient educated on reasons to return to the ED.            Procedures

## 2019-12-31 NOTE — TELEPHONE ENCOUNTER
----- Message from Ranulfo Hay sent at 12/30/2019  9:18 PM EST -----  Regarding: Dr Ronald Gan Message/Vendor Calls    Caller's first and last name:Mark Hendrickson, son      Reason for call: caller is reporting that pt's blood pressure is reading 180-190 over  and has not been feeling well, Son is taking pt to seek medical care.       Callback required yes/no and why:      Best contact number(s):[phone]      Details to clarify the request:      Ranulfo Hay

## 2019-12-31 NOTE — TELEPHONE ENCOUNTER
----- Message from Bronson Ashby sent at 12/31/2019 12:45 PM EST -----  Regarding: Dr. Vanessa Nguyen first and last name: Zeeshan Stevenson  Reason for call:  wanted to report a hospital visit  Callback required yes/no and why: yes  Best contact number(s): [phone]  Details to clarify the request: Pt was in the hospital last night for high blood pressure. The pt said it was over 205 and she has taken medication and it steadily went down from 205 to 185 to this morning at 138. The pt is concerned about what to do next. A transfer to the backline was attempted but the transfer was unsuccessful.

## 2020-01-02 ENCOUNTER — OFFICE VISIT (OUTPATIENT)
Dept: INTERNAL MEDICINE CLINIC | Age: 85
End: 2020-01-02

## 2020-01-02 VITALS
BODY MASS INDEX: 26.23 KG/M2 | SYSTOLIC BLOOD PRESSURE: 108 MMHG | HEIGHT: 66 IN | WEIGHT: 163.2 LBS | DIASTOLIC BLOOD PRESSURE: 54 MMHG | RESPIRATION RATE: 16 BRPM | OXYGEN SATURATION: 96 % | TEMPERATURE: 98.1 F | HEART RATE: 67 BPM

## 2020-01-02 DIAGNOSIS — E03.9 ACQUIRED HYPOTHYROIDISM: ICD-10-CM

## 2020-01-02 DIAGNOSIS — G62.9 NEUROPATHY: ICD-10-CM

## 2020-01-02 DIAGNOSIS — R41.89 COGNITIVE IMPAIRMENT: ICD-10-CM

## 2020-01-02 DIAGNOSIS — F34.1 DYSTHYMIA: ICD-10-CM

## 2020-01-02 DIAGNOSIS — Z00.00 MEDICARE ANNUAL WELLNESS VISIT, SUBSEQUENT: Primary | ICD-10-CM

## 2020-01-02 DIAGNOSIS — I10 ESSENTIAL HYPERTENSION: ICD-10-CM

## 2020-01-02 DIAGNOSIS — E78.5 DYSLIPIDEMIA: ICD-10-CM

## 2020-01-02 RX ORDER — HYDROCHLOROTHIAZIDE 12.5 MG/1
12.5 TABLET ORAL DAILY
Qty: 90 TAB | Refills: 1 | Status: SHIPPED | OUTPATIENT
Start: 2020-01-02 | End: 2020-07-06

## 2020-01-02 RX ORDER — SERTRALINE HYDROCHLORIDE 50 MG/1
50 TABLET, FILM COATED ORAL DAILY
Qty: 90 TAB | Refills: 1 | Status: SHIPPED | OUTPATIENT
Start: 2020-01-02 | End: 2020-06-30 | Stop reason: ALTCHOICE

## 2020-01-02 RX ORDER — LOSARTAN POTASSIUM 100 MG/1
100 TABLET ORAL DAILY
Qty: 90 TAB | Refills: 1 | Status: SHIPPED | OUTPATIENT
Start: 2020-01-02 | End: 2020-07-06

## 2020-01-02 RX ORDER — AMLODIPINE BESYLATE 2.5 MG/1
2.5 TABLET ORAL
Qty: 30 TAB | Refills: 5 | Status: SHIPPED | OUTPATIENT
Start: 2020-01-02 | End: 2020-12-09

## 2020-01-02 NOTE — PROGRESS NOTES
This is the Subsequent Medicare Annual Wellness Exam, performed 12 months or more after the Initial AWV or the last Subsequent AWV    I have reviewed the patient's medical history in detail and updated the computerized patient record. History     Patient Active Problem List   Diagnosis Code    Essential hypertension I10    Dysthymia F34.1    Dyslipidemia E78.5    Acquired hypothyroidism E03.9    Acute cystitis N30.00    Atherosclerosis of aorta ([facility]) I70.0    Complete tear of rotator cuff M75.120    Female stress incontinence N39.3    GERD (gastroesophageal reflux disease) K21.9    Hx of colonic polyp Z86.010    Hx of shoulder surgery Z98.890    Hx of syncope Z87.898    Hypercholesterolemia E78.00    Astigmatism H52.209    Impaired fasting glucose R73.01    Incomplete bladder emptying R33.9    Knee pain M25.569    Lumbar spondylosis M47.816    Morbid (severe) obesity due to excess calories ([facility]) E66.01    Overactive bladder N32.81    Peripheral neuropathy G62.9    Personal history of other malignant neoplasm of skin Z85.828    Presbyopia H52.4    Recurrent major depression in complete remission ([facility]) F33.42    Osteoporosis M81.0    Supraventricular tachycardia, paroxysmal ([facility]) I47.1    Urge incontinence N39.41    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    Vitamin D deficiency E55.9     Past Medical History:   Diagnosis Date    Depression     Hypercholesterolemia     Thyroid disease     hypothyroid      Past Surgical History:   Procedure Laterality Date    HX CATARACT REMOVAL Bilateral     HX DILATION AND CURETTAGE      more than once-can't remember    HX ORTHOPAEDIC Bilateral     sena's neuroma    HX ORTHOPAEDIC Right     Right finger     HX ROTATOR CUFF REPAIR Right     HX TONSILLECTOMY      HX UROLOGICAL      \"bladder lift\"     Current Outpatient Medications   Medication Sig Dispense Refill    amLODIPine (NORVASC) 2.5 mg tablet Take 1 Tab by mouth nightly.  30 Tab 5  gabapentin (NEURONTIN) 100 mg capsule Take 1 Cap by mouth nightly. Max Daily Amount: 100 mg. May take up to two nightly 60 Cap 1    levothyroxine (SYNTHROID) 50 mcg tablet Take 1 Tab by mouth Daily (before breakfast). 30 Tab 3    losartan-hydroCHLOROthiazide (HYZAAR) 100-12.5 mg per tablet TAKE ONE TABLET BY MOUTH DAILY 30 Tab 0    sertraline (ZOLOFT) 50 mg tablet TAKE ONE TABLET BY MOUTH EVERY DAY 90 Tab 1    astragalus root (ASTRAGALUS PO) Take  by mouth.  cholecalciferol (VITAMIN D3) 1,000 unit cap Take  by mouth daily.  KRILL OIL PO Take 1 Cap by mouth daily.  cyanocobalamin, vitamin B-12, 2,500 mcg tab Take  by mouth.  co-enzyme Q-10 (CO Q-10) 100 mg capsule Take 100 mg by mouth daily.  melatonin 3 mg tablet Take 3 mg by mouth nightly. Allergies   Allergen Reactions    Demerol [Meperidine] Other (comments)     Doesn't remember but states reaction was when she had first child    Pcn [Penicillins] Hives       Family History   Problem Relation Age of Onset    Stroke Mother     Cancer Father         Brain & Lung    Diabetes Sister     Stroke Sister     Diabetes Brother     Diabetes Maternal Grandmother     Cancer Sister     Heart Disease Brother      Social History     Tobacco Use    Smoking status: Never Smoker    Smokeless tobacco: Never Used   Substance Use Topics    Alcohol use: No       Depression Risk Factor Screening:     3 most recent PHQ Screens 4/12/2019   Little interest or pleasure in doing things Not at all   Feeling down, depressed, irritable, or hopeless Not at all   Total Score PHQ 2 0       Alcohol Risk Factor Screening:   Do you average 1 drink per night or more than 7 drinks a week:  No    On any one occasion in the past three months have you have had more than 3 drinks containing alcohol:  No      Functional Ability and Level of Safety:   Hearing: Hearing is good. Activities of Daily Living: The home contains: no safety equipment.   Patient does total self care    Ambulation: with mild difficulty    Fall Risk:  Fall Risk Assessment, last 12 mths 1/2/2020   Able to walk? Yes   Fall in past 12 months? No   Fall with injury? -   Number of falls in past 12 months -   Fall Risk Score -       Abuse Screen:  Patient is not abused    Cognitive Screening   Has your family/caregiver stated any concerns about your memory: yes - some mild cog impairment   Cognitive Screening: Abnormal - Mini Cog Test    Patient Care Team   Patient Care Team:  Sherwin Mccarthy MD as PCP - General (Internal Medicine)  Sherwin Mccarthy MD as PCP - [facility] Empaneled Provider    Assessment/Plan   Education and counseling provided:  Are appropriate based on today's review and evaluation    Diagnoses and all orders for this visit:    1. Essential hypertension  -     amLODIPine (NORVASC) 2.5 mg tablet; Take 1 Tab by mouth nightly. 2. Dysthymia-cont sertraline     3. Dyslipidemia-stable on current dose     4. Neuropathy  -     REFERRAL TO NEUROLOGY    5. Acquired hypothyroidism-stable dose    6. Cognitive impairment- refer to       Health Maintenance Due   Topic Date Due    Bone Densitometry (Dexa) Screening  09/24/1998    Shingrix Vaccine Age 50> (2 of 2) 06/28/2018    MEDICARE YEARLY EXAM  01/29/2020     This note will not be viewable in [facility].

## 2020-01-02 NOTE — PATIENT INSTRUCTIONS
Medicare Wellness Visit, Female     The best way to live healthy is to have a lifestyle where you eat a well-balanced diet, exercise regularly, limit alcohol use, and quit all forms of tobacco/nicotine, if applicable. Regular preventive services are another way to keep healthy. Preventive services (vaccines, screening tests, monitoring & exams) can help personalize your care plan, which helps you manage your own care. Screening tests can find health problems at the earliest stages, when they are easiest to treat. [facility] follows the current, evidence-based guidelines published by the [facility] Terrance Bullock ([facility]) when recommending preventive services for our patients. Because we follow these guidelines, sometimes recommendations change over time as research supports it. (For example, mammograms used to be recommended annually. Even though Medicare will still pay for an annual mammogram, the newer guidelines recommend a mammogram every two years for women of average risk). Of course, you and your doctor may decide to screen more often for some diseases, based on your risk and your co-morbidities (chronic disease you are already diagnosed with). Preventive services for you include:  - Medicare offers their members a free annual wellness visit, which is time for you and your primary care provider to discuss and plan for your preventive service needs. Take advantage of this benefit every year!  -All adults over the age of 72 should receive the recommended pneumonia vaccines. Current USPSTF guidelines recommend a series of two vaccines for the best pneumonia protection.   -All adults should have a flu vaccine yearly and a tetanus vaccine every 10 years.   -All adults age 48 and older should receive the shingles vaccines (series of two vaccines).       -All adults age 38-68 who are overweight should have a diabetes screening test once every three years.   -All adults born between [birth year] and 1965 should be screened once for Hepatitis C.  -Other screening tests and preventive services for persons with diabetes include: an eye exam to screen for diabetic retinopathy, a kidney function test, a foot exam, and stricter control over your cholesterol.   -Cardiovascular screening for adults with routine risk involves an electrocardiogram (ECG) at intervals determined by your doctor.   -Colorectal cancer screenings should be done for adults age 54-65 with no increased risk factors for colorectal cancer. There are a number of acceptable methods of screening for this type of cancer. Each test has its own benefits and drawbacks. Discuss with your doctor what is most appropriate for you during your annual wellness visit. The different tests include: colonoscopy (considered the best screening method), a fecal occult blood test, a fecal DNA test, and sigmoidoscopy.    -A bone mass density test is recommended when a woman turns 65 to screen for osteoporosis. This test is only recommended one time, as a screening. Some providers will use this same test as a disease monitoring tool if you already have osteoporosis. -Breast cancer screenings are recommended every other year for women of normal risk, age 54-69.  -Cervical cancer screenings for women over age 72 are only recommended with certain risk factors.      Here is a list of your current Health Maintenance items (your personalized list of preventive services) with a due date:  Health Maintenance Due   Topic Date Due    Bone Mineral Density   09/24/1998    Shingles Vaccine (2 of 2) 06/28/2018    Annual Well Visit  01/29/2020

## 2020-01-02 NOTE — PROGRESS NOTES
HISTORY OF PRESENT ILLNESS  Ata Meyer is a 80 y.o. female. HPI Presents with daughter    ED Summary: Pt presented at ED on 12/30/19 c/o hypertension despite taking BP meds. Her admitting BP was 205/85. Later her BP decreased to 174/131. Pt had a normal EKG. She was discharged on 12/31 with HTN care pamphlet. Hypertension ROS: taking medications as instructed, no medication side effects noted, no TIA's, no chest pain on exertion, no dyspnea on exertion, no swelling of ankles. New concerns: BP in office today is 108/54. Continues on Losartan-HCTZ 100-12.5 mg. She notes that she felt \"off\" and checked her BP at home (028 systolic). She started to get anxious- and presented at the ED. Denies increased salt intake or pain. She notes her BP has been in the 140's recently. hypothyroidism. Lab Results   Component Value Date/Time    TSH 0.14 (L) 11/25/2019 11:51 AM     Thyroid ROS: denies fatigue, weight changes, heat/cold intolerance, bowel/skin changes or CVS symptoms. Continues on low dose of Synthroid. She notes increase in energy since complying with Synthroid. She has DC [place] Thyroid. Hyperlipidemia:  Cardiovascular risk analysis - 80 y.o. female LDL goal is under 130. ROS: taking medications as instructed, no medication side effects noted, no TIA's, no chest pain on exertion, no dyspnea on exertion, no swelling of ankles. Tolerating meds, no myalgias or other side effects noted  New concerns: Pt's last LDL was 144 on 5/20/19. R foot  numbness: Pt's daughter notes that the pt has been more loopy and started falling. Since decreasing her Neurontin- there has been improvement in her \"loopy\" feeling. She notes occasional \"loopy\" feeling in her foot- and caused her to almost fall again. She notes short-term improvement in her pain. Memory: Pt's daughter notes that her mother's memory has been worsening over the last year. She notes that Neurontin has worsened her memory.  She does not want her mother to take it. Mood: Stable, pt continues to comply with Zoloft 50 mg. Pt notes incontinence and inquires about meds. Review of Systems   Genitourinary: Positive for urgency (incontinence). Neurological: Positive for sensory change (numbness in R foot continues). All other systems reviewed and are negative. Physical Exam  Vitals signs and nursing note reviewed. Constitutional:       Appearance: She is well-developed. HENT:      Head: Normocephalic and atraumatic. Right Ear: External ear normal.      Left Ear: External ear normal.      Nose: Nose normal.   Eyes:      Conjunctiva/sclera: Conjunctivae normal.      Pupils: Pupils are equal, round, and reactive to light. Neck:      Musculoskeletal: Normal range of motion and neck supple. Cardiovascular:      Rate and Rhythm: Normal rate and regular rhythm. Heart sounds: Normal heart sounds. Pulmonary:      Effort: Pulmonary effort is normal.      Breath sounds: Normal breath sounds. Chest:      Breasts: Breasts are symmetrical.         Right: No inverted nipple, mass, nipple discharge, skin change or tenderness. Left: No inverted nipple, mass, nipple discharge, skin change or tenderness. Abdominal:      General: Bowel sounds are normal.      Palpations: Abdomen is soft. Genitourinary:     Vagina: Normal.      Rectum: Normal. Guaiac result negative. Normal anal tone. Musculoskeletal: Normal range of motion. Skin:     General: Skin is warm and dry. Neurological:      Mental Status: She is alert and oriented to person, place, and time. Psychiatric:         Behavior: Behavior normal.         Thought Content: Thought content normal.         Judgment: Judgment normal.         ASSESSMENT and PLAN  Diagnoses and all orders for this visit:    1. Medicare annual wellness visit, subsequent    2.  Essential hypertension  Discussed with pt that since her BP is often elevated- she needs to comply with her Amlodipine 2.5 mg UID to help stabilize her BP. Continues on Losartan 100 mg and HCTZ 12.5 mg UID (currently  due to being on back order). Will continue to monitor for improvements or changes. -     amLODIPine (NORVASC) 2.5 mg tablet; Take 1 Tab by mouth nightly. 3. Dysthymia  Stable and well-managed with Zoloft 50 mg. No change in medications. 4. Dyslipidemia  Stable, patient is tolerating medications, no myalgias. I do not recommend any change in medications. 5. Neuropathy  Discussed with pt that once nerves get damaged and inflamed it is very hear to treat the pain. Informed pt that she will likely need to start using a cane or walker since she is experiencing increased intermittent numbness. Advised pt to see a neurologist (Dr. More Amor) to get nerve conduction tests to confirm or deny neuropathy. Pt's daughter would not like her mother to take it because she thinks her memory has worsened since being on Neurontin. Advised pt to DC Neurontin since her memory is being effected. -     REFERRAL TO NEUROLOGY    6. Acquired hypothyroidism  Thyroid stable with Synthroid 50 mg. I do not recommend a change in medications. 7. Cognitive impairment  Advised pt to discuss her cognitive impairments (memory) with a neurologist (Dr. More Amor). Will continue to monitor for improvements or changes. Other orders  -     sertraline (ZOLOFT) 50 mg tablet; Take 1 Tab by mouth daily. -     losartan (COZAAR) 100 mg tablet; Take 1 Tab by mouth daily. -     hydroCHLOROthiazide (HYDRODIURIL) 12.5 mg tablet; Take 1 Tab by mouth daily. Incontinence: Discussed with pt that she should focus on her current neuropathy, before considering another surgery (Dr. Fawad Pillai) that she will need to be put under for. Advised pt to see Dr. Fawad Pillai after she see's a neurologist.     Lab results and schedule of future lab studies reviewed with patient. Reviewed diet, exercise and weight control.     Written by China Poole, as dictated by Amol Baron MD.     Current diagnosis and concerns discussed with pt at length. Understands risks and benefits or current treatment plan and medications and accepts the treatment and medication with any possible risks. Pt asks appropriate questions which were answered. Pt instructed to call with any concerns or problems. This note will not be viewable in [address]

## 2020-01-13 NOTE — TELEPHONE ENCOUNTER
"----- Message from Esthela Davison sent at 1/13/2020 11:01 AM CST -----  Contact: CAITLIN Chung   Consult/Advisory:    Name Of Caller: Roc Lai Jr.   Provider Name: Wilbert Perez MD  Does patient feel the need to be seen today? No   Relationship to the Pt?: self   Contact Preference?:[phone]   What is the nature of the call?:    - pt has ct scan scheduled for tomorrow but he just a clinic   and advised that he has the flu. Pt will need to reschedule   the scan or has additional questions about if he could still have   it. Please call and advise    Additional Notes:  "Thank you for all that you do for our patients'"      " Received and returned a page at 9:24pm 12/30. Spoke to pt's son Venus Cameron. Reports that his mother has not been feeling well and reports some worsening shortness of breath. Says that she took her blood pressure and it was 180s to 190s over 100. Son was planning on taking her to the emergency room. I agreed with the plan given her symptoms.

## 2020-02-25 ENCOUNTER — OFFICE VISIT (OUTPATIENT)
Dept: NEUROLOGY | Age: 85
End: 2020-02-25

## 2020-02-25 VITALS
OXYGEN SATURATION: 98 % | HEIGHT: 66 IN | HEART RATE: 76 BPM | WEIGHT: 163.14 LBS | RESPIRATION RATE: 16 BRPM | SYSTOLIC BLOOD PRESSURE: 108 MMHG | DIASTOLIC BLOOD PRESSURE: 62 MMHG | BODY MASS INDEX: 26.22 KG/M2

## 2020-02-25 DIAGNOSIS — R41.3 MEMORY LOSS: Primary | ICD-10-CM

## 2020-02-25 DIAGNOSIS — M79.672 PAIN IN BOTH FEET: ICD-10-CM

## 2020-02-25 DIAGNOSIS — M79.671 PAIN IN BOTH FEET: ICD-10-CM

## 2020-02-25 DIAGNOSIS — R20.0 NUMBNESS IN FEET: ICD-10-CM

## 2020-02-25 NOTE — PROGRESS NOTES
Chief Complaint   Patient presents with    New Patient    Foot Pain     with numbness/cold sensation in legs over the past yr or so     Will get a shooting pain in foot at times that \"grabs me\" and will catch. Has caused near falls at times. Started gabapentin 200 mg at [facility] around Nov.  Stated it helped for a few days but then did not help, felt woozy in the mornings so she was taken off of it. Legs will feel numb and/or cold at night which keeps her awake at times.   They are not cold to touch but feel cold inside

## 2020-02-25 NOTE — PATIENT INSTRUCTIONS
RESULT POLICY      If we have ordered testing for you, know that; \"NO NEWS IS GOOD NEWS! \"     It is our policy that we know longer call patients with results, nor do we  give test results over the phone. We schedule follow up appointments so that your results can be discussed in person. This allows you to address any questions you have regarding the results. If you choose to go to an imaging center outside of General acute hospital, it is your responsibility to bring imaging report and disc to follow up appointment. If something of concern is revealed on your test, we will contact you to discuss the matter and if needed schedule a sooner follow up appointment. Additionally, results may be found by using the My Chart feature and one of our patient service representatives at the  can give you instructions on how to access this feature to utilize our electronic medical record system. Thank you for your understanding. [facility]   Statement to Patients  April 1, 2014      In an effort to ensure the large volume of patient prescription refills is processed in the most efficient and expeditious manner, we are asking our patients to assist us by calling your Pharmacy for all prescription refills, this will include also your  Mail Order Pharmacy. The pharmacy will contact our office electronically to continue the refill process. Please do not wait until the last minute to call your pharmacy. We need at least 48 hours (2days) to fill prescriptions. We also encourage you to call your pharmacy before going to  your prescription to make sure it is ready. With regard to controlled substance prescription refill requests (narcotic refills) that need to be picked up at our office, we ask your cooperation by providing us with at least 72 hours (3days) notice that you will need a refill.     We will not refill narcotic prescription refill requests after 4:00pm on any weekday, Monday through Thursday, or after 2:00pm on Fridays, or on the weekends. We encourage everyone to explore another way of getting your prescription refill request processed using [facility], our patient web portal through our electronic medical record system. [facility] is an efficient and effective way to communicate your medication request directly to the office and  downloadable as an rosalva on your smart phone . [facility] also features a review functionality that allows you to view your medication list as well as leave messages for your physician. Are you ready to get connected? If so please review the attatched instructions or speak to any of our staff to get you set up right away! Thank you so much for your cooperation. Should you have any questions please contact our Practice Administrator. How to Get Up Safely After a Fall: Care Instructions  Your Care Instructions    If you have injuries, health problems, or other reasons that may make it easy for you to fall at home, it is a good idea to learn how to get up safely after a fall. Learning how to get up correctly can help you avoid making an injury worse. Also, knowing what to do if you cannot get up can help you stay safe until help arrives. Follow-up care is a key part of your treatment and safety. Be sure to make and go to all appointments, and call your doctor if you are having problems. It's also a good idea to know your test results and keep a list of the medicines you take. How can you care for yourself after a fall? If you think you can get up  First lie still for a few minutes and think about how you feel. If your body feels okay and you think you can get up safely, follow the rest of the steps below:  1. Look for a chair or other piece of furniture that is close to you.   2. Roll onto your side and rest. Roll by turning your head in the direction you want to roll, move your shoulder and arm, then hip and leg in the same direction. 3. Lie still for a moment to let your blood pressure adjust.  4. Slowly push your upper body up, lift your head, and take a moment to rest.  5. Slowly get up on your hands and knees, and crawl to the chair or other stable piece of furniture. 6. Put your hands on the chair. 7. Move one foot forward, and place it flat on the floor. Your other leg should be bent with the knee on the floor. 8. Rise slowly, turn your body, and sit in the chair. Stay seated for a bit and think about how you feel. Call for help. Even if you feel okay, let someone know what happened to you. You might not know that you have a serious injury. If you cannot get up  1. If you think you are injured after a fall or you cannot get up, try not to panic. 2. Call out for help. 3. If you have a phone within reach or you have an emergency call device, use it to call for help. 4. If you do not have a phone within reach, try to slide yourself toward it. If you cannot get to the phone, try to slide toward a door or window or a place where you think you can be heard. 5. [place] or use an object to make noise so someone might hear you. 6. If you can reach something that you can use for a pillow, place it under your head. Try to stay warm by covering yourself with a blanket or clothing while you wait for help. When should you call for help? Call 911 anytime you think you may need emergency care. For example, call if:    · You passed out (lost consciousness).     · You cannot get up after a fall.     · You have severe pain.    Call your doctor now or seek immediate medical care if:    · You have new or worse pain.     · You are dizzy or lightheaded.     · You hit your head.    Watch closely for changes in your health, and be sure to contact your doctor if:    · You do not get better as expected. Where can you learn more? Go to http://hector.info/.   Enter B653 in the search box to learn more about \"How to Get Up Safely After a Fall: Care Instructions. \"  Current as of: November 7, 2018  Content Version: 12.2  © 8065-5386 [facility]. Care instructions adapted under license by [employer] (which disclaims liability or warranty for this information). If you have questions about a medical condition or this instruction, always ask your healthcare professional. [address] any warranty or liability for your use of this information. Preventing Falls: Care Instructions  Your Care Instructions    Getting around your home safely can be a challenge if you have injuries or health problems that make it easy for you to fall. Loose rugs and furniture in walkways are among the dangers for many older people who have problems walking or who have poor eyesight. People who have conditions such as arthritis, osteoporosis, or dementia also have to be careful not to fall. You can make your home safer with a few simple measures. Follow-up care is a key part of your treatment and safety. Be sure to make and go to all appointments, and call your doctor if you are having problems. It's also a good idea to know your test results and keep a list of the medicines you take. How can you care for yourself at home? Taking care of yourself  · You may get dizzy if you do not drink enough water. To prevent dehydration, drink plenty of fluids, enough so that your urine is light yellow or clear like water. Choose water and other caffeine-free clear liquids. If you have kidney, heart, or liver disease and have to limit fluids, talk with your doctor before you increase the amount of fluids you drink. · Exercise regularly to improve your strength, muscle tone, and balance. Walk if you can. Swimming may be a good choice if you cannot walk easily. · Have your vision and hearing checked each year or any time you notice a change.  If you have trouble seeing and hearing, you might not be able to avoid objects and could lose your balance. · Know the side effects of the medicines you take. Ask your doctor or pharmacist whether the medicines you take can affect your balance. Sleeping pills or sedatives can affect your balance. · Limit the amount of alcohol you drink. Alcohol can impair your balance and other senses. · Ask your doctor whether calluses or corns on your feet need to be removed. If you wear loose-fitting shoes because of calluses or corns, you can lose your balance and fall. · Talk to your doctor if you have numbness in your feet. Preventing falls at home  · Remove raised doorway thresholds, throw rugs, and clutter. Repair loose carpet or raised areas in the floor. · Move furniture and electrical cords to keep them out of walking paths. · Use nonskid floor wax, and wipe up spills right away, especially on ceramic tile floors. · If you use a walker or cane, put rubber tips on it. If you use crutches, clean the bottoms of them regularly with an abrasive pad, such as steel wool. · Keep your house well lit, especially [place], and outside walkways. Use night-lights in areas such as hallways and bathrooms. Add extra light switches or use remote switches (such as switches that go on or off when you clap your hands) to make it easier to turn lights on if you have to get up during the night. · Install sturdy handrails on stairways. · Move items in your cabinets so that the things you use a lot are on the lower shelves (about waist level). · Keep a cordless phone and a flashlight with new batteries by your bed. If possible, put a phone in each of the main rooms of your house, or carry a cell phone in case you fall and cannot reach a phone. Or, you can wear a device around your neck or wrist. You push a button that sends a signal for help. · Wear low-heeled shoes that fit well and give your feet good support. Use footwear with nonskid soles.  Check the heels and soles of your shoes for wear. Repair or replace worn heels or soles. · Do not wear socks without shoes on wood floors. · Walk on the grass when the sidewalks are slippery. If you live in an area that gets snow and ice in the winter, sprinkle salt on slippery steps and sidewalks. Preventing falls in the bath  · Install grab bars and nonskid mats inside and outside your shower or tub and near the toilet and sinks. · Use shower chairs and bath benches. · Use a hand-held shower head that will allow you to sit while showering. · Get into a tub or shower by putting the weaker leg in first. Get out of a tub or shower with your strong side first.  · Repair loose toilet seats and consider installing a raised toilet seat to make getting on and off the toilet easier. · Keep your bathroom door unlocked while you are in the shower. Where can you learn more? Go to http://torres-gabrielle.info/. Enter 0476 [id number] 69 [id number] in the search box to learn more about \"Preventing Falls: Care Instructions. \"  Current as of: November 7, 2018  Content Version: 12.2  © 5937-9213 [employer]. Care instructions adapted under license by [employer] (which disclaims liability or warranty for this information). If you have questions about a medical condition or this instruction, always ask your healthcare professional. [place] [address] any warranty or liability for your use of this information.

## 2020-02-25 NOTE — PROGRESS NOTES
[facility] and  [address] at [facility] at [address] [facility], [address] [facility] [address] [facility], [address] [place] [address]  [phone] Office  [phone] Facsimile           Referring: Andrzej Yip MD      Chief Complaint   Patient presents with    New Patient    Foot Pain     with numbness/cold sensation in legs over the past yr or so     27-year-old lady right-handed who comes today accompanied by her daughter for evaluation of what she calls numbness and pain in her feet and legs as well as memory difficulty. Patient says that for a few months at least 6 months when she ambulates down the flores she will have sharp shooting pain in the dorsum of her right foot. Happens in the left foot but less often. Daughter says that she is actually been complaining a lot more like 2 years. What the patient does say is happened over the last several months is that she has started to have a cold feeling in her feet and legs. She notes is bilateral goes up to just below the knees symmetrically. She says that it feels as though her legs are cold on the inside. They do not feel cool to the touch. She also has numbness in her feet. The numbness keeps her from sleeping. No diabetes. No toxic exposures. Never had this before. She took a fall but that was because she was trying to climb into a chair to stand on the chair to get onto a shelf in her leg would not support her pushing up. Second complaint is that of memory difficulty. She says she has terrible difficulty remembering people's names. Her daughter says her short-term memory is poor. Her daughter does her finances and she took that over when the mother lost her  and the daughter was handling the estate. There were no issues with paying bills.   She has no family history of dementia but her father  in his 62s mother  in her 76s. She has no dangerous behaviors. No hallucinations. She has difficulty recalling conversations. She will repeat things. No palpitations. No fever chills. No injury. No visual loss. No focal weakness. Review of office note from Dr Tanner Arreola dated January 2, 2020 this was a Medicare wellness exam.  Diagnoses were essential hypertension for which she was prescribed Norvasc. She was continued on her Zoloft for dysthymia and her dyslipidemia was also stable on a statin. She was referred to secondary to complaints of cognitive impairment as well as dysesthesias in her feet. She was said to have an abnormal mini cog test.    Laboratory analyses from December 30, 2019 with an unremarkable CBC. Metabolic panel unremarkable. Troponin unremarkable. Thyroid function from November 2019 with free T4 0.7 just a touch on the low side. TSH was low as well. Vitamin B12 was normal at 509. Past Medical History:   Diagnosis Date    Depression     Essential hypertension     Hypercholesterolemia     Thyroid disease     hypothyroid    Thyroid disease        Past Surgical History:   Procedure Laterality Date    HX CATARACT REMOVAL Bilateral     HX DILATION AND CURETTAGE      more than once-can't remember    HX ORTHOPAEDIC Bilateral     sena's neuroma    HX ORTHOPAEDIC Right     Right finger     HX ROTATOR CUFF REPAIR Right     HX TONSILLECTOMY      HX UROLOGICAL      \"bladder lift\"       Current Outpatient Medications   Medication Sig Dispense Refill    amLODIPine (NORVASC) 2.5 mg tablet Take 1 Tab by mouth nightly. 30 Tab 5    sertraline (ZOLOFT) 50 mg tablet Take 1 Tab by mouth daily. 90 Tab 1    losartan (COZAAR) 100 mg tablet Take 1 Tab by mouth daily. 90 Tab 1    hydroCHLOROthiazide (HYDRODIURIL) 12.5 mg tablet Take 1 Tab by mouth daily. 90 Tab 1    levothyroxine (SYNTHROID) 50 mcg tablet Take 1 Tab by mouth Daily (before breakfast).  30 Tab 3    losartan-hydroCHLOROthiazide (HYZAAR) 100-12.5 mg per tablet TAKE ONE TABLET BY MOUTH DAILY 30 Tab 0    cholecalciferol (VITAMIN D3) 1,000 unit cap Take  by mouth daily.  KRILL OIL PO Take 1 Cap by mouth daily.  cyanocobalamin, vitamin B-12, 2,500 mcg tab Take  by mouth.  melatonin 3 mg tablet Take 3 mg by mouth nightly.  astragalus root (ASTRAGALUS PO) Take  by mouth.  co-enzyme Q-10 (CO Q-10) 100 mg capsule Take 100 mg by mouth daily. Allergies   Allergen Reactions    Demerol [Meperidine] Other (comments)     Doesn't remember but states reaction was when she had first child    Pcn [Penicillins] Hives       Social History     Tobacco Use    Smoking status: Never Smoker    Smokeless tobacco: Never Used   Substance Use Topics    Alcohol use: No    Drug use: No       Family History   Problem Relation Age of Onset    Stroke Mother     Cancer Father         Brain & Lung    Diabetes Sister     Stroke Sister     Diabetes Brother     Diabetes Maternal Grandmother     Cancer Sister     Heart Disease Brother        Review of Systems  Pertinent positives and negatives as noted with remainder of comprehensive review negative      Examination  Visit Vitals  /62 (BP 1 Location: Left arm, BP Patient Position: Standing)   Pulse 76   Resp 16   Ht 5' 6\" (1.676 m)   Wt 74 kg (163 lb 2.3 oz)   SpO2 98%   BMI 26.33 kg/m²   Pleasant, well appearing. Dress and grooming are appropriate. No scleral icterus is present. Oropharynx is clear. Supple neck without bruit appreciated. Heart regular. Pulses are symmetrical.  No edema in the lower extremities. Neurologically she is awake alert and oriented to the day date and year. Knows the president. Has difficulty recalling the democratic candidates who are running for president. She does name Efrem Antoine but then she has difficulty remembering Darryl Gamez or any of the other people. She does get this with a head. Registration 3/3 recall 2/3 at 5 minutes.   She has no right left confusion. She is fluent. She is unable to navigate a cryptic phrase but then understands the answer when told. Cranial nerves intact 2-12 status post cataract surgery bilaterally. No nystagmus. She does wear hearing aids however. 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 No pronation and no drift. Full strength in the upper and lower extremities in all muscle groups direct testing. Reflexes globally depressed but symmetrical.  No pathologic reflexes elicited. No ataxia. Graded sensory loss to the dorsum of the feet bilaterally. Steady gait. Impression/Plan  Elderly lady with dysesthesia and pain in her lower extremities. Differential diagnosis includes neuropathy large fiber versus small fiber versus other etiology. We will get an EMG of the bilateral lower extremities if this is normal then we will proceed skin biopsy and ANS testing. For her cognitive complaints this is either age-related memory loss versus mild cognitive impairment or very early dementia. In order to sort this we will evaluate for potential anatomic abnormalities in the frontal and temporal lobes of the CT scan. Evaluate for vascular abnormality with carotid Doppler. Evaluate baseline rhythms with an EEG. Formal neuropsychological evaluation. She will follow-up at the conclusion of her testing    Iliana Crews MD    This note was created using voice recognition software. Despite editing, there may be syntax errors. This note will not be viewable in [address]

## 2020-02-26 ENCOUNTER — OFFICE VISIT (OUTPATIENT)
Dept: NEUROLOGY | Age: 85
End: 2020-02-26

## 2020-02-26 DIAGNOSIS — R45.86 MOOD SWINGS: ICD-10-CM

## 2020-02-26 DIAGNOSIS — Z86.59 HISTORY OF DEPRESSION: ICD-10-CM

## 2020-02-26 DIAGNOSIS — R41.3 SHORT-TERM MEMORY LOSS: ICD-10-CM

## 2020-02-26 DIAGNOSIS — G31.84 MILD COGNITIVE IMPAIRMENT: Primary | ICD-10-CM

## 2020-02-26 NOTE — PROGRESS NOTES
[address] [facility],5Th Floor  [address]   [place] [address] [place] [address] [address]   [phone] Office   [phone] Fax      Neuropsychology    Initial Diagnostic Interview Note      Referral:  Missy Rodgers MD ,  Marisol Alvarado is a 80 y.o. right handed  female who was accompanied by her daughter to the initial clinical interview on 20 . Please refer to her medical records for details pertaining to her history. At the start of the appointment, I reviewed the patient's [facility] Epic Chart (including Media scanned in from previous providers) for the active Problem List, all pertinent Past Medical Hx, medications, recent radiologic and laboratory findings. In addition, I reviewed pt's documented Immunization Record and Encounter History. The patient completed one year of college without history of previously diagnosed LD and/or receipt of special education services. She also took additional college courses. 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 She has noticed a progressive memory decline. She forgets the content of conversations. Misplaces things. She has tightness in her head and numbness and tingling in her feet and legs. She did have a fall. Daughter notes significant problem with the patient's ability to remember names. The daughter now does the finances and she took over that when the patient became . She was not having financial problems/difficulties. She stopped driving. Father  in his 62s and mother  in 76s. She was driving but stopped (she was living in [facility], and got turned around once, and talked to spouse, and that was the last time she had driven 2 1/2 years ago). She doesn't plan to buy a new car. She has no fever, no chills.   The patient has had some falls and hit her head without known concussion. No known stroke, meningitis/encephalitis, BE Fever, Lupus, Lyme, TBi, sx. Mother and sister  from complications of stroke. She doesn't think she is smelling things very well at all anymore. She can sometimes smell food if it is cooking. No psychosis. No eloping. She has no focal issues. I reviewed note from Dr. Deisy Hussein in the chart and also note from Dr. Anna Marie Johnson. She has been on Zoloft for dysthymia and on statin. Did poorly on a cognitive screen, and referred here for evaluation. Laboratory analyses from 2019 with an unremarkable CBC. Metabolic panel unremarkable. Troponin unremarkable. Thyroid function from 2019 with free T4 0.7 just a touch on the low side. TSH was low as well. Vitamin B12 was normal at 509. Imaging pending. No counseling or psychiatrist.     She is on diuretic. Incontinent,. Up at least three times at night. Daughter notes particular decline in the past several months. She has been on Zoloft x 30 years.       Neuropsychological Mental Status Exam (NMSE):  Historian: Good  Praxis: No UE apraxia  R/L Orientation: Intact to self and to other  Dress: within normal limits   Weight: overweight (slight)    Appearance/Hygiene: within normal limits   Gait: With cane  Assistive Devices Glasses, cane   Mood: within normal limits   Affect: within normal limits   Comprehension: within normal limits   Thought Process: within normal limits   Expressive Language: Mild word finding  Receptive Language: within normal limits   Motor:  No cognitive or motor perseveration  ETOH: Wine very rarely  Tobacco: Denied  Illicit: Denied  SI/HI: Denied  Psychosis: Denied  Insight: Within normal limits  Judgment: Within normal limits  Other Psych: She describes some hypomanic episodes and had mood swings when came off Zoloft      Past Medical History:   Diagnosis Date    Depression     Essential hypertension     Hypercholesterolemia     Thyroid disease     hypothyroid    Thyroid disease        Past Surgical History:   Procedure Laterality Date    HX CATARACT REMOVAL Bilateral     HX DILATION AND CURETTAGE      more than once-can't remember    HX ORTHOPAEDIC Bilateral     sena's neuroma    HX ORTHOPAEDIC Right     Right finger     HX ROTATOR CUFF REPAIR Right     HX TONSILLECTOMY      HX UROLOGICAL      \"bladder lift\"       Allergies   Allergen Reactions    Demerol [Meperidine] Other (comments)     Doesn't remember but states reaction was when she had first child    Pcn [Penicillins] Hives       Family History   Problem Relation Age of Onset    Stroke Mother     Cancer Father         Brain & Lung    Diabetes Sister     Stroke Sister     Diabetes Brother     Diabetes Maternal Grandmother     Cancer Sister     Heart Disease Brother        Social History     Tobacco Use    Smoking status: Never Smoker    Smokeless tobacco: Never Used   Substance Use Topics    Alcohol use: No    Drug use: No       Current Outpatient Medications   Medication Sig Dispense Refill    amLODIPine (NORVASC) 2.5 mg tablet Take 1 Tab by mouth nightly. 30 Tab 5    sertraline (ZOLOFT) 50 mg tablet Take 1 Tab by mouth daily. 90 Tab 1    losartan (COZAAR) 100 mg tablet Take 1 Tab by mouth daily. 90 Tab 1    hydroCHLOROthiazide (HYDRODIURIL) 12.5 mg tablet Take 1 Tab by mouth daily. 90 Tab 1    levothyroxine (SYNTHROID) 50 mcg tablet Take 1 Tab by mouth Daily (before breakfast). 30 Tab 3    losartan-hydroCHLOROthiazide (HYZAAR) 100-12.5 mg per tablet TAKE ONE TABLET BY MOUTH DAILY 30 Tab 0    astragalus root (ASTRAGALUS PO) Take  by mouth.  cholecalciferol (VITAMIN D3) 1,000 unit cap Take  by mouth daily.  KRILL OIL PO Take 1 Cap by mouth daily.  cyanocobalamin, vitamin B-12, 2,500 mcg tab Take  by mouth.  co-enzyme Q-10 (CO Q-10) 100 mg capsule Take 100 mg by mouth daily.  melatonin 3 mg tablet Take 3 mg by mouth nightly. Plan:  Obtain authorization for testing from insurance company. Report to follow once testing, scoring, and interpretation completed. ? Organic based neurocognitive issues versus mood disorder or combination of same. ? Problems organic, functional, or both? This note will not be viewable in [address]

## 2020-02-27 ENCOUNTER — OFFICE VISIT (OUTPATIENT)
Dept: INTERNAL MEDICINE CLINIC | Age: 85
End: 2020-02-27

## 2020-02-27 VITALS
DIASTOLIC BLOOD PRESSURE: 54 MMHG | RESPIRATION RATE: 16 BRPM | TEMPERATURE: 97.6 F | WEIGHT: 165.6 LBS | SYSTOLIC BLOOD PRESSURE: 109 MMHG | HEIGHT: 66 IN | BODY MASS INDEX: 26.61 KG/M2 | HEART RATE: 71 BPM | OXYGEN SATURATION: 95 %

## 2020-02-27 DIAGNOSIS — I10 ESSENTIAL HYPERTENSION: Primary | ICD-10-CM

## 2020-02-27 DIAGNOSIS — E78.5 DYSLIPIDEMIA: ICD-10-CM

## 2020-02-27 DIAGNOSIS — F34.1 DYSTHYMIA: ICD-10-CM

## 2020-02-27 DIAGNOSIS — R41.89 COGNITIVE IMPAIRMENT: ICD-10-CM

## 2020-02-27 NOTE — LETTER
February 27, 2020 Mariella Pacheco [address] [place] [phone] Dear Rhea Mayfield: Thank you for requesting access to [employer]. Please follow the instructions below to securely access and download your online medical record. [employer]enger allows you to send messages to your doctor, view your test results, renew your prescriptions, schedule appointments, and more. How Do I Sign Up? 1. In your internet browser, go to [email or web address]. 2. Click on the First Time User? Click Here link in the Sign In box. You will see the New Member Sign Up page. 3. Enter your Think [employer]enger Access Code exactly as it appears below. You will not need to use this code after youve completed the sign-up process. If you do not sign up before the expiration date, you must request a new code. [employer]enger Access Code: [email or web address] Expires: 4/10/2020 10:31 AM  
 
4. Enter the last four digits of your Social Security Number (xxxx) and Date of Birth (mm/dd/yyyy) as indicated and click Submit. You will be taken to the next sign-up page. 5. Create a Think Passenger ID. This will be your Think Passenger login ID and cannot be changed, so think of one that is secure and easy to remember. 6. Create a Think Passenger password. You can change your password at any time. 7. Enter your Password Reset Question and Answer. This can be used at a later time if you forget your password. 8. Enter your e-mail address. You will receive e-mail notification when new information is available in [address] 9. Click Sign Up. You can now view and download portions of your medical record. 10. Click the Download Summary menu link to download a portable copy of your medical information. Additional Information If you have questions, please visit the Frequently Asked Questions section of the Think [employer]enger website at [email or web address] Remember, [employer]enger is NOT to be used for urgent needs.  For medical emergencies, dial 911. Now available from your iPhone and Android! Sincerely, [employer]

## 2020-02-27 NOTE — Clinical Note
February 27, 2020 Dear Martha Hughes, We are pleased to provide you with secure, online access to medical information via [employer]Mod[employer] for: 
 
[facility] How Do I Sign Up? 1. In your internet browser, go to [email or web address] 
 
2. Click on the Sign Up Now link in the Sign In box. You will see the New Member Sign Up page. 3. Enter your [employer] Access Code exactly as it appears below. You will not need to use this code after youve completed the sign-up process. If you do not sign up before the expiration date, you must request a new code. [employer] Access Code: [email or web address] Expiration Date: 4/12/2020  2:30 PM  
 
4. In the Social Security Number field, enter your Social Security Number and your Date of Birth (mm/dd/yyyy) and click Submit. You will be taken to the next sign-up page. 5. Create a MetaModix ID. This will be your MetaModix login ID and cannot be changed, so think of one that is secure and easy to remember. 6. Create a [employer] password. You can change your password at any time. 7. Enter your Password Reset Question and Answer. This can be used at a later time if you forget your password. 8. Enter your e-mail address. You will receive e-mail notification when new information is available in [address] 9. Click Sign Up. You can now view the [employer] account of Mariella Pacheco. Additional Information If you have questions, you can call [phone]. Remember, [employer] is NOT to be used for urgent needs. For medical emergencies, dial 911. Now available from your iPhone and Android! Sincerely, Courtney Nayak

## 2020-02-27 NOTE — PROGRESS NOTES
HISTORY OF PRESENT ILLNESS  Alphonse Pace is a 80 y.o. female who presents with oldest daughterDante Johnson. HPI  Hypertension ROS: taking medications as instructed, no medication side effects noted, no TIA's, no chest pain on exertion, no dyspnea on exertion, no swelling of ankles. New concerns: BP in office today is 109/54. BP is sporadic at home. Ranging from 379/93, 039 systolic, 045/12 and 108 systolic all last week. Continues on Losartan 100 mg, HCTZ 12.5 mg, and Amlodipine 2.5 mg. Pt states that she has been feeling lightheaded and dizzy since starting the newer BP medication 2 months ago. She gets increasingly lightheaded when getting up. Her daughter notes that there are times within the last 2-3 months all of a sudden. She is scared of a stroke because her sister and mother had one. Hyperlipidemia:  Cardiovascular risk analysis - 80 y.o. female LDL goal is under 130. ROS: no TIA's, no chest pain on exertion, no dyspnea on exertion, no swelling of ankles. New concerns: Pt's last LDL was 144 on 5/20/19.     hypothyroidism. Lab Results   Component Value Date/Time    TSH 0.14 (L) 11/25/2019 11:51 AM     Thyroid ROS: denies fatigue, weight changes, heat/cold intolerance, bowel/skin changes or CVS symptoms. Continues on Synthroid 50 mcg. Cognitive impairment: Pt's daughter notes that she also noticed a drastic decrease in her memory in the last 2-3 months as well. Dysthymia: Stable, pt continues to comply with Zoloft 50 mg. Review of Systems   Neurological: Positive for dizziness. Psychiatric/Behavioral: Positive for memory loss. All other systems reviewed and are negative. Physical Exam  Constitutional:       Appearance: Normal appearance. HENT:      Right Ear: Hearing, tympanic membrane and external ear normal.      Left Ear: Hearing, tympanic membrane and external ear normal.      Mouth/Throat:      Mouth: Mucous membranes are moist.      Pharynx: Oropharynx is clear. Cardiovascular:      Rate and Rhythm: Normal rate and regular rhythm. Pulmonary:      Effort: Pulmonary effort is normal.      Breath sounds: Normal breath sounds and air entry. Musculoskeletal: Normal range of motion. Skin:     General: Skin is warm and dry. Neurological:      General: No focal deficit present. Mental Status: She is alert and oriented to person, place, and time. Psychiatric:         Mood and Affect: Mood normal.         Behavior: Behavior normal.         ASSESSMENT and PLAN  Diagnoses and all orders for this visit:    1. Essential hypertension  Advised pt to decrease Amlodipine compliance for a few days and see if she feels better then continue with Amlodipine PRN. Discussed with pt that the whoozy feeling maybe a side effect of the BP medication. Reviewed pt's chart in exam room with her. Discussed with pt that saying that she was put on Amlodipine 2.5 mg in April of last year. She called our office in October and her BP was 721'S systolic and she stopped the medication. Explained that if she feels like consistent compliance with amlodipine makes her BP lower and she could comply with the medication PRN if her BP is high. Discussed that it is likely related to the body aging, causing autonomic dysfunction. Discussed that autonomic dysfunction is very difficult to manage. Informed pt that memory can be a component like missing dosages or misunderstanding instructions. Advised pt to f/u in 1 month to reevaluate. 2. Cognitive impairment  Noted worsening of cognitive function, specifically over the last 2-3 months. Advised pt to f/u with Dr. Noemi Saucedo and discuss memory testing. Explained that exercising regularly can help with memory and focus, but she should continue to f/u with her neurologist and discuss this. Advised pt to f/u after her neurologist appt (approx 1 month). Will continue to monitor for improvements or changes.     3. Dysthymia  Stable and well-managed with Zoloft 50 mg. No change in medications. 4. Dyslipidemia  Stable and well-managed with exercise and diet. Will continue to monitor for improvements or changes       Lab results and schedule of future lab studies reviewed with patient. Reviewed diet, exercise and weight control. Written by Alejandro Tellez, as dictated by Alesia Ang MD.     Current diagnosis and concerns discussed with pt at length. Understands risks and benefits or current treatment plan and medications and accepts the treatment and medication with any possible risks. Pt asks appropriate questions which were answered. Pt instructed to call with any concerns or problems. This note will not be viewable in [address]

## 2020-03-04 ENCOUNTER — OFFICE VISIT (OUTPATIENT)
Dept: NEUROLOGY | Age: 85
End: 2020-03-04

## 2020-03-04 ENCOUNTER — HOSPITAL ENCOUNTER (OUTPATIENT)
Dept: CT IMAGING | Age: 85
Discharge: HOME | End: 2020-03-04
Attending: PSYCHIATRY & NEUROLOGY
Payer: MEDICARE

## 2020-03-04 DIAGNOSIS — F32.1 MODERATE MAJOR DEPRESSION (HCC): ICD-10-CM

## 2020-03-04 DIAGNOSIS — R45.86 MOOD SWINGS: ICD-10-CM

## 2020-03-04 DIAGNOSIS — M54.16 LUMBAR RADICULOPATHY: ICD-10-CM

## 2020-03-04 DIAGNOSIS — G31.84 MILD COGNITIVE IMPAIRMENT: Primary | ICD-10-CM

## 2020-03-04 DIAGNOSIS — R41.3 MEMORY LOSS: ICD-10-CM

## 2020-03-04 DIAGNOSIS — F41.9 MODERATE ANXIETY: ICD-10-CM

## 2020-03-04 DIAGNOSIS — R41.0 CONFUSION: Primary | ICD-10-CM

## 2020-03-04 DIAGNOSIS — M79.671 PAIN IN BOTH FEET: ICD-10-CM

## 2020-03-04 DIAGNOSIS — R20.0 NUMBNESS IN FEET: ICD-10-CM

## 2020-03-04 DIAGNOSIS — G60.3 IDIOPATHIC PROGRESSIVE NEUROPATHY: Primary | ICD-10-CM

## 2020-03-04 DIAGNOSIS — M79.672 PAIN IN BOTH FEET: ICD-10-CM

## 2020-03-04 PROCEDURE — 70450 CT HEAD/BRAIN W/O DYE: CPT

## 2020-03-04 NOTE — PROCEDURES
EMG/ NCS Report  DRUG REHABILITATION  - DAY ONE RESIDENCE  [facility]  [facility], [address] [place] [address] [address], [address]   Ph: [phone]   FAX: [phone]/ [phone]  Test Date:  3/4/2020    Patient: Thomas Whitehead : 1933 Physician: Fabien Prem   ID#: [id number] SEX: Female Ref. Phys: Nicho Dominguez MD     Patient History / Exam:  Patient is complaining of feet numbness/tingling. Exam reveals stocking sensory deficit with 0 ankle reflex. Assess for neuropathy vs lumbar radiculopathy. EMG & NCV Findings:  Unable to obtain bilateral superficial peroneal sensory responses. Decrease bilateral sural sensory nerve action potential amplitudes. Motor nerve conduction studies (as indicated in the tables) were within reference of normal.    All F Wave latencies were within normal limits. All F Wave left vs. right side latency differences were within normal limits. All H Reflex left vs. right side latency differences were within normal limits. Disposable concentric needle EMG (as indicated in the following table) revealed mild denervation changes left tibialis anterior muscle and lower lumbar paraspinal muscles. Impressions: This study is abnormal.  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 Recommend lumbar MRI correlate. Thank you for the consult.     Kassandra Pena MD    Nerve Conduction Studies  Anti Sensory Summary Table     Stim Site NR Peak (ms) Norm Peak (ms) P-T Amp (µV) Norm P-T Amp Site1 Site2 Dist (cm)   Left Sup Fibular Anti Sensory (Lat ankle)  26.1°C   Lower leg NR  <4.6  >4 Lower leg Lat ankle 10.0   Site 2 NR          Right Sup Fibular Anti Sensory (Lat ankle)  30.6°C   Lower leg NR  <4.6  >4 Lower leg Lat ankle 10.0   Site 2 NR          Left Sural Anti Sensory (Lat Mall)  24.6°C   Calf    3.5 <4.5 2.5 >4.0 Calf Lat Mall 14.0   Site 2    3.7  2.7       Right Sural Anti Sensory (Lat Mall)  23.5°C   Calf    3.7 <4.5 2.5 >4.0 Calf Lat Mall 14.0   Site 2    3.7  3.7         Motor Summary Table     Stim Site NR Onset (ms) Norm Onset (ms) O-P Amp (mV) Norm O-P Amp Amp (Prev) (%) Site1 Site2 Dist (cm) James (m/s) Norm James (m/s)   Left Fibular Motor (Ext Dig Brev)  25.7°C   Ankle    4.5 <6.5 1.6 >1.1 100.0 Ankle Ext Dig Brev 8.0     B Fib    11.6  1.2  75.0 B Fib Ankle 30.0 42 >38   Poplt    12.9  1.2  100.0 Poplt Ankle 40.0 48 >42   Right Fibular Motor (Ext Dig Brev)  30.6°C   Ankle    3.7 <6.5 1.8 >1.1 100.0 Ankle Ext Dig Brev 8.0     B Fib    10.1  1.4  77.8 B Fib Ankle 29.0 45 >38   Poplt    11.6  1.5  107.1 Poplt Ankle 39.0 49 >42   Left Tibial Motor (Abd Rinaldi Brev)  25°C   Ankle    4.2 <6.1 5.2 >1.1 100.0 Ankle Abd Rinaldi Brev 8.0     Knee    12.7  1.8  34.6 Knee Ankle 34.0 40 >39   Right Tibial Motor (Abd Rinaldi Brev)  30.4°C   Ankle    5.6 <6.1 5.4 >1.1 100.0 Ankle Abd Rinaldi Brev 8.0     Knee    13.8  3.4  63.0 Knee Ankle 37.0 45 >39     F Wave Studies     NR F-Lat (ms) Lat Norm (ms) L-R F-Lat (ms) L-R Lat Norm   Left Tibial (Mrkrs) (Abd Hallucis)  24.4°C      49.26 <56 0.00 <5.7   Right Tibial (Mrkrs) (Abd Hallucis)  30.3°C      49.26 <56 0.00 <5.7     H Reflex Studies     NR H-Lat (ms) L-R H-Lat (ms) L-R Lat Norm   Left Tibial (Gastroc)  24.1°C      34.80 1.55 <2.0   Right Tibial (Gastroc)  30.3°C      36.35 1.55 <2.0       EMG     Side Muscle Nerve Root Ins Act Fibs Psw Recrt Duration Amp Poly Comment   Left VastusLat Femoral L2-4 Nml Nml Nml Nml Nml Nml Nml    Left MedGastroc Tibial S1-2 Nml Nml Nml Nml Nml Nml Nml    Left AntTibialis Dp Br Peron L4-5 Incr 1+ 1+ Reduced Incr Incr 2+    Left Peroneus Long   Nml Nml Nml Nml Nml Nml Nml    Left TensorFascLat SupGluteal L4-5, S1 Nml Nml Nml Nml Nml Nml Nml    Left Lower Lumb Parasp Rami L5,S1 Incr 1+ 1+ Nml Nml Nml Nml    Left Mid Lumb Parasp Rami L4,5 Nml Nml Nml Nml Nml Nml Nml    Right VastusLat Femoral L2-4 Nml Nml Nml Nml Nml Nml Nml    Right MedGastroc Tibial S1-2 Nml Nml Nml Nml Nml Nml Nml    Right AntTibialis Dp Br Peron L4-5 Nml Nml Nml Nml Nml Nml Nml    Right Peroneus Long   Nml Nml Nml Nml Nml Nml Nml    Right TensorFascLat SupGluteal L4-5, S1 Nml Nml Nml Nml Nml Nml Nml    Right Lower Lumb Parasp Rami L5,S1 Nml Nml Nml Nml Nml Nml Nml    Right Mid Lumb Parasp Rami L4,5 Nml Nml Nml Nml Nml Nml Nml      Waveforms:

## 2020-03-04 NOTE — LETTER
3/5/20 Patient: Sravanthi Correia YOB: 1933 Date of Visit: 3/4/2020 Manju Vila MD 
[address] [facility] [address] [facility] [address] VIA In Basket Dear Manju Vila MD, Thank you for referring Ms. Charlie Kearns to [facility] for evaluation. My notes for this consultation are attached. If you have questions, please do not hesitate to call me. I look forward to following your patient along with you. Sincerely, William Albright PsyD

## 2020-03-05 NOTE — PROGRESS NOTES
[address] [facility],[address]  [address]   [place] [address] [facility] [place] [address]   [phone] Office   [phone] Fax      Neuropsychological Evaluation Report    Referral:  Juan Levi MD , Dr. Cheek Dear is a 80 y.o. right handed  female who was accompanied by her daughter to the initial clinical interview on 20 . Please refer to her medical records for details pertaining to her history. At the start of the appointment, I reviewed the patient's [facility] Epic Chart (including Media scanned in from previous providers) for the active Problem List, all pertinent Past Medical Hx, medications, recent radiologic and laboratory findings. In addition, I reviewed pt's documented Immunization Record and Encounter History. The patient completed one year of college without history of previously diagnosed LD and/or receipt of special education services. She also took additional college courses. 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 She has noticed a progressive memory decline. She forgets the content of conversations. Misplaces things. She has tightness in her head and numbness and tingling in her feet and legs. She did have a fall. Daughter notes significant problem with the patient's ability to remember names. The daughter now does the finances and she took over that when the patient became . She was not having financial problems/difficulties. She stopped driving. Father  in his 62s and mother  in 76s. She was driving but stopped (she was living in [facility], and got turned around once, and talked to spouse, and that was the last time she had driven 2 1/2 years ago). She doesn't plan to buy a new car. She has no fever, no chills. The patient has had some falls and hit her head without known concussion.   No known stroke, meningitis/encephalitis, BE Fever, Lupus, Lyme, TBi, sx. Mother and sister  from complications of stroke. She doesn't think she is smelling things very well at all anymore. She can sometimes smell food if it is cooking. No psychosis. No eloping. She has no focal issues. Denied major mood concerns save for dysthymia. .  No psychiatrist.     I reviewed note from Dr. Julito Alas in the chart and also note from Dr. Valencia Rizzo. She has been on Zoloft for dysthymia and on statin. Did poorly on a cognitive screen, and referred here for evaluation. Laboratory analyses from 2019 with an unremarkable CBC. Metabolic panel unremarkable. Troponin unremarkable. Thyroid function from 2019 with free T4 0.7 just a touch on the low side. TSH was low as well. Vitamin B12 was normal at 509. Imaging pending. No counseling or psychiatrist.     She is on diuretic. Incontinent,. Up at least three times at night. Daughter notes particular decline in the past several months. She has been on Zoloft x 30 years.       Neuropsychological Mental Status Exam (NMSE):  Historian: Good  Praxis: No UE apraxia  R/L Orientation: Intact to self and to other  Dress: within normal limits   Weight: overweight (slight)    Appearance/Hygiene: within normal limits   Gait: With cane  Assistive Devices Glasses, cane   Mood: within normal limits   Affect: within normal limits   Comprehension: within normal limits   Thought Process: within normal limits   Expressive Language: Mild word finding  Receptive Language: within normal limits   Motor:  No cognitive or motor perseveration  ETOH: Wine very rarely  Tobacco: Denied  Illicit: Denied  SI/HI: Denied  Psychosis: Denied  Insight: Within normal limits  Judgment: Within normal limits  Other Psych: She describes some hypomanic episodes and had mood swings when came off Zoloft      Past Medical History:   Diagnosis Date    Depression     Essential hypertension     Hypercholesterolemia     Thyroid disease     hypothyroid    Thyroid disease        Past Surgical History:   Procedure Laterality Date    HX CATARACT REMOVAL Bilateral     HX DILATION AND CURETTAGE      more than once-can't remember    HX ORTHOPAEDIC Bilateral     sena's neuroma    HX ORTHOPAEDIC Right     Right finger     HX ROTATOR CUFF REPAIR Right     HX TONSILLECTOMY      HX UROLOGICAL      \"bladder lift\"       Allergies   Allergen Reactions    Demerol [Meperidine] Other (comments)     Doesn't remember but states reaction was when she had first child    Pcn [Penicillins] Hives       Family History   Problem Relation Age of Onset    Stroke Mother     Cancer Father         Brain & Lung    Diabetes Sister     Stroke Sister     Diabetes Brother     Diabetes Maternal Grandmother     Cancer Sister     Heart Disease Brother        Social History     Tobacco Use    Smoking status: Never Smoker    Smokeless tobacco: Never Used   Substance Use Topics    Alcohol use: No    Drug use: No       Current Outpatient Medications   Medication Sig Dispense Refill    amLODIPine (NORVASC) 2.5 mg tablet Take 1 Tab by mouth nightly. 30 Tab 5    sertraline (ZOLOFT) 50 mg tablet Take 1 Tab by mouth daily. 90 Tab 1    losartan (COZAAR) 100 mg tablet Take 1 Tab by mouth daily. 90 Tab 1    hydroCHLOROthiazide (HYDRODIURIL) 12.5 mg tablet Take 1 Tab by mouth daily. 90 Tab 1    levothyroxine (SYNTHROID) 50 mcg tablet Take 1 Tab by mouth Daily (before breakfast). 30 Tab 3    losartan-hydroCHLOROthiazide (HYZAAR) 100-12.5 mg per tablet TAKE ONE TABLET BY MOUTH DAILY 30 Tab 0    astragalus root (ASTRAGALUS PO) Take  by mouth.  cholecalciferol (VITAMIN D3) 1,000 unit cap Take  by mouth daily.  KRILL OIL PO Take 1 Cap by mouth daily.  cyanocobalamin, vitamin B-12, 2,500 mcg tab Take  by mouth.  co-enzyme Q-10 (CO Q-10) 100 mg capsule Take 100 mg by mouth daily.       melatonin 3 mg tablet Take 3 mg by mouth nightly. Plan:  Obtain authorization for testing from insurance company. Report to follow once testing, scoring, and interpretation completed. ? Organic based neurocognitive issues versus mood disorder or combination of same. ? Problems organic, functional, or both? This note will not be viewable in [address] Neuropsychological Test Results  Patient Testing 3/4/20 Report Completed 3/5/20  A Psychometrist Assisted w/ portions of this evaluation while under my direct  supervision    The following evaluation procedures/tests were administered:      Neuropsychologist Performed, Interpreted, & Reported:  Neuropsychological Mental Status Exam, Revised Memory & Behavior Checklist,  Mini Mental Status Exam, Clock Drawing Test, Jessica-Melzack Pain Questionnaire, Test Of Premorbid Functioning, History Taking  & Clinical Interview With The Patient, Additional History Taking w/ the Patient's Daughter, CASE, ABAS-3, CAMPOS, CPT-III, Review Of Available Records. Psychometrist Administered under Neuropsychologist Supervision & Neuropsychologist Interpreted & Neuropsychologist Reported:  Verbal Fluency Tests, Brent & Bretn - Revised, Trailmaking Test Parts A & B, Wechsler Adult Intelligence Scale - IV, [place] Verbal Learning Test - 3, Grooved Pegboard, Almonte Depression Inventory - II, Almonte Anxiety Inventory. Test Findings:  Test Findings:  Note:  The patients raw data have been compared with currently available norms which include demographic corrections for age, gender, and/or education. Sometimes, the patients scores are compared to demographically similar individuals as close to the patients age, education level, etc., as possible. \"Average\" is viewed as being +/- 1 standard deviation (SD) from the stated mean for a particular test score. \"Low average\" is viewed as being between 1 and 2 SD below the mean, and above average is viewed as being 1 and 2 SD above the mean. Scores falling in the borderline range (between 1-1/2 and 2 SD below the mean) are viewed with particular attention as to whether they are normal or abnormal neurocognitive test scores. Other methods of inference in analyzing the test data are also utilized, including the pattern and range of scores in the profile, bilateral motor functions, and the presence, if any, of pathognomonic signs. Behaviorally, the patient was friendly and cooperative and appeared motivated to perform well during this examination. Scores on the HRB were converted using norms from demographically similar individuals as close to the patient's age as possible. Within this context, the results of this evaluation are viewed as a valid reflection of the patients actual neurocognitive and emotional status. The patient's score of 27/30 on the Mini-Mental Status Exam was normal.  In this regard, she was not oriented to city or floor. Repetition was impaired. Clock drawing was normal.        Her structured word list fluency, as assessed by the FAS Test, was within the mildly to moderately impaired range with a T score of 30. Category fluency was within the mildly to moderately impaired range with a T score of 31. Confrontation naming ability, as assessed by the [facility] - Revised, was within the below average range at 43/60 correct (T = 41). This pattern of performance is indicative of a patient who is at increased risk for day-to-day problems with verbal fluency and confrontation naming was normal.      The patient was administered the [facility] Continuous Performance Test - III and review of the subscales within this instrument revealed numerous concerns for inattentiveness without impulsivity. This pattern of performance is indicative of a patient who is at increased risk for day-to-day problems with sustained visual attention/concentration.      The patient is not showing problems with working memory capacity (50th %ile) or processing speed (50th %ile) on the WAIS-IV. Her Verbal Comprehension Index score of 108 was within the average range. Her Perceptual Reasoning Index score of 96 was average . These scores do not reflect a decline in functioning based on an assessment of premorbid functioning. The patient was administered the [place] Verbal Learning Test  - 3 and generated a normal range and positive to high average learning curve over five repeated auditory word list learning trials. An interference trial was high average. Free and cued, short and long delayed recall were normal.  Recognition recall was normal.  Forced choice recall was normal, suggesting good effort on this test.  This pattern of performance is not indicative of a patient who is at increased risk for day-to-day problems with auditory learning and/or memory. Simple timed visual motor sequencing (Trailmaking Test Part A) was within the above average range with a T score of 58. Her performance on a similar, but more complex task of timed visual motor sequencing (Trailmaking Test Part B) was within the below average range with a T score of 42. She made only one sequencing error on this latter completed test.   This pattern of performance is not indicative of a patient who is at increased risk for day-to-day problems with executive functioning. Fine motor dexterity was within the normal range bilaterally. This does not raise concern for a particularly lateralized brain dysfunction. The patient rated her current level of pain as \"2/5- Discomforting\" on the Jessica-Melzack Pain Questionnaire. She reported pain in her right side of her head/temple, and back of head, right upper arm, right bicep, lower back, lower legs, and feet. Her Almonte Depression Inventory -II score of 8 was within the minimally depressed range. Her Almonte Anxiety Inventory score of 14 reflected mild anxiety.   The patient's responses on the Personality Assessment Inventory were deemed valid for interpretation, though a very high level of defensiveness in responding is noted. Despite this, there are numerous physical signs of depression and irrational fears. She is inwardly much more troubled by self-doubt and misgivings about her adequacy than readily apparent to others. The daughter completed the ABAS-3 and did not report clinically significant concerns regarding the patient's general adaptive skills (32nd %ile), conceptual skills (30th %ile), social skills (42nd  %ile), and practical skills (34th %ile). On the CASE, the daughter reported mild concerns regarding the patient's cognitive functioning as well as her fear of aging. Impressions & Recommendations: This is a mixed normal/abnormal range Neuropsychological Evaluation with respect to neurocognitive functioning. In this regard, she is showing impairments with verbal fluency and sustained attention. At the same time, her performances across all other neurocognitive domains assessed, including mental status, confrontation naming, auditory learning, auditory memory, processing speed, working memory, perceptual reasoning, verbal comprehension, bilateral fine motor dexterity, and executive functioning remain normal.  From an emotional standpoint, the patient was highly defensive in her response style on personality testing and minimization of mood concerns is quite likely. There are numerous physical signs of depression along with irrational fears, and she is inwardly much more troubled by self-doubt and misgivings about her adequacy than readily apparent to others. Thankfully, this profile is not consistent with dementia. Instead, mild cognitive impairment is seen with issues related to attention and verbal fluency only. This is reassuring. I believe mood to be a bigger problem than would be readily apparent to others here.   I suggest a review of her current medication management for depression  (I believe she's been on Zoloft for 30 years?). Psychotherapy is also advised. Consider consult with speech and also consider a low dose medication for attention (non stimulant) if not medically contraindicated. Not currently concerned about competency issues or day-to-day supervision. Stay active mentally, physically, and socially. Baseline now established. Follow up prn. Clinical correlation is, of course, indicated. I will discuss these findings with the patient when she follows up with me in the near future. A follow up Neuropsychological Evaluation is indicated on a prn basis, especially if there are any cognitive and/or emotional changes. DIAGNOSES:  MCI Without Memory Loss     Major Depression - Mild To Moderate     Anxiety - Mild To Moderate     The above information is based upon information currently available to me. If there is any additional information of which I am currently unaware, I would be more than happy to review it upon having it made available to me. Thank you for the opportunity to see this interesting individual.     Sincerely,       Lucy Yousif. Francena Schirmer, PsyD, EdS    CC:  Suzi Wheeler MD    Time Documentation:    [id number]*4    46864 x 1  34027 x 5 Test Administration/Data Gathering By Technician: (3 hours). 87977 x 1 (first 30 minutes), 24645 x 5 (each additional 30 minutes)    96132 x 1  96133 x 1 Testing Evaluation Services by Neuropsychologist (1 hour, 50 minutes) 96132 x 1 (first hour), 96133 x 1 (50 minutes)    Definitions:      07526/45991:  Neurobehavioral Status Exam, Clinical interview. Clinical assessment of thinking, reasoning and judgment, by neuropsychologist, both face to face time with patient and time interpreting those test results and reporting, first and subsequent hours)    14047/33193: Neuropsychological Test Administration by Technician/Psychometrist, first 30 minutes and each additional 30 minutes.      The above includes: Record review. Review of history provided by patient. Review of collaborative information. Testing by Clinician. Review of raw data. Scoring. Report writing of individual tests administered by Clinician. Integration of individual tests administered by psychometrist with NSE/testing by clinician, review of records/history/collaborative information, case Conceptualization, treatment planning, clinical decision making, report writing, coordination Of Care. Psychometry test codes as time spent by psychometrist administering and scoring neurocognitive/psychological tests under supervision of neuropsychologist.  Integral services including scoring of raw data, data interpretation, case conceptualization, report writing etcetera were initiated after the patient finished testing/raw data collected and was completed on the date the report was signed.

## 2020-03-05 NOTE — PROCEDURES
EEG:      Date:  03/04/20    Requesting Physician:  Alycia Liang. MD Briseyda    An EEG is requested in this 80year old with confusion to evaluate for epileptiform abnormality. Medications:  Medications are said to include Zoloft, Cozaar, Synthroid, Hyzaar, melatonin, Norvasc. This tracing is obtained during the awake state. During wakefulness there are intermittent runs of posteriorly-dominant and symmetrical low-to-medium amplitude 9 cycle per second activities which attenuate with eye opening. Lower-voltage faster-frequency activities are seen symmetrically over the anterior head regions. Hyperventilation is not performed due to her age. Intermittent photic stimulation little alters the tracing. Sleep is not attained. Interpretation:   This EEG recorded during the awake state is normal.

## 2020-06-09 ENCOUNTER — TELEPHONE (OUTPATIENT)
Dept: NEUROLOGY | Age: 85
End: 2020-06-09

## 2020-06-09 NOTE — TELEPHONE ENCOUNTER
----- Message from Yoandy Torres sent at 6/9/2020 10:33 AM EDT -----  Regarding: Dr. Shelia Maddox  Patient return call    Caller's first and last name and relationship (if not the patient):  Mrs. Jenni Dooley, pt's daughter    Best contact number(s):  [phone]    Whose call is being returned: The office    Details to clarify the request:  Returning a miss call in regards to r/s cancelled appt. Willing and able to do a virtual appt.      Yoandy Torres

## 2020-06-23 ENCOUNTER — VIRTUAL VISIT (OUTPATIENT)
Dept: NEUROLOGY | Age: 85
End: 2020-06-23

## 2020-06-23 DIAGNOSIS — F33.1 MODERATE EPISODE OF RECURRENT MAJOR DEPRESSIVE DISORDER (HCC): Primary | ICD-10-CM

## 2020-06-23 DIAGNOSIS — G31.84 MILD COGNITIVE IMPAIRMENT: ICD-10-CM

## 2020-06-23 DIAGNOSIS — G60.3 IDIOPATHIC PROGRESSIVE NEUROPATHY: ICD-10-CM

## 2020-06-23 NOTE — PROGRESS NOTES
[address]   [address]   [facility]    [facility]    Kiarra Fowler is a 80 y.o. female who was seen by synchronous (real-time) audio-video technology on 6/23/2020. Consent:  She and/or her healthcare decision maker is aware that this patient-initiated Telehealth encounter is a billable service, with coverage as determined by her insurance carrier. She is aware that she may receive a bill and has provided verbal consent to proceed: Yes    I was in the office while conducting this encounter. Subjective:   Zohra Fowler was seen for Follow-up ([facility])    80-year-old woman who is seen today in follow-up. Her initial consultation with me was February 25 when she came in with her daughter for evaluation of discomfort in her lower extremities as well as memory difficulty. 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 Her history was pain in the dorsum of the right foot as well as a cold feeling in her bilateral feet and legs. She was not complaining of any radicular type symptoms. In regards to her memory difficulty we had several studies. She had a head CT that I personally reviewed today and that showed age-related changes. Carotid Doppler with insignificant stenosis. EEG was unremarkable. Underwent formal neuropsychological evaluation with Dr. Juan Pruitt and this did not demonstrate a dementing process. Instead Dr. Le Rubi evaluation was more reflective of a mild cognitive impairment related to attention and depression. She notes that she does not feel overly blue although she does say that all her life she has been depressed and she has been taking Zoloft for 40 years now. She often wondered if she should discontinue that.   She does say however that she feels like she might be having some sadness come on and she feels like she has to fight the depression. She does say that her memory she believes is markedly worse and is getting worse over time. She also has the dysesthesias in her feet primarily occurring at night when she tries to go to sleep. During the day she does not notice it. She has tried gabapentin and her daughter, who is with her today, relates that when gabapentin was tried with her it gave an adverse reaction. Prior to Admission medications    Medication Sig Start Date End Date Taking? Authorizing Provider   levothyroxine (SYNTHROID) 50 mcg tablet TAKE ONE TABLET BY MOUTH EVERY DAY BEFORE BREAKFAST 4/4/20  Yes Shah-Pandya, Ladd Scheuermann, MD   amLODIPine (NORVASC) 2.5 mg tablet Take 1 Tab by mouth nightly. 1/2/20  Yes Shah-Pandya, Ladd Scheuermann, MD   sertraline (ZOLOFT) 50 mg tablet Take 1 Tab by mouth daily. 1/2/20  Yes Shah-Pandya, Ladd Scheuermann, MD   losartan (COZAAR) 100 mg tablet Take 1 Tab by mouth daily. 1/2/20  Yes Shah-Pandya, Ladd Scheuermann, MD   hydroCHLOROthiazide (HYDRODIURIL) 12.5 mg tablet Take 1 Tab by mouth daily. 1/2/20  Yes Shah-Pandya, Ladd Scheuermann, MD   losartan-hydroCHLOROthiazide [facility]) 100-12.5 mg per tablet TAKE ONE TABLET BY MOUTH DAILY 12/2/19  Yes Shah-Pandya, Ladd Scheuermann, MD   astragalus root (ASTRAGALUS PO) Take  by mouth. Yes Provider, Historical   cholecalciferol (VITAMIN D3) 1,000 unit cap Take  by mouth daily. Yes Provider, Historical   KRILL OIL PO Take 1 Cap by mouth daily. Yes Provider, Historical   cyanocobalamin, vitamin B-12, 2,500 mcg tab Take  by mouth. Yes Provider, Historical   co-enzyme Q-10 (CO Q-10) 100 mg capsule Take 100 mg by mouth daily. Yes Provider, Historical   melatonin 3 mg tablet Take 3 mg by mouth nightly. Yes Provider, Historical     Allergies   Allergen Reactions    Demerol [Meperidine] Other (comments)     Doesn't remember but states reaction was when she had first child   [address]     Examination  She looks well.   She has appropriate dress and grooming. She is engaging and interactive. No icterus. Answers all questions appropriately. Discusses her medical history and current events per      Due to this being a TeleHealth evaluation, many elements of the physical examination are unable to be assessed. 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 In addition with the peripheral neuropathy we discussed that medicines like Neurontin or what we typically use and given the fact she had an adverse reaction to Neurontin I would not use Lyrica or rechallenge her. I think a better idea might be actually to transition her from Zoloft on the Cymbalta. I asked her to discuss with Dr. Karl Pulido perhaps transitioning on the Cymbalta and that way maybe her depression will be better treated and her discomfort would be treated as well. She will make an appointment to discuss that with her    If that is a viable option and she transitions there and does well I will see her back as needed. If that is not felt to be a viable option or if she does not get the relief as expected I will see her back    Pursuant to the emergency declaration under the [address] [facility]5 waiver authority and the [employer] Act, this Virtual  Visit was conducted, with patient's consent, to reduce the patient's risk of exposure to COVID-19 and provide continuity of care for an established patient. Services were provided through a video synchronous discussion virtually to substitute for in-person clinic visit.     Kelsy Maloney MD     Total time: 25 min   Counseling / coordination time: 15 min   > 50% counseling / coordination?: Yes re: as documented above      This document was created with the assistance of voice recognition software and therefore unintended syntax errors may be present despite editing. For any questions please contact me directly. This note will not be viewable in [address]

## 2020-06-29 ENCOUNTER — TELEPHONE (OUTPATIENT)
Dept: INTERNAL MEDICINE CLINIC | Age: 85
End: 2020-06-29

## 2020-06-29 NOTE — TELEPHONE ENCOUNTER
According to OV note with Dr. Carson Callow, he recommended transitioning pt to Cymbalta from Zoloft.

## 2020-06-29 NOTE — TELEPHONE ENCOUNTER
----- Message from [place] sent at 6/29/2020  1:16 PM EDT -----  Regarding: Dr. Nicho Jordan (if not patient):      Relationship of caller (if not patient):      Best contact number(s):[phone]      Name of medication and dosage if known:Cathleen      Is patient out of this medication (yes/no):yes  Pharmacy name:[facility]    Pharmacy listed in chart? (yes/no):  Pharmacy phone number:yes      Details to clarify the request:prescription for [address] [place] [address]

## 2020-06-30 RX ORDER — DULOXETIN HYDROCHLORIDE 30 MG/1
30 CAPSULE, DELAYED RELEASE ORAL DAILY
Qty: 90 CAP | Refills: 1 | Status: SHIPPED | OUTPATIENT
Start: 2020-06-30 | End: 2020-07-30 | Stop reason: ALTCHOICE

## 2020-06-30 NOTE — TELEPHONE ENCOUNTER
Spoke with patient and advised Dr. Leatha Epley is ok with switching to cymbalta. Pt is to discontinue taking zoloft and start cymbalta. Pt understood and was thankful for the call.

## 2020-07-01 ENCOUNTER — TELEPHONE (OUTPATIENT)
Dept: INTERNAL MEDICINE CLINIC | Age: 85
End: 2020-07-01

## 2020-07-01 NOTE — TELEPHONE ENCOUNTER
----- Message from Aparna Miranda sent at 7/1/2020  2:25 PM EDT -----  Regarding: Dr. Ashok Craig first and last name: Shankar Kent      Reason for call:Duloxetine      Callback required yes/no and why:yes      Best contact number(s):[phone]    Questions about the medication Duloxetine  Details to clarify the request:      Aparna Miranda

## 2020-07-02 ENCOUNTER — APPOINTMENT (OUTPATIENT)
Dept: CT IMAGING | Age: 85
End: 2020-07-02
Attending: EMERGENCY MEDICINE
Payer: MEDICARE

## 2020-07-02 ENCOUNTER — HOSPITAL ENCOUNTER (EMERGENCY)
Age: 85
Discharge: HOME | End: 2020-07-02
Attending: EMERGENCY MEDICINE | Admitting: EMERGENCY MEDICINE
Payer: MEDICARE

## 2020-07-02 VITALS
DIASTOLIC BLOOD PRESSURE: 71 MMHG | TEMPERATURE: 98.1 F | OXYGEN SATURATION: 94 % | RESPIRATION RATE: 16 BRPM | WEIGHT: 152.4 LBS | SYSTOLIC BLOOD PRESSURE: 147 MMHG | BODY MASS INDEX: 25.39 KG/M2 | HEART RATE: 58 BPM | HEIGHT: 65 IN

## 2020-07-02 DIAGNOSIS — R07.9 CHEST PAIN, UNSPECIFIED TYPE: Primary | ICD-10-CM

## 2020-07-02 LAB
ABO + RH BLD: NORMAL
ALBUMIN SERPL-MCNC: 3.2 G/DL (ref 3.5–5)
ALBUMIN/GLOB SERPL: 0.9 {RATIO} (ref 1.1–2.2)
ALP SERPL-CCNC: 63 U/L (ref 45–117)
ALT SERPL-CCNC: 23 U/L (ref 12–78)
ANION GAP SERPL CALC-SCNC: 5 MMOL/L (ref 5–15)
APTT PPP: 23.6 SEC (ref 22.1–32)
AST SERPL-CCNC: 17 U/L (ref 15–37)
BASOPHILS # BLD: 0.1 K/UL (ref 0–0.1)
BASOPHILS NFR BLD: 0 % (ref 0–1)
BILIRUB SERPL-MCNC: 0.4 MG/DL (ref 0.2–1)
BLOOD GROUP ANTIBODIES SERPL: NORMAL
BUN SERPL-MCNC: 20 MG/DL (ref 6–20)
BUN/CREAT SERPL: 23 (ref 12–20)
CALCIUM SERPL-MCNC: 8.5 MG/DL (ref 8.5–10.1)
CHLORIDE SERPL-SCNC: 111 MMOL/L (ref 97–108)
CO2 SERPL-SCNC: 27 MMOL/L (ref 21–32)
COMMENT, HOLDF: NORMAL
CREAT SERPL-MCNC: 0.87 MG/DL (ref 0.55–1.02)
D DIMER PPP FEU-MCNC: 0.92 MG/L FEU (ref 0–0.65)
DIFFERENTIAL METHOD BLD: ABNORMAL
EOSINOPHIL # BLD: 0.2 K/UL (ref 0–0.4)
EOSINOPHIL NFR BLD: 1 % (ref 0–7)
ERYTHROCYTE [DISTWIDTH] IN BLOOD BY AUTOMATED COUNT: 11.9 % (ref 11.5–14.5)
GLOBULIN SER CALC-MCNC: 3.5 G/DL (ref 2–4)
GLUCOSE SERPL-MCNC: 93 MG/DL (ref 65–100)
HCT VFR BLD AUTO: 42.7 % (ref 35–47)
HGB BLD-MCNC: 14.3 G/DL (ref 11.5–16)
IMM GRANULOCYTES # BLD AUTO: 0 K/UL (ref 0–0.04)
IMM GRANULOCYTES NFR BLD AUTO: 0 % (ref 0–0.5)
INR PPP: 1 (ref 0.9–1.1)
LIPASE SERPL-CCNC: 103 U/L (ref 73–393)
LYMPHOCYTES # BLD: 2.1 K/UL (ref 0.8–3.5)
LYMPHOCYTES NFR BLD: 16 % (ref 12–49)
MCH RBC QN AUTO: 32.1 PG (ref 26–34)
MCHC RBC AUTO-ENTMCNC: 33.5 G/DL (ref 30–36.5)
MCV RBC AUTO: 95.7 FL (ref 80–99)
MONOCYTES # BLD: 1 K/UL (ref 0–1)
MONOCYTES NFR BLD: 7 % (ref 5–13)
NEUTS SEG # BLD: 10.1 K/UL (ref 1.8–8)
NEUTS SEG NFR BLD: 76 % (ref 32–75)
NRBC # BLD: 0 K/UL (ref 0–0.01)
NRBC BLD-RTO: 0 PER 100 WBC
PLATELET # BLD AUTO: 197 K/UL (ref 150–400)
PMV BLD AUTO: 11.7 FL (ref 8.9–12.9)
POTASSIUM SERPL-SCNC: 3.6 MMOL/L (ref 3.5–5.1)
PROT SERPL-MCNC: 6.7 G/DL (ref 6.4–8.2)
PROTHROMBIN TIME: 10.3 SEC (ref 9–11.1)
RBC # BLD AUTO: 4.46 M/UL (ref 3.8–5.2)
SAMPLES BEING HELD,HOLD: NORMAL
SODIUM SERPL-SCNC: 143 MMOL/L (ref 136–145)
SPECIMEN EXP DATE BLD: NORMAL
THERAPEUTIC RANGE,PTTT: NORMAL SECS (ref 58–77)
TROPONIN I SERPL-MCNC: <0.05 NG/ML
TROPONIN I SERPL-MCNC: <0.05 NG/ML
WBC # BLD AUTO: 13.5 K/UL (ref 3.6–11)

## 2020-07-02 PROCEDURE — 86900 BLOOD TYPING SEROLOGIC ABO: CPT

## 2020-07-02 PROCEDURE — 85610 PROTHROMBIN TIME: CPT

## 2020-07-02 PROCEDURE — 36415 COLL VENOUS BLD VENIPUNCTURE: CPT

## 2020-07-02 PROCEDURE — 83690 ASSAY OF LIPASE: CPT

## 2020-07-02 PROCEDURE — 74011636320 HC RX REV CODE- 636/320: Performed by: RADIOLOGY

## 2020-07-02 PROCEDURE — 85025 COMPLETE CBC W/AUTO DIFF WBC: CPT

## 2020-07-02 PROCEDURE — 84484 ASSAY OF TROPONIN QUANT: CPT

## 2020-07-02 PROCEDURE — 71275 CT ANGIOGRAPHY CHEST: CPT

## 2020-07-02 PROCEDURE — 85730 THROMBOPLASTIN TIME PARTIAL: CPT

## 2020-07-02 PROCEDURE — 93005 ELECTROCARDIOGRAM TRACING: CPT

## 2020-07-02 PROCEDURE — 85379 FIBRIN DEGRADATION QUANT: CPT

## 2020-07-02 PROCEDURE — 80053 COMPREHEN METABOLIC PANEL: CPT

## 2020-07-02 PROCEDURE — 99285 EMERGENCY DEPT VISIT HI MDM: CPT

## 2020-07-02 RX ORDER — ACETAMINOPHEN 500 MG
1000 TABLET ORAL ONCE
Status: DISCONTINUED | OUTPATIENT
Start: 2020-07-02 | End: 2020-07-02 | Stop reason: HOSPADM

## 2020-07-02 RX ADMIN — IOPAMIDOL 80 ML: 755 INJECTION, SOLUTION INTRAVENOUS at 13:26

## 2020-07-02 NOTE — ED TRIAGE NOTES
Arrives via EMS, walked from stretcher in [place] to room stretcher. Sharp substernal cp radiating to back 9/10, worse with indpiration. No know cardiac hx. Received [facility]A at assisted living clinic.

## 2020-07-02 NOTE — ED PROVIDER NOTES
The history is provided by the patient. No  was used. Chest Pain (Angina)    This is a new problem. The current episode started 3 to 5 hours ago. The problem has not changed since onset. The problem occurs constantly. The pain is associated with breathing and movement. The pain is present in the left side. The pain is at a severity of 9/10. The pain is severe. The quality of the pain is described as sharp and stabbing. The pain radiates to the upper back. The symptoms are aggravated by movement, palpation and deep breathing. Pertinent negatives include no abdominal pain, no back pain, no claudication, no cough, no diaphoresis, no dizziness, no exertional chest pressure, no fever, no headaches, no hemoptysis, no irregular heartbeat, no leg pain, no lower extremity edema, no malaise/fatigue, no nausea, no near-syncope, no numbness, no orthopnea, no palpitations, no PND, no shortness of breath, no sputum production, no vomiting and no weakness. She has tried aspirin for the symptoms. The treatment provided no relief. Risk factors include dyslipidemia, hypertension and postmenopause. Her past medical history is significant for HTN. Her past medical history does not include aneurysm, cancer, DM, DVT, PE or CHF.  Procedural history includes echocardiogram.       Past Medical History:   Diagnosis Date    Depression     Essential hypertension     Hypercholesterolemia     Thyroid disease     hypothyroid    Thyroid disease        Past Surgical History:   Procedure Laterality Date    HX CATARACT REMOVAL Bilateral     HX DILATION AND CURETTAGE      more than once-can't remember    HX ORTHOPAEDIC Bilateral     sena's neuroma    HX ORTHOPAEDIC Right     Right finger     HX ROTATOR CUFF REPAIR Right     HX TONSILLECTOMY      HX UROLOGICAL      \"bladder lift\"         Family History:   Problem Relation Age of Onset    Stroke Mother     Cancer Father         Brain & Lung    Diabetes Sister    [facility] Stroke Sister     Diabetes Brother     Diabetes Maternal Grandmother     Cancer Sister     Heart Disease Brother        Social History     Socioeconomic History    Marital status:      Spouse name: Not on file    Number of children: Not on file    Years of education: Not on file    Highest education level: Not on file   Occupational History    Not on file   Social Needs    Financial resource strain: Not on file    Food insecurity     Worry: Not on file     Inability: Not on file   [employer] needs     Medical: Not on file     Non-medical: Not on file   Tobacco Use    Smoking status: Never Smoker    Smokeless tobacco: Never Used   Substance and Sexual Activity    Alcohol use: No    Drug use: No    Sexual activity: Never   Lifestyle    Physical activity     Days per week: Not on file     Minutes per session: Not on file    Stress: Not on file   Relationships    Social connections     Talks on phone: Not on file     Gets together: Not on file     Attends Yarsanism service: Not on file     Active member of club or organization: Not on file     Attends meetings of clubs or organizations: Not on file     Relationship status: Not on file    Intimate partner violence     Fear of current or ex partner: Not on file     Emotionally abused: Not on file     Physically abused: Not on file     Forced sexual activity: Not on file   Other Topics Concern    Not on file   Social History Narrative    Not on file         ALLERGIES: Demerol [meperidine] and Pcn [penicillins]    Review of Systems   Constitutional: Negative for activity change, chills, diaphoresis, fever and malaise/fatigue. HENT: Negative for nosebleeds, sore throat, trouble swallowing and voice change. Eyes: Negative for visual disturbance. Respiratory: Negative for cough, hemoptysis, sputum production and shortness of breath. Cardiovascular: Positive for chest pain.  Negative for palpitations, orthopnea, claudication, PND and near-syncope. Gastrointestinal: Negative for abdominal pain, constipation, diarrhea, nausea and vomiting. Genitourinary: Negative for difficulty urinating, dysuria, hematuria and urgency. Musculoskeletal: Negative for back pain, neck pain and neck stiffness. Skin: Negative for color change. Allergic/Immunologic: Negative for immunocompromised state. Neurological: Negative for dizziness, seizures, syncope, weakness, light-headedness, numbness and headaches. Psychiatric/Behavioral: Negative for behavioral problems, confusion, hallucinations, self-injury and suicidal ideas. Vitals:    07/02/20 1145   BP: 150/65   Pulse: 61   Resp: 14   Temp: 98.1 °F (36.7 °C)   SpO2: 98%   Weight: 69.1 kg (152 lb 6.4 oz)   Height: 5' 5\" (1.651 m)            Physical Exam  Vitals signs and nursing note reviewed. Constitutional:       General: She is not in acute distress. Appearance: She is well-developed. She is not diaphoretic. HENT:      Head: Normocephalic and atraumatic. Eyes:      Pupils: Pupils are equal, round, and reactive to light. Neck:      Musculoskeletal: Normal range of motion and neck supple. Cardiovascular:      Rate and Rhythm: Normal rate and regular rhythm. Heart sounds: Normal heart sounds. No murmur. No friction rub. No gallop. Pulmonary:      Effort: Pulmonary effort is normal. No respiratory distress. Breath sounds: Normal breath sounds. No wheezing. Chest:       Abdominal:      General: Bowel sounds are normal. There is no distension. Palpations: Abdomen is soft. Tenderness: There is no abdominal tenderness. There is no guarding or rebound. Musculoskeletal: Normal range of motion. Skin:     General: Skin is warm. Findings: No rash. Neurological:      Mental Status: She is alert and oriented to person, place, and time. Psychiatric:         Behavior: Behavior normal.         Thought Content:  Thought content normal.         Judgment: Judgment normal.          MDM     This is an 54-year-old female with past medical history, review of systems, physical exam as above, presenting with complaints of chest and back pain. Patient states initial pain began as chest pain, radiating to the back, sharp, midsternal, currently mild in nature. She denies similar episodes, denies falls, trauma denies shortness of breath, cough, nausea or vomiting. Patient denies a history of aneurysm, or coronary artery disease. Physical exam is remarkable for well-appearing elderly female, in no acute distress, noted to be afebrile, mildly hypertensive, without tachycardia, satting well on room air. She has clear breath sounds to auscultation, mild substernal chest wall tenderness to palpation, benign abdomen. Differential includes chest wall pain, aortic aneurysm, ACS. Plan to give pain control, obtain CMP, CBC, EKG, chest x-ray, cardiac enzymes and d-dimer. We will reassess, and make a disposition. Procedures    Update:  Patient states significant improvement in discomfort, unremarkable EKG, negative troponin x2, CTA without structural abnormality. Heart score equal to 4. Patient pain pattern consistent with musculoskeletal pain reproducible with palpation. Plan to discharge home with over-the-counter pain medication, primary care follow-up, return precautions given.

## 2020-07-02 NOTE — ED NOTES
Patient does not appear to be in any acute distress/shows no evidence of clinical instability at this time. Provider has reviewed discharge instructions with the patient/family. The patient verbalized understanding instructions as well as need for follow up for any further symptoms.     Discharge papers given, education provided, and any questions answered. Patient discharged by provider. Pt accompanied out of department by daughter.

## 2020-07-02 NOTE — TELEPHONE ENCOUNTER
Told her to alternate the cymbalta with zoloft for a week and then transition to cymbalta once a day- she is aware of side effects of nausea and constipation

## 2020-07-02 NOTE — DISCHARGE INSTRUCTIONS

## 2020-07-05 LAB
ATRIAL RATE: 61 BPM
CALCULATED P AXIS, ECG09: 31 DEGREES
CALCULATED R AXIS, ECG10: 23 DEGREES
CALCULATED T AXIS, ECG11: 66 DEGREES
DIAGNOSIS, 93000: NORMAL
P-R INTERVAL, ECG05: 172 MS
Q-T INTERVAL, ECG07: 398 MS
QRS DURATION, ECG06: 74 MS
QTC CALCULATION (BEZET), ECG08: 400 MS
VENTRICULAR RATE, ECG03: 61 BPM

## 2020-07-06 RX ORDER — LOSARTAN POTASSIUM 100 MG/1
TABLET ORAL
Qty: 90 TAB | Refills: 0 | Status: SHIPPED | OUTPATIENT
Start: 2020-07-06 | End: 2020-10-01

## 2020-07-06 RX ORDER — HYDROCHLOROTHIAZIDE 12.5 MG/1
TABLET ORAL
Qty: 90 TAB | Refills: 0 | Status: SHIPPED | OUTPATIENT
Start: 2020-07-06 | End: 2020-12-15 | Stop reason: ALTCHOICE

## 2020-07-30 ENCOUNTER — TELEPHONE (OUTPATIENT)
Dept: INTERNAL MEDICINE CLINIC | Age: 85
End: 2020-07-30

## 2020-07-30 RX ORDER — SERTRALINE HYDROCHLORIDE 50 MG/1
50 TABLET, FILM COATED ORAL DAILY
Qty: 90 TAB | Refills: 1 | Status: SHIPPED | OUTPATIENT
Start: 2020-07-30 | End: 2021-02-07

## 2020-07-30 NOTE — TELEPHONE ENCOUNTER
Per Dr. Jose Rafael Rivera d/t therapeutic failure (see mychart message) on cymbalta she would like to discontinue it and restart patient on Zoloft. Spoke with pt's daughter, Elicia Choi, and pt to advise of change. Both understood and were thankful for the call.

## 2020-08-24 RX ORDER — LEVOTHYROXINE AND LIOTHYRONINE 57; 13.5 UG/1; UG/1
90 TABLET ORAL DAILY
Qty: 90 TAB | Refills: 1 | Status: SHIPPED | OUTPATIENT
Start: 2020-08-24 | End: 2021-02-07

## 2020-09-01 ENCOUNTER — HOSPITAL ENCOUNTER (EMERGENCY)
Age: 85
Discharge: HOME | End: 2020-09-01
Attending: EMERGENCY MEDICINE
Payer: MEDICARE

## 2020-09-01 ENCOUNTER — APPOINTMENT (OUTPATIENT)
Dept: CT IMAGING | Age: 85
End: 2020-09-01
Attending: EMERGENCY MEDICINE
Payer: MEDICARE

## 2020-09-01 VITALS
WEIGHT: 152.4 LBS | HEART RATE: 62 BPM | SYSTOLIC BLOOD PRESSURE: 124 MMHG | TEMPERATURE: 98.1 F | OXYGEN SATURATION: 98 % | BODY MASS INDEX: 25.39 KG/M2 | RESPIRATION RATE: 18 BRPM | DIASTOLIC BLOOD PRESSURE: 63 MMHG | HEIGHT: 65 IN

## 2020-09-01 DIAGNOSIS — R51.9 ACUTE NONINTRACTABLE HEADACHE, UNSPECIFIED HEADACHE TYPE: Primary | ICD-10-CM

## 2020-09-01 DIAGNOSIS — R42 DIZZY SPELLS: ICD-10-CM

## 2020-09-01 LAB
ANION GAP SERPL CALC-SCNC: 9 MMOL/L (ref 5–15)
BASOPHILS # BLD: 0.1 K/UL (ref 0–0.1)
BASOPHILS NFR BLD: 1 % (ref 0–1)
BUN SERPL-MCNC: 27 MG/DL (ref 6–20)
BUN/CREAT SERPL: 24 (ref 12–20)
CALCIUM SERPL-MCNC: 8.6 MG/DL (ref 8.5–10.1)
CHLORIDE SERPL-SCNC: 110 MMOL/L (ref 97–108)
CO2 SERPL-SCNC: 22 MMOL/L (ref 21–32)
CREAT SERPL-MCNC: 1.12 MG/DL (ref 0.55–1.02)
DIFFERENTIAL METHOD BLD: NORMAL
EOSINOPHIL # BLD: 0.2 K/UL (ref 0–0.4)
EOSINOPHIL NFR BLD: 2 % (ref 0–7)
ERYTHROCYTE [DISTWIDTH] IN BLOOD BY AUTOMATED COUNT: 12 % (ref 11.5–14.5)
ERYTHROCYTE [SEDIMENTATION RATE] IN BLOOD: 11 MM/HR (ref 0–30)
GLUCOSE SERPL-MCNC: 84 MG/DL (ref 65–100)
HCT VFR BLD AUTO: 40.4 % (ref 35–47)
HGB BLD-MCNC: 13.2 G/DL (ref 11.5–16)
IMM GRANULOCYTES # BLD AUTO: 0 K/UL (ref 0–0.04)
IMM GRANULOCYTES NFR BLD AUTO: 0 % (ref 0–0.5)
LYMPHOCYTES # BLD: 2.9 K/UL (ref 0.8–3.5)
LYMPHOCYTES NFR BLD: 31 % (ref 12–49)
MCH RBC QN AUTO: 31.9 PG (ref 26–34)
MCHC RBC AUTO-ENTMCNC: 32.7 G/DL (ref 30–36.5)
MCV RBC AUTO: 97.6 FL (ref 80–99)
MONOCYTES # BLD: 0.9 K/UL (ref 0–1)
MONOCYTES NFR BLD: 9 % (ref 5–13)
NEUTS SEG # BLD: 5.4 K/UL (ref 1.8–8)
NEUTS SEG NFR BLD: 57 % (ref 32–75)
NRBC # BLD: 0 K/UL (ref 0–0.01)
NRBC BLD-RTO: 0 PER 100 WBC
PLATELET # BLD AUTO: 208 K/UL (ref 150–400)
PMV BLD AUTO: 11.7 FL (ref 8.9–12.9)
POTASSIUM SERPL-SCNC: 4.2 MMOL/L (ref 3.5–5.1)
RBC # BLD AUTO: 4.14 M/UL (ref 3.8–5.2)
SODIUM SERPL-SCNC: 141 MMOL/L (ref 136–145)
TROPONIN I SERPL-MCNC: <0.05 NG/ML
WBC # BLD AUTO: 9.5 K/UL (ref 3.6–11)

## 2020-09-01 PROCEDURE — 85652 RBC SED RATE AUTOMATED: CPT

## 2020-09-01 PROCEDURE — 70450 CT HEAD/BRAIN W/O DYE: CPT

## 2020-09-01 PROCEDURE — 99282 EMERGENCY DEPT VISIT SF MDM: CPT

## 2020-09-01 PROCEDURE — 36415 COLL VENOUS BLD VENIPUNCTURE: CPT

## 2020-09-01 PROCEDURE — 85025 COMPLETE CBC W/AUTO DIFF WBC: CPT

## 2020-09-01 PROCEDURE — 84484 ASSAY OF TROPONIN QUANT: CPT

## 2020-09-01 PROCEDURE — 80048 BASIC METABOLIC PNL TOTAL CA: CPT

## 2020-09-01 NOTE — ED TRIAGE NOTES
Pt reports a left sided headache onset last night accompanied by left ear pain. Pt states her headache is now improved. Pt also reports dizziness and unsteady gait since medication changes about a month ago. Denies weakness, numbness, vision changes, or speech difficulty.

## 2020-09-01 NOTE — ED PROVIDER NOTES
Date of Service:  9/1/2020    Patient:  Jorge Mead    Chief Complaint:  Headache       HPI:  Jorge Mead is a 80 y.o.  female who presents for evaluation of headache. Patient states that last night she developed a left-sided headache. This morning continued. She describes it as not the worst headache she ever had but is \"pretty bad\" headache. \"  Patient notes that she had some blurry vision in the left eye associated with a headache. Headache is now resolved. She had no other acute symptoms with the headache specifically denying any balance issues numbness tingling facial asymmetry trouble speaking. Patient also notes that about a month ago she was taken off of her Zofran as well as her thyroid medicine was changed. Ever since she has been having complaints of dizziness when she ambulates. She was put back on her regular medicines and states that since the change was made, she has been feeling remarkably better she is just not completely back to 100%.   Otherwise she denies chest pain shortness of breath fevers chills dysuria or other acute           Past Medical History:   Diagnosis Date    Depression     Essential hypertension     Hypercholesterolemia     Thyroid disease     hypothyroid    Thyroid disease        Past Surgical History:   Procedure Laterality Date    HX CATARACT REMOVAL Bilateral     HX DILATION AND CURETTAGE      more than once-can't remember    HX ORTHOPAEDIC Bilateral     sena's neuroma    HX ORTHOPAEDIC Right     Right finger     HX ROTATOR CUFF REPAIR Right     HX TONSILLECTOMY      HX UROLOGICAL      \"bladder lift\"         Family History:   Problem Relation Age of Onset    Stroke Mother     Cancer Father         Brain & Lung    Diabetes Sister     Stroke Sister     Diabetes Brother     Diabetes Maternal Grandmother     Cancer Sister     Heart Disease Brother        Social History     Socioeconomic History    Marital status:      Spouse name: Not on file  Number of children: Not on file    Years of education: Not on file    Highest education level: Not on file   Occupational History    Not on file   Social Needs    Financial resource strain: Not on file    Food insecurity     Worry: Not on file     Inability: Not on file    Transportation needs     Medical: Not on file     Non-medical: Not on file   Tobacco Use    Smoking status: Never Smoker    Smokeless tobacco: Never Used   Substance and Sexual Activity    Alcohol use: No    Drug use: No    Sexual activity: Never   Lifestyle    Physical activity     Days per week: Not on file     Minutes per session: Not on file    Stress: Not on file   Relationships    Social connections     Talks on phone: Not on file     Gets together: Not on file     Attends Buddhist service: Not on file     Active member of club or organization: Not on file     Attends meetings of clubs or organizations: Not on file     Relationship status: Not on file    Intimate partner violence     Fear of current or ex partner: Not on file     Emotionally abused: Not on file     Physically abused: Not on file     Forced sexual activity: Not on file   Other Topics Concern    Not on file   Social History Narrative    Not on file         ALLERGIES: Demerol [meperidine] and Pcn [penicillins]    Review of Systems   Constitutional: Negative for fever. HENT: Negative for hearing loss. Eyes: Positive for visual disturbance. Respiratory: Negative for shortness of breath. Cardiovascular: Negative for chest pain. Gastrointestinal: Negative for abdominal pain. Genitourinary: Negative for flank pain. Musculoskeletal: Negative for back pain. Skin: Negative for rash. Neurological: Positive for light-headedness and headaches. Negative for dizziness, seizures, facial asymmetry, speech difficulty, weakness and numbness. Psychiatric/Behavioral: Negative for confusion.        Vitals:    09/01/20 1520   BP: 124/63   Pulse: 62   Resp: 18   Temp: 98.1 °F (36.7 °C)   SpO2: 98%   Weight: 69.1 kg (152 lb 6.4 oz)   Height: 5' 5\" (1.651 m)            Physical Exam  Vitals signs and nursing note reviewed. Constitutional:       General: She is not in acute distress. Appearance: She is well-developed. HENT:      Head: Normocephalic and atraumatic. Eyes:      General: No scleral icterus. Conjunctiva/sclera: Conjunctivae normal.      Pupils: Pupils are equal, round, and reactive to light. Neck:      Musculoskeletal: Neck supple. Vascular: No JVD. Trachea: No tracheal deviation. Cardiovascular:      Rate and Rhythm: Normal rate and regular rhythm. Pulmonary:      Effort: Pulmonary effort is normal. No respiratory distress. Breath sounds: Normal breath sounds. Abdominal:      General: Bowel sounds are normal.      Palpations: Abdomen is soft. Tenderness: There is no abdominal tenderness. Musculoskeletal: Normal range of motion. Right lower leg: No edema. Left lower leg: No edema. Skin:     General: Skin is warm and dry. Capillary Refill: Capillary refill takes less than 2 seconds. Findings: No rash. Neurological:      Mental Status: She is alert and oriented to person, place, and time. Mental status is at baseline. Cranial Nerves: No cranial nerve deficit. Sensory: No sensory deficit. Motor: No weakness. Psychiatric:         Mood and Affect: Mood normal.         Behavior: Behavior normal.          MDM  Number of Diagnoses or Management Options  Acute nonintractable headache, unspecified headache type:   Dizzy spells:         VITAL SIGNS:  No data found. LABS:  No results found for this or any previous visit (from the past 6 hour(s)). IMAGING:  CT HEAD WO CONT   Final Result   IMPRESSION: No acute intracranial hemorrhage, mass or infarct.                 Medications During Visit:  Medications - No data to display      DECISION MAKING:  Elaine Lr is a 80 y.o. female who comes in as above. Work-up as above. Here, patient's headache is resolved by the time I saw her she is no longer having episodes of dizziness. Her neurological exam is unremarkable and she is able to ambulate here. Patient discharged home in stable condition with PCP follow-up. All questions answered      IMPRESSION:  1. Acute nonintractable headache, unspecified headache type    2. Dizzy spells        DISPOSITION:  Discharged      Discharge Medication List as of 9/1/2020  5:59 PM           Follow-up Information     Follow up With Specialties Details Why Contact Info    Ector Craft MD Internal Medicine Schedule an appointment as soon as possible for a visit   [address] [place] [address]  [facility]  [phone]              The patient is asked to follow-up with their primary care provider in the next several days. They are to call tomorrow for an appointment. The patient is asked to return promptly for any increased concerns or worsening of symptoms. They can return to this emergency department or any other emergency department.       Procedures

## 2020-09-14 ENCOUNTER — HOSPITAL ENCOUNTER (EMERGENCY)
Age: 85
Discharge: HOME | End: 2020-09-14
Attending: EMERGENCY MEDICINE
Payer: MEDICARE

## 2020-09-14 VITALS
TEMPERATURE: 98.3 F | SYSTOLIC BLOOD PRESSURE: 142 MMHG | DIASTOLIC BLOOD PRESSURE: 53 MMHG | RESPIRATION RATE: 17 BRPM | HEART RATE: 65 BPM | OXYGEN SATURATION: 96 %

## 2020-09-14 DIAGNOSIS — I10 HYPERTENSION, UNSPECIFIED TYPE: Primary | ICD-10-CM

## 2020-09-14 LAB
ALBUMIN SERPL-MCNC: 3.4 G/DL (ref 3.5–5)
ALBUMIN/GLOB SERPL: 1 {RATIO} (ref 1.1–2.2)
ALP SERPL-CCNC: 59 U/L (ref 45–117)
ALT SERPL-CCNC: 28 U/L (ref 12–78)
ANION GAP SERPL CALC-SCNC: 7 MMOL/L (ref 5–15)
AST SERPL-CCNC: 18 U/L (ref 15–37)
BASOPHILS # BLD: 0.1 K/UL (ref 0–0.1)
BASOPHILS NFR BLD: 1 % (ref 0–1)
BILIRUB SERPL-MCNC: 0.4 MG/DL (ref 0.2–1)
BUN SERPL-MCNC: 19 MG/DL (ref 6–20)
BUN/CREAT SERPL: 25 (ref 12–20)
CALCIUM SERPL-MCNC: 8.7 MG/DL (ref 8.5–10.1)
CHLORIDE SERPL-SCNC: 111 MMOL/L (ref 97–108)
CO2 SERPL-SCNC: 24 MMOL/L (ref 21–32)
COMMENT, HOLDF: NORMAL
CREAT SERPL-MCNC: 0.77 MG/DL (ref 0.55–1.02)
DIFFERENTIAL METHOD BLD: NORMAL
EOSINOPHIL # BLD: 0.2 K/UL (ref 0–0.4)
EOSINOPHIL NFR BLD: 2 % (ref 0–7)
ERYTHROCYTE [DISTWIDTH] IN BLOOD BY AUTOMATED COUNT: 11.9 % (ref 11.5–14.5)
GLOBULIN SER CALC-MCNC: 3.4 G/DL (ref 2–4)
GLUCOSE SERPL-MCNC: 94 MG/DL (ref 65–100)
HCT VFR BLD AUTO: 39.5 % (ref 35–47)
HGB BLD-MCNC: 13.3 G/DL (ref 11.5–16)
IMM GRANULOCYTES # BLD AUTO: 0 K/UL (ref 0–0.04)
IMM GRANULOCYTES NFR BLD AUTO: 0 % (ref 0–0.5)
LYMPHOCYTES # BLD: 3.5 K/UL (ref 0.8–3.5)
LYMPHOCYTES NFR BLD: 34 % (ref 12–49)
MCH RBC QN AUTO: 32 PG (ref 26–34)
MCHC RBC AUTO-ENTMCNC: 33.7 G/DL (ref 30–36.5)
MCV RBC AUTO: 95 FL (ref 80–99)
MONOCYTES # BLD: 0.9 K/UL (ref 0–1)
MONOCYTES NFR BLD: 8 % (ref 5–13)
NEUTS SEG # BLD: 5.7 K/UL (ref 1.8–8)
NEUTS SEG NFR BLD: 55 % (ref 32–75)
NRBC # BLD: 0 K/UL (ref 0–0.01)
NRBC BLD-RTO: 0 PER 100 WBC
PLATELET # BLD AUTO: 213 K/UL (ref 150–400)
PMV BLD AUTO: 11.7 FL (ref 8.9–12.9)
POTASSIUM SERPL-SCNC: 3.7 MMOL/L (ref 3.5–5.1)
PROT SERPL-MCNC: 6.8 G/DL (ref 6.4–8.2)
RBC # BLD AUTO: 4.16 M/UL (ref 3.8–5.2)
SAMPLES BEING HELD,HOLD: NORMAL
SODIUM SERPL-SCNC: 142 MMOL/L (ref 136–145)
TROPONIN I SERPL-MCNC: <0.05 NG/ML
WBC # BLD AUTO: 10.4 K/UL (ref 3.6–11)

## 2020-09-14 PROCEDURE — 36415 COLL VENOUS BLD VENIPUNCTURE: CPT

## 2020-09-14 PROCEDURE — 85025 COMPLETE CBC W/AUTO DIFF WBC: CPT

## 2020-09-14 PROCEDURE — 84484 ASSAY OF TROPONIN QUANT: CPT

## 2020-09-14 PROCEDURE — 80053 COMPREHEN METABOLIC PANEL: CPT

## 2020-09-14 PROCEDURE — 93005 ELECTROCARDIOGRAM TRACING: CPT

## 2020-09-14 PROCEDURE — 99285 EMERGENCY DEPT VISIT HI MDM: CPT

## 2020-09-14 RX ORDER — SODIUM CHLORIDE 0.9 % (FLUSH) 0.9 %
5-40 SYRINGE (ML) INJECTION AS NEEDED
Status: DISCONTINUED | OUTPATIENT
Start: 2020-09-14 | End: 2020-09-14 | Stop reason: HOSPADM

## 2020-09-14 RX ORDER — SODIUM CHLORIDE 0.9 % (FLUSH) 0.9 %
5-40 SYRINGE (ML) INJECTION EVERY 8 HOURS
Status: DISCONTINUED | OUTPATIENT
Start: 2020-09-14 | End: 2020-09-14 | Stop reason: HOSPADM

## 2020-09-14 NOTE — DISCHARGE INSTRUCTIONS

## 2020-09-14 NOTE — ED PROVIDER NOTES
History of hypertension, hyperlipidemia, depression, hypothyroidism. She presents via EMS with complaints of elevated blood pressure. She states that she took it prior to arrival and it was 077 systolic. Per EMS, it was in the 296W and 475J systolic en route. She states that she has had dizziness for the past month or so. She describes it as feeling unsteady on her feet especially when she leans over. She denies chest pain, dyspnea, fever, cough, congestion, vomiting, diarrhea. She states that she has occasional weakness. Her appetite has been normal.  She takes losartan for her blood pressure. She is also on a diuretic. She states that her PCP told her she could stop the diuretic because it was making her go to the bathroom so often. However, she only missed 1 day of the medication (which was yesterday). She took a dose earlier today when her blood pressure read high. She states that she has been compliant with her medications. She denies increased stress. She was seen here 10 days ago for a headache and had an unremarkable evaluation including a negative head CT.            Past Medical History:   Diagnosis Date    Depression     Essential hypertension     Hypercholesterolemia     Thyroid disease     hypothyroid    Thyroid disease        Past Surgical History:   Procedure Laterality Date    HX CATARACT REMOVAL Bilateral     HX DILATION AND CURETTAGE      more than once-can't remember    HX ORTHOPAEDIC Bilateral     sena's neuroma    HX ORTHOPAEDIC Right     Right finger     HX ROTATOR CUFF REPAIR Right     HX TONSILLECTOMY      HX UROLOGICAL      \"bladder lift\"         Family History:   Problem Relation Age of Onset    Stroke Mother     Cancer Father         Brain & Lung    Diabetes Sister     Stroke Sister     Diabetes Brother     Diabetes Maternal Grandmother     Cancer Sister     Heart Disease Brother        Social History     Socioeconomic History    Marital status:  Spouse name: Not on file    Number of children: Not on file    Years of education: Not on file    Highest education level: Not on file   Occupational History    Not on file   Social Needs    Financial resource strain: Not on file    Food insecurity     Worry: Not on file     Inability: Not on file    Transportation needs     Medical: Not on file     Non-medical: Not on file   Tobacco Use    Smoking status: Never Smoker    Smokeless tobacco: Never Used   Substance and Sexual Activity    Alcohol use: No    Drug use: No    Sexual activity: Never   Lifestyle    Physical activity     Days per week: Not on file     Minutes per session: Not on file    Stress: Not on file   Relationships    Social connections     Talks on phone: Not on file     Gets together: Not on file     Attends Hindu service: Not on file     Active member of club or organization: Not on file     Attends meetings of clubs or organizations: Not on file     Relationship status: Not on file    Intimate partner violence     Fear of current or ex partner: Not on file     Emotionally abused: Not on file     Physically abused: Not on file     Forced sexual activity: Not on file   Other Topics Concern    Not on file   Social History Narrative    Not on file         ALLERGIES: Demerol [meperidine] and Pcn [penicillins]    Review of Systems   All other systems reviewed and are negative. Vitals:    09/14/20 0140 09/14/20 0143   BP: (!) 183/66 (!) 192/61   Pulse: 66 69   SpO2: 98% 97%            Physical Exam  Vitals signs and nursing note reviewed. Constitutional:       Appearance: She is well-developed. HENT:      Head: Normocephalic and atraumatic. Eyes:      Conjunctiva/sclera: Conjunctivae normal.   Neck:      Musculoskeletal: Neck supple. Trachea: No tracheal deviation. Cardiovascular:      Rate and Rhythm: Normal rate and regular rhythm. Heart sounds: Normal heart sounds. No murmur. No friction rub. No gallop. Pulmonary:      Effort: Pulmonary effort is normal.      Breath sounds: Normal breath sounds. Abdominal:      Palpations: Abdomen is soft. Tenderness: There is no abdominal tenderness. Musculoskeletal:         General: No deformity. Skin:     General: Skin is warm and dry. Neurological:      Mental Status: She is alert. Comments: oriented          MDM       Procedures    EKG: Normal sinus rhythm; rate of 64; nonspecific ST, T wave abnormalities. Miriam Purcell MD  2:53 AM    Progress Note:  Results, treatment, and follow up plan have been discussed with patient. Questions were answered. Miriam Purcell MD  2:53 AM    Assessment/plan: Elevated blood pressures -reassuring appearance/exam was stable vital signs (blood pressure trending downward). No evidence of endorgan damage. CBC, CMP, troponin, EKG okay. Home with PCP follow-up.   Miriam Purcell MD  2:54 AM

## 2020-09-15 LAB
ATRIAL RATE: 64 BPM
CALCULATED P AXIS, ECG09: 71 DEGREES
CALCULATED R AXIS, ECG10: 13 DEGREES
CALCULATED T AXIS, ECG11: 59 DEGREES
DIAGNOSIS, 93000: NORMAL
P-R INTERVAL, ECG05: 156 MS
Q-T INTERVAL, ECG07: 408 MS
QRS DURATION, ECG06: 68 MS
QTC CALCULATION (BEZET), ECG08: 420 MS
VENTRICULAR RATE, ECG03: 64 BPM

## 2020-10-01 RX ORDER — LOSARTAN POTASSIUM 100 MG/1
TABLET ORAL
Qty: 90 TAB | Refills: 0 | Status: SHIPPED | OUTPATIENT
Start: 2020-10-01 | End: 2021-01-02

## 2020-12-09 ENCOUNTER — TELEPHONE (OUTPATIENT)
Dept: INTERNAL MEDICINE CLINIC | Age: 85
End: 2020-12-09

## 2020-12-11 NOTE — TELEPHONE ENCOUNTER
Martha Johns MRN: [id number]    Date: 12/15/2020 Status: [facility]    Time: 11:45 AM Length: 15 [id number]   Visit Type: VV SPECIAL USE CASE [[id number]] Copay: $0.00    Provider: Alysia Quiroz MD Department: [facility] INT [address]    Referral #:   Referral Status:      Referring Provider:   Patient Type:      Notes: VV - follow up   send link [phone]  *if unable to reach by cell above please follow up by callling [phone].     Disposition Notes:

## 2020-12-15 ENCOUNTER — VIRTUAL VISIT (OUTPATIENT)
Dept: INTERNAL MEDICINE CLINIC | Age: 85
End: 2020-12-15
Payer: MEDICARE

## 2020-12-15 DIAGNOSIS — E03.9 ACQUIRED HYPOTHYROIDISM: ICD-10-CM

## 2020-12-15 DIAGNOSIS — E78.5 DYSLIPIDEMIA: ICD-10-CM

## 2020-12-15 DIAGNOSIS — R41.89 COGNITIVE IMPAIRMENT: ICD-10-CM

## 2020-12-15 DIAGNOSIS — I10 ESSENTIAL HYPERTENSION: Primary | ICD-10-CM

## 2020-12-15 DIAGNOSIS — F34.1 DYSTHYMIA: ICD-10-CM

## 2020-12-15 PROCEDURE — 1090F PRES/ABSN URINE INCON ASSESS: CPT | Performed by: INTERNAL MEDICINE

## 2020-12-15 PROCEDURE — G9717 DOC PT DX DEP/BP F/U NT REQ: HCPCS | Performed by: INTERNAL MEDICINE

## 2020-12-15 PROCEDURE — 3288F FALL RISK ASSESSMENT DOCD: CPT | Performed by: INTERNAL MEDICINE

## 2020-12-15 PROCEDURE — G8427 DOCREV CUR MEDS BY ELIG CLIN: HCPCS | Performed by: INTERNAL MEDICINE

## 2020-12-15 PROCEDURE — 99214 OFFICE O/P EST MOD 30 MIN: CPT | Performed by: INTERNAL MEDICINE

## 2020-12-15 PROCEDURE — 1100F PTFALLS ASSESS-DOCD GE2>/YR: CPT | Performed by: INTERNAL MEDICINE

## 2020-12-15 NOTE — PROGRESS NOTES
HISTORY OF PRESENT ILLNESS  Court Sons is a 80 y.o. female who is present, aware, and consenting for real-time synchronous virtual video visit through [employer]. HPI  Hypertension ROS: taking medications as instructed, no medication side effects noted, no TIA's, no chest pain on exertion, no dyspnea on exertion, no swelling of ankles. New concerns: Continues on amlodipine and losartan. She notes that she is not taking HCTZ. Hyperlipidemia:  Cardiovascular risk analysis - 80 y.o. female LDL goal is under 130. ROS: no TIA's, no chest pain on exertion, no dyspnea on exertion, no swelling of ankles. New concerns: Pt's last LDL was 144 on 5/20/19. Continues on CoQ10.     hypothyroidism. Lab Results   Component Value Date/Time    TSH 0.14 (L) 11/25/2019 11:51 AM     Thyroid ROS: denies fatigue, weight changes, heat/cold intolerance, bowel/skin changes or CVS symptoms. Continues on [place] Thyroid. Mood: Stable, pt continues to comply with Zoloft. Pt reports that she is sleeping very well for 2 weeks which she attributes to sleepy time tea and melatonin. She notes that she is overall feels very well with how she is feeling. Cognitive Impairment: Pt reports that her memory is worsening but it is not impeding on her daily life. She notes that she is in a class/group to help with memory. Pt reports that she is UTD with flu shot. Review of Systems   All other systems reviewed and are negative. Physical Exam  Vitals signs reviewed. Constitutional:       General: She is not in acute distress. Appearance: Normal appearance. She is not ill-appearing, toxic-appearing or diaphoretic. HENT:      Right Ear: Hearing normal.      Left Ear: Hearing normal.      Nose: Nose normal.      Mouth/Throat:      Mouth: Mucous membranes are moist.      Pharynx: Oropharynx is clear. Eyes:      Conjunctiva/sclera: Conjunctivae normal.   Neck:      Musculoskeletal: Normal range of motion.    Pulmonary: Effort: No respiratory distress. Breath sounds: Normal air entry. Musculoskeletal: Normal range of motion. Skin:     General: Skin is warm and dry. Neurological:      General: No focal deficit present. Mental Status: She is alert and oriented to person, place, and time. Mental status is at baseline. Psychiatric:         Mood and Affect: Mood normal.         Behavior: Behavior normal.         Thought Content: Thought content normal.         Judgment: Judgment normal.         ASSESSMENT and PLAN  Diagnoses and all orders for this visit:    1. Essential hypertension  BP is at goal. I do not recommend any change in medications. 2. Acquired hypothyroidism  Thyroid stable. I do not recommend a change in medications. 3. Dysthymia  Stable and well-managed. No change in medications. 4. Dyslipidemia  Stable, patient is tolerating medications, no myalgias. I do not recommend any change in medications. 5. Cognitive impairment  Stable and well-managed. No change in management plan. Pt feels that she does not need medication at the moment to manage her memory. Will continue to monitor for improvements or changes. Lab results and schedule of future lab studies reviewed with patient. Reviewed diet, exercise and weight control. Written by Bridger Branham, as dictated by Flori Mccollum MD.     Current diagnosis and concerns discussed with pt at length. Understands risks and benefits or current treatment plan and medications and accepts the treatment and medication with any possible risks. Pt asks appropriate questions which were answered. Pt instructed to call with any concerns or problems.

## 2020-12-29 ENCOUNTER — TELEPHONE (OUTPATIENT)
Dept: INTERNAL MEDICINE CLINIC | Age: 85
End: 2020-12-29

## 2020-12-29 NOTE — TELEPHONE ENCOUNTER
Spoke with patient and she states that she has not taken her BP today but the last two days her systolic BP was 573 and 317. She was feeling dizzy and her family member recommended that she increase her fluid intake, which she did and she now feels better. Advised pt to continue to monitor her BP and let us know if it continues to be elevated. Pt understood and was thankful for the call.

## 2020-12-29 NOTE — TELEPHONE ENCOUNTER
----- Message from Demetrio Cazares sent at 12/28/2020  4:25 PM EST -----  Regarding: Tera Taylor/Telephone  Level 1/Escalated Issue      Caller's first and last name and relationship (if not the patient):      Best contact number(s): [phone]      What are the symptoms: dizziness and high BP      Transfer successful - yes/no (include outcome): No answer      Transfer declined - yes/no (include reason): No answer       Was caller advised to seek appropriate level of care - yes/no: yes       Details to clarify the request: patient is experiencing high BP and dizziness         Demetrio Cazares

## 2020-12-31 DIAGNOSIS — I10 ESSENTIAL HYPERTENSION: ICD-10-CM

## 2021-01-02 RX ORDER — LOSARTAN POTASSIUM 100 MG/1
TABLET ORAL
Qty: 90 TAB | Refills: 0 | Status: SHIPPED | OUTPATIENT
Start: 2021-01-02 | End: 2021-04-06

## 2021-01-02 RX ORDER — AMLODIPINE BESYLATE 2.5 MG/1
TABLET ORAL
Qty: 30 TAB | Refills: 0 | Status: SHIPPED | OUTPATIENT
Start: 2021-01-02 | End: 2021-02-09

## 2021-01-18 ENCOUNTER — TELEPHONE (OUTPATIENT)
Dept: INTERNAL MEDICINE CLINIC | Age: 86
End: 2021-01-18

## 2021-01-18 NOTE — TELEPHONE ENCOUNTER
Patient called requesting nurse please return her call to confirm she has not received a shingles vaccine in the past. Please advise patient if PCP recommends she receive vaccine. Please call to advise.

## 2021-01-19 NOTE — TELEPHONE ENCOUNTER
Spoke with patient and advised her that Dr. Ria Rosado recommends that she receive her COVID-19 vaccines prior to getting the Shingrix. Pt states that she received her first COVID vaccine on 1/14/21 and will get the second one on 2/4/21. Advised pt to wait at least a month after second COVID vaccine to start the shingrix series. Pt understood and was thankful for the call.

## 2021-02-07 RX ORDER — THYROID, PORCINE 90 MG/1
TABLET ORAL
Qty: 90 TAB | Refills: 0 | Status: SHIPPED | OUTPATIENT
Start: 2021-02-07 | End: 2021-03-29

## 2021-02-07 RX ORDER — SERTRALINE HYDROCHLORIDE 50 MG/1
TABLET, FILM COATED ORAL
Qty: 90 TAB | Refills: 0 | Status: SHIPPED | OUTPATIENT
Start: 2021-02-07 | End: 2021-05-05

## 2021-02-08 ENCOUNTER — TELEPHONE (OUTPATIENT)
Dept: INTERNAL MEDICINE CLINIC | Age: 86
End: 2021-02-08

## 2021-02-08 NOTE — TELEPHONE ENCOUNTER
Pt called about an elevated BP and was sent through to the office via [employer], however the transfer failed and call was lost.  Attempted to call pt but she did not answer either phone number. Pt called c/o dizziness and feeling floating around 180s-170s. Provided virtual appointment 2/9/21 at 9:45am.  Pt understood and was thankful for the call.

## 2021-02-09 ENCOUNTER — VIRTUAL VISIT (OUTPATIENT)
Dept: INTERNAL MEDICINE CLINIC | Age: 86
End: 2021-02-09
Payer: MEDICARE

## 2021-02-09 DIAGNOSIS — I10 ESSENTIAL HYPERTENSION: ICD-10-CM

## 2021-02-09 DIAGNOSIS — E78.5 DYSLIPIDEMIA: ICD-10-CM

## 2021-02-09 DIAGNOSIS — F34.1 DYSTHYMIA: ICD-10-CM

## 2021-02-09 DIAGNOSIS — Z00.00 MEDICARE ANNUAL WELLNESS VISIT, SUBSEQUENT: Primary | ICD-10-CM

## 2021-02-09 DIAGNOSIS — R42 LIGHTHEADED: ICD-10-CM

## 2021-02-09 DIAGNOSIS — E03.9 ACQUIRED HYPOTHYROIDISM: ICD-10-CM

## 2021-02-09 PROCEDURE — G8419 CALC BMI OUT NRM PARAM NOF/U: HCPCS | Performed by: INTERNAL MEDICINE

## 2021-02-09 PROCEDURE — 99214 OFFICE O/P EST MOD 30 MIN: CPT | Performed by: INTERNAL MEDICINE

## 2021-02-09 PROCEDURE — G8427 DOCREV CUR MEDS BY ELIG CLIN: HCPCS | Performed by: INTERNAL MEDICINE

## 2021-02-09 PROCEDURE — 1100F PTFALLS ASSESS-DOCD GE2>/YR: CPT | Performed by: INTERNAL MEDICINE

## 2021-02-09 PROCEDURE — 1090F PRES/ABSN URINE INCON ASSESS: CPT | Performed by: INTERNAL MEDICINE

## 2021-02-09 PROCEDURE — G0439 PPPS, SUBSEQ VISIT: HCPCS | Performed by: INTERNAL MEDICINE

## 2021-02-09 PROCEDURE — 3288F FALL RISK ASSESSMENT DOCD: CPT | Performed by: INTERNAL MEDICINE

## 2021-02-09 PROCEDURE — G9717 DOC PT DX DEP/BP F/U NT REQ: HCPCS | Performed by: INTERNAL MEDICINE

## 2021-02-09 PROCEDURE — G8536 NO DOC ELDER MAL SCRN: HCPCS | Performed by: INTERNAL MEDICINE

## 2021-02-09 RX ORDER — AMLODIPINE BESYLATE 5 MG/1
5 TABLET ORAL DAILY
Qty: 90 TAB | Refills: 1 | Status: SHIPPED | OUTPATIENT
Start: 2021-02-09 | End: 2021-06-18 | Stop reason: SDUPTHER

## 2021-02-09 RX ORDER — AMLODIPINE BESYLATE 5 MG/1
5 TABLET ORAL DAILY
Qty: 90 TAB | Refills: 1 | Status: SHIPPED | OUTPATIENT
Start: 2021-02-09 | End: 2021-02-09 | Stop reason: SDUPTHER

## 2021-02-09 NOTE — PROGRESS NOTES
This is the Subsequent Medicare Annual Wellness Exam, performed 12 months or more after the Initial AWV or the last Subsequent AWV    I have reviewed the patient's medical history in detail and updated the computerized patient record. Depression Risk Factor Screening:     3 most recent PHQ Screens 2/27/2020   Little interest or pleasure in doing things Not at all   Feeling down, depressed, irritable, or hopeless Not at all   Total Score PHQ 2 0       Alcohol Risk Screen    Do you average more than 1 drink per night or more than 7 drinks a week:  No    On any one occasion in the past three months have you have had more than 3 drinks containing alcohol:  No        Functional Ability and Level of Safety:    Hearing: The patient wears hearing aids. Activities of Daily Living: The home contains: she does use a cane  Patient needs help with:  phone, transportation, shopping and preparing meals      Ambulation: with mild difficulty     Fall Risk:  Fall Risk Assessment, last 12 mths 2/25/2020   Able to walk? Yes   Fall in past 12 months? Yes   Number of falls in past 12 months 2   Fall with injury? 0      Abuse Screen:  Patient is not abused       Cognitive Screening    Has your family/caregiver stated any concerns about your memory: yes - she has a hard time remembering names,     Cognitive Screening: working with therapist for memory    Assessment/Plan   Education and counseling provided:  Are appropriate based on today's review and evaluation    Diagnoses and all orders for this visit:    1. Essential hypertension    2. Acquired hypothyroidism    3. Dysthymia    4.  Dyslipidemia        Health Maintenance Due     Health Maintenance Due   Topic Date Due    Bone Densitometry (Dexa) Screening  09/24/1998    Shingrix Vaccine Age 50> (2 of 2) 06/28/2018    Medicare Yearly Exam  01/02/2021    GLAUCOMA SCREENING Q2Y  01/04/2021    COVID-19 Vaccine (2 of 2 - Pfizer series) 02/04/2021       Patient Care Team Patient Care Team:  Everardo Jaramillo MD as PCP - General (Internal Medicine)  Everardo Jaramillo MD as PCP - [facility]    History     Patient Active Problem List   Diagnosis Code    Essential hypertension I10    Dysthymia F34.1    Dyslipidemia E78.5    Acquired hypothyroidism E03.9    Acute cystitis N30.00    Atherosclerosis of aorta ([facility] [address] I70.0    Complete tear of rotator cuff M75.120    Female stress incontinence N39.3    GERD (gastroesophageal reflux disease) K21.9    Hx of colonic polyp Z86.010    Hx of shoulder surgery Z98.890    Hx of syncope Z87.898    Hypercholesterolemia E78.00    Astigmatism H52.209    Impaired fasting glucose R73.01    Incomplete bladder emptying R33.9    Knee pain M25.569    Lumbar spondylosis M47.816    Morbid (severe) obesity due to excess calories (HCC) E66.01    Overactive bladder N32.81    Peripheral neuropathy G62.9    Personal history of other malignant neoplasm of skin Z85.828    Presbyopia H52.4    Recurrent major depression in complete remission ([facility] [address] F33.42    Osteoporosis M81.0    Supraventricular tachycardia, paroxysmal (HCC) I47.1    Urge incontinence N39.41    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    Vitamin D deficiency E55.9     Past Medical History:   Diagnosis Date    Depression     Essential hypertension     Hypercholesterolemia     Thyroid disease     hypothyroid    Thyroid disease       Past Surgical History:   Procedure Laterality Date    HX CATARACT REMOVAL Bilateral     HX DILATION AND CURETTAGE      more than once-can't remember    HX ORTHOPAEDIC Bilateral     sena's neuroma    HX ORTHOPAEDIC Right     Right finger     HX ROTATOR CUFF REPAIR Right     HX TONSILLECTOMY      HX UROLOGICAL      \"bladder lift\"     Current Outpatient Medications   Medication Sig Dispense Refill    sertraline (ZOLOFT) 50 mg tablet TAKE 1 TABLET BY MOUTH EVERY DAY 90 Tab 0    [place] Thyroid 90 mg tablet TAKE 1 TABLET BY MOUTH ONCE DAILY 90 Tab 0    amLODIPine (NORVASC) 2.5 mg tablet TAKE ONE TABLET BY MOUTH NIGHTLY 30 Tab 0    losartan (COZAAR) 100 mg tablet TAKE ONE TABLET BY MOUTH EVERY DAY 90 Tab 0    cholecalciferol (VITAMIN D3) 1,000 unit cap Take  by mouth daily.  KRILL OIL PO Take 1 Cap by mouth daily.  cyanocobalamin, vitamin B-12, 2,500 mcg tab Take  by mouth.  melatonin 3 mg tablet Take 3 mg by mouth nightly. Allergies   Allergen Reactions    Demerol [Meperidine] Other (comments)     Doesn't remember but states reaction was when she had first child    Pcn [Penicillins] Hives       Family History   Problem Relation Age of Onset    Stroke Mother     Cancer Father         Brain & Lung    Diabetes Sister     Stroke Sister     Diabetes Brother     Diabetes Maternal Grandmother     Cancer Sister     Heart Disease Brother      Social History     Tobacco Use    Smoking status: Never Smoker    Smokeless tobacco: Never Used   Substance Use Topics    Alcohol use: No        Starr, who was evaluated through a synchronous (real-time) audio-video encounter, and/or her healthcare decision maker, is aware that it is a billable service, with coverage as determined by her insurance carrier. She provided verbal consent to proceed: Yes, and patient identification was verified. It was conducted pursuant to the emergency declaration under the [address] [place] [address] authority and the [employer] Act. A caregiver was present when appropriate. Ability to conduct physical exam was limited. I was in the office. The patient was at home.     Opal Wei MD

## 2021-02-09 NOTE — PROGRESS NOTES
Sade Guzman (: 1933) is a 80 y.o. female, established patient, here for evaluation of the following chief complaint(s):   Hypertension       ASSESSMENT/PLAN:  1. Medicare annual wellness visit, subsequent    2. Essential hypertension  Not at goal. BP is not at goal with elevated values at home. Increased pt's amlodipine to 5 mg. Informed pt that vertigo or dizziness could affect ability to walk and could not be related to BP. Pt reports that her faint feeling did not feel vertigo related. Asked pt to have nurses in her nursing home to monitor her BP since pt's cuff could be off. Will continue to monitor for improvements or changes. -     amLODIPine (NORVASC) 5 mg tablet; Take 1 Tab by mouth daily. , Normal, Disp-90 Tab, R-1She needs it delivered to her!!!    3. Acquired hypothyroidism  Thyroid stable. I do not recommend a change in medications. 4. Dysthymia  Stable and well-managed. No change in medications. 5. Dyslipidemia  Stable and well-managed. 6. Lightheaded  Not at goal. BP is not at goal with elevated values at home. Increased pt's amlodipine to 5 mg. Informed pt that vertigo or dizziness could affect ability to walk and could not be related to BP. Pt reports that her faint feeling did not feel vertigo related. Asked pt to have nurses in her nursing home to monitor her BP since pt's cuff could be off. Will continue to monitor for improvements or changes. Memory  Not at goal. Informed pt that hearing can affect memory. Recommended pt have her hearing checked. Will continue to monitor for improvements or changes. Balance   Not at goal. Directed pt to start doing balance exercises once her nursing home offers them again. Will continue to monitor for improvements or changes. SUBJECTIVE/OBJECTIVE:  HPI  Pt reports that yesterday she felt that her head was spinning and had difficulty walking due to feeling faint.  She notes that her BP has been ranging 374-325 systolic systolic on Jan and Feb. She suspects that she may have had a reaction to COVID vaccine 2/4/2. Pt reports that she has been drinking adequate amount of water. Denies eating more sodium heavy foods. She notes that she feels much better today. Hyperlipidemia:  Cardiovascular risk analysis - 80 y.o. female LDL goal is under 130. ROS: no TIA's, no chest pain on exertion, no dyspnea on exertion, no swelling of ankles. Mood: Stable, pt continues to comply with Zoloft. Memory: Pt reports that her memory has worsened. She notes that she is in therapy for it, but does not think it is helping. She states that she can no longer remember names and loses things in her apartment often.     hypothyroidism. Lab Results   Component Value Date/Time    TSH 0.14 (L) 11/25/2019 11:51 AM     Thyroid ROS: denies fatigue, weight changes, heat/cold intolerance, bowel/skin changes or CVS symptoms. Pt reports that her balance has decreased. She notes that she walks with a cane. Review of Systems   All other systems reviewed and are negative. Patient-Reported Vitals 2/9/2021   Patient-Reported Systolic  467   Patient-Reported Diastolic 79       Physical Exam  Constitutional:       Appearance: Normal appearance. HENT:      Head: Normocephalic and atraumatic. Nose: Nose normal.      Mouth/Throat:      Mouth: Mucous membranes are moist.   Eyes:      Extraocular Movements: Extraocular movements intact. Neck:      Musculoskeletal: Normal range of motion. Pulmonary:      Effort: Pulmonary effort is normal.   Musculoskeletal: Normal range of motion. Neurological:      General: No focal deficit present. Mental Status: She is oriented to person, place, and time. Psychiatric:         Mood and Affect: Mood normal.         Behavior: Behavior normal.         Thought Content:  Thought content normal.         Judgment: Judgment normal.             Mariya Pacheco is being evaluated by a Virtual Visit (video visit) encounter to address concerns as mentioned above. A caregiver was present when appropriate. Due to this being a TeleHealth encounter (During KXOTX-02 public health emergency), evaluation of the following organ systems was limited: Vitals/Constitutional/EENT/Resp/CV/GI//MS/Neuro/Skin/Heme-Lymph-Imm. Pursuant to the emergency declaration under the [address] [place] [address] and the [employer] Act, this Virtual Visit was conducted with patient's (and/or legal guardian's) consent, to reduce the patient's risk of exposure to COVID-19 and provide necessary medical care. The patient (and/or legal guardian) has also been advised to contact this office for worsening conditions or problems, and seek emergency medical treatment and/or call 911 if deemed necessary. Patient identification was verified at the start of the visit: YES    Services were provided through a video synchronous discussion virtually to substitute for in-person clinic visit. Patient was located at home and provider was located in office or at home. An electronic signature was used to authenticate this note.   -- Edmar Martell

## 2021-02-09 NOTE — PATIENT INSTRUCTIONS
Medicare Wellness Visit, Female     The best way to live healthy is to have a lifestyle where you eat a well-balanced diet, exercise regularly, limit alcohol use, and quit all forms of tobacco/nicotine, if applicable. Regular preventive services are another way to keep healthy. Preventive services (vaccines, screening tests, monitoring & exams) can help personalize your care plan, which helps you manage your own care. Screening tests can find health problems at the earliest stages, when they are easiest to treat. [facility] follows the current, evidence-based guidelines published by the [facility] Terrance Bullock ([facility]) when recommending preventive services for our patients. Because we follow these guidelines, sometimes recommendations change over time as research supports it. (For example, mammograms used to be recommended annually. Even though Medicare will still pay for an annual mammogram, the newer guidelines recommend a mammogram every two years for women of average risk). Of course, you and your doctor may decide to screen more often for some diseases, based on your risk and your co-morbidities (chronic disease you are already diagnosed with). Preventive services for you include:  - Medicare offers their members a free annual wellness visit, which is time for you and your primary care provider to discuss and plan for your preventive service needs. Take advantage of this benefit every year!  -All adults over the age of 72 should receive the recommended pneumonia vaccines. Current USPSTF guidelines recommend a series of two vaccines for the best pneumonia protection.   -All adults should have a flu vaccine yearly and a tetanus vaccine every 10 years.   -All adults age 48 and older should receive the shingles vaccines (series of two vaccines).       -All adults age 38-68 who are overweight should have a diabetes screening test once every three years.   -All adults born between [birth year] and 1965 should be screened once for Hepatitis C.  -Other screening tests and preventive services for persons with diabetes include: an eye exam to screen for diabetic retinopathy, a kidney function test, a foot exam, and stricter control over your cholesterol.   -Cardiovascular screening for adults with routine risk involves an electrocardiogram (ECG) at intervals determined by your doctor.   -Colorectal cancer screenings should be done for adults age 54-65 with no increased risk factors for colorectal cancer. There are a number of acceptable methods of screening for this type of cancer. Each test has its own benefits and drawbacks. Discuss with your doctor what is most appropriate for you during your annual wellness visit. The different tests include: colonoscopy (considered the best screening method), a fecal occult blood test, a fecal DNA test, and sigmoidoscopy.    -A bone mass density test is recommended when a woman turns 65 to screen for osteoporosis. This test is only recommended one time, as a screening. Some providers will use this same test as a disease monitoring tool if you already have osteoporosis. -Breast cancer screenings are recommended every other year for women of normal risk, age 54-69.  -Cervical cancer screenings for women over age 72 are only recommended with certain risk factors.      Here is a list of your current Health Maintenance items (your personalized list of preventive services) with a due date:  Health Maintenance Due   Topic Date Due    Bone Mineral Density   09/24/1998    Shingles Vaccine (2 of 2) 06/28/2018    Annual Well Visit  01/02/2021    Glaucoma Screening   01/04/2021    COVID-19 Vaccine (2 of 2 - Pfizer series) 02/04/2021

## 2021-03-12 ENCOUNTER — OFFICE VISIT (OUTPATIENT)
Dept: INTERNAL MEDICINE CLINIC | Age: 86
End: 2021-03-12
Payer: MEDICARE

## 2021-03-12 ENCOUNTER — HOSPITAL ENCOUNTER (OUTPATIENT)
Dept: GENERAL RADIOLOGY | Age: 86
Discharge: HOME | End: 2021-03-12
Attending: INTERNAL MEDICINE
Payer: MEDICARE

## 2021-03-12 VITALS
TEMPERATURE: 98.3 F | HEART RATE: 75 BPM | BODY MASS INDEX: 24.99 KG/M2 | WEIGHT: 150 LBS | HEIGHT: 65 IN | DIASTOLIC BLOOD PRESSURE: 46 MMHG | RESPIRATION RATE: 20 BRPM | OXYGEN SATURATION: 97 % | SYSTOLIC BLOOD PRESSURE: 93 MMHG

## 2021-03-12 DIAGNOSIS — R53.83 FATIGUE, UNSPECIFIED TYPE: ICD-10-CM

## 2021-03-12 DIAGNOSIS — R42 DIZZINESS: ICD-10-CM

## 2021-03-12 DIAGNOSIS — R07.9 CHEST PAIN, UNSPECIFIED TYPE: ICD-10-CM

## 2021-03-12 DIAGNOSIS — R07.9 CHEST PAIN, UNSPECIFIED TYPE: Primary | ICD-10-CM

## 2021-03-12 PROCEDURE — 71046 X-RAY EXAM CHEST 2 VIEWS: CPT

## 2021-03-12 PROCEDURE — 1101F PT FALLS ASSESS-DOCD LE1/YR: CPT | Performed by: INTERNAL MEDICINE

## 2021-03-12 PROCEDURE — 1090F PRES/ABSN URINE INCON ASSESS: CPT | Performed by: INTERNAL MEDICINE

## 2021-03-12 PROCEDURE — G9717 DOC PT DX DEP/BP F/U NT REQ: HCPCS | Performed by: INTERNAL MEDICINE

## 2021-03-12 PROCEDURE — 93010 ELECTROCARDIOGRAM REPORT: CPT | Performed by: INTERNAL MEDICINE

## 2021-03-12 PROCEDURE — G8427 DOCREV CUR MEDS BY ELIG CLIN: HCPCS | Performed by: INTERNAL MEDICINE

## 2021-03-12 PROCEDURE — G8420 CALC BMI NORM PARAMETERS: HCPCS | Performed by: INTERNAL MEDICINE

## 2021-03-12 PROCEDURE — G8536 NO DOC ELDER MAL SCRN: HCPCS | Performed by: INTERNAL MEDICINE

## 2021-03-12 PROCEDURE — 99214 OFFICE O/P EST MOD 30 MIN: CPT | Performed by: INTERNAL MEDICINE

## 2021-03-12 NOTE — PROGRESS NOTES
Assessment and Plan   Diagnoses and all orders for this visit:    1. Chest pain, unspecified type  -     AMB POC EKG ROUTINE W/ 12 LEADS, INTER & REP  -     XR CHEST PA LAT; Future  -     LIPASE; Future  2. Fatigue, unspecified type  -     METABOLIC PANEL, COMPREHENSIVE; Future  -     CBC WITH AUTOMATED DIFF; Future  -     TSH 3RD GENERATION; Future  -     VITAMIN B12; Future  3. Dizziness  History of SVT, thyroid, hypertension, neuropathy, depression    Presents to clinic for several complaints. Reports \"balance\" problems and her head \"floating around and feeling woozy and feels like going in circles. \"  Reports this lasted for several days last week then improved thing came back again. Reports yesterday she was standing and talking to someone when she almost fainted and had to lay down. Denies any ear pain, tinnitus, sinus congestion or drainage. Also reports intermittent lower chest pain/epigastric pain that is sharp in character and radiates to her back. No aggravating or alleviating factors. Has noticed increased swallowing problems and feels like things aren't going down like they should and feels like some things are coming back up. Has recently had issues with her blood pressure being more elevated and so her amlodipine was recently increased. Has also noticed a cough for the past month and fatigue and lack of energy. Noted \"vision changes\"    In summary, she is presenting with presyncope symptoms, chest pain, swallowing problems, cough, and fatigue. EKG with normal sinus rhythm. Blood pressure did drop 20 points from laying to a sitting position using blood pressures in her left arm. However her blood pressure only dropped by 10 points in her right arm. Difference between left and right arm less than 20. Cardiac, pulm, HEENT, and neuro exam normal.  Zeferino-hallpike negative. Of note, she mentioned dizziness and balance issues during her visit in February. Plan:   Wide differential. Labs ordered. Recommend chest x-ray to rule out acute causes. Lipase to rule out pancreatitis. May need to go back down on her blood pressure medication. Unsure if all of her symptoms are related to one thing or multiple etiologies. Benefits, risks, possible drug interactions, and side effects of all new medications were reviewed with the patient. Pt verbalized understanding. Return to clinic:  Next week as scheduled    An electronic signature was used to authenticate this note. Patricia Mccloud MD  [facility]  3/12/2021    Future Appointments   Date Time Provider Kuldip Calles   3/12/2021  4:15 PM [facility] XR 1 [facility] [place]   5/33/9070 79:40 AM Dolores Rivera MD [facility] BS AMB        Subjective   Chief Complaint   Greer Baker is a 80 y.o. female           Objective   Vitals:       Visit Vitals  BP (!) 93/46   Pulse 75   Temp 98.3 °F (36.8 °C) (Oral)   Resp 20   Ht 5' 5\" (1.651 m)   Wt 150 lb (68 kg)   SpO2 97%   BMI 24.96 kg/m²      Laying L 128/68  Sitting L 107/63  Sitting R 118/67  Standing L 114/59    Physical Exam  Constitutional:       General: She is not in acute distress. Appearance: She is well-developed. HENT:      Right Ear: Tympanic membrane, ear canal and external ear normal.      Left Ear: Ear canal and external ear normal.      Ears:      Comments: Slight bulge of L TM but no erythema or effusion noted  Eyes:      Extraocular Movements: Extraocular movements intact. Conjunctiva/sclera: Conjunctivae normal.   Neck:      Musculoskeletal: Neck supple. Cardiovascular:      Rate and Rhythm: Normal rate and regular rhythm. Pulses: Normal pulses. Heart sounds: No murmur. No friction rub. No gallop. Pulmonary:      Effort: No respiratory distress. Breath sounds: No wheezing, rhonchi or rales. Abdominal:      General: Bowel sounds are normal. There is no distension.       Palpations: Abdomen is soft. There is no hepatomegaly, splenomegaly or mass. Tenderness: There is no abdominal tenderness. There is no guarding. Skin:     General: Skin is warm. Findings: No rash. Neurological:      Mental Status: She is alert and oriented to person, place, and time. Cranial Nerves: No cranial nerve deficit. Sensory: No sensory deficit. Motor: No weakness. Coordination: Coordination normal.      Gait: Gait normal.          Current Outpatient Medications   Medication Sig    amLODIPine (NORVASC) 5 mg tablet Take 1 Tab by mouth daily.  sertraline (ZOLOFT) 50 mg tablet TAKE 1 TABLET BY MOUTH EVERY DAY    [place] Thyroid 90 mg tablet TAKE 1 TABLET BY MOUTH ONCE DAILY    losartan (COZAAR) 100 mg tablet TAKE ONE TABLET BY MOUTH EVERY DAY    cholecalciferol (VITAMIN D3) 1,000 unit cap Take  by mouth daily.  KRILL OIL PO Take 1 Cap by mouth daily.  cyanocobalamin, vitamin B-12, 2,500 mcg tab Take  by mouth.  melatonin 3 mg tablet Take 3 mg by mouth nightly. No current facility-administered medications for this visit.

## 2021-03-13 LAB
ALBUMIN SERPL-MCNC: 3.6 G/DL (ref 3.5–5)
ALBUMIN/GLOB SERPL: 1.2 {RATIO} (ref 1.1–2.2)
ALP SERPL-CCNC: 79 U/L (ref 45–117)
ALT SERPL-CCNC: 24 U/L (ref 12–78)
ANION GAP SERPL CALC-SCNC: 7 MMOL/L (ref 5–15)
AST SERPL-CCNC: 14 U/L (ref 15–37)
BASOPHILS # BLD: 0.1 K/UL (ref 0–0.1)
BASOPHILS NFR BLD: 1 % (ref 0–1)
BILIRUB SERPL-MCNC: 0.2 MG/DL (ref 0.2–1)
BUN SERPL-MCNC: 31 MG/DL (ref 6–20)
BUN/CREAT SERPL: 41 (ref 12–20)
CALCIUM SERPL-MCNC: 9.1 MG/DL (ref 8.5–10.1)
CHLORIDE SERPL-SCNC: 109 MMOL/L (ref 97–108)
CO2 SERPL-SCNC: 25 MMOL/L (ref 21–32)
CREAT SERPL-MCNC: 0.75 MG/DL (ref 0.55–1.02)
DIFFERENTIAL METHOD BLD: ABNORMAL
EOSINOPHIL # BLD: 0.2 K/UL (ref 0–0.4)
EOSINOPHIL NFR BLD: 2 % (ref 0–7)
ERYTHROCYTE [DISTWIDTH] IN BLOOD BY AUTOMATED COUNT: 12 % (ref 11.5–14.5)
GLOBULIN SER CALC-MCNC: 3 G/DL (ref 2–4)
GLUCOSE SERPL-MCNC: 86 MG/DL (ref 65–100)
HCT VFR BLD AUTO: 42.2 % (ref 35–47)
HGB BLD-MCNC: 13.4 G/DL (ref 11.5–16)
IMM GRANULOCYTES # BLD AUTO: 0 K/UL (ref 0–0.04)
IMM GRANULOCYTES NFR BLD AUTO: 0 % (ref 0–0.5)
LIPASE SERPL-CCNC: 148 U/L (ref 73–393)
LYMPHOCYTES # BLD: 3 K/UL (ref 0.8–3.5)
LYMPHOCYTES NFR BLD: 27 % (ref 12–49)
MCH RBC QN AUTO: 30.7 PG (ref 26–34)
MCHC RBC AUTO-ENTMCNC: 31.8 G/DL (ref 30–36.5)
MCV RBC AUTO: 96.6 FL (ref 80–99)
MONOCYTES # BLD: 0.9 K/UL (ref 0–1)
MONOCYTES NFR BLD: 8 % (ref 5–13)
NEUTS SEG # BLD: 7 K/UL (ref 1.8–8)
NEUTS SEG NFR BLD: 62 % (ref 32–75)
NRBC # BLD: 0 K/UL (ref 0–0.01)
NRBC BLD-RTO: 0 PER 100 WBC
PLATELET # BLD AUTO: 204 K/UL (ref 150–400)
PMV BLD AUTO: 12.5 FL (ref 8.9–12.9)
POTASSIUM SERPL-SCNC: 4.2 MMOL/L (ref 3.5–5.1)
PROT SERPL-MCNC: 6.6 G/DL (ref 6.4–8.2)
RBC # BLD AUTO: 4.37 M/UL (ref 3.8–5.2)
SODIUM SERPL-SCNC: 141 MMOL/L (ref 136–145)
TSH SERPL DL<=0.05 MIU/L-ACNC: 0.47 UIU/ML (ref 0.36–3.74)
VIT B12 SERPL-MCNC: 929 PG/ML (ref 193–986)
WBC # BLD AUTO: 11.1 K/UL (ref 3.6–11)

## 2021-03-17 ENCOUNTER — OFFICE VISIT (OUTPATIENT)
Dept: INTERNAL MEDICINE CLINIC | Age: 86
End: 2021-03-17
Payer: MEDICARE

## 2021-03-17 VITALS
BODY MASS INDEX: 23.32 KG/M2 | WEIGHT: 140 LBS | DIASTOLIC BLOOD PRESSURE: 62 MMHG | HEART RATE: 69 BPM | SYSTOLIC BLOOD PRESSURE: 132 MMHG | OXYGEN SATURATION: 99 % | HEIGHT: 65 IN | TEMPERATURE: 97.8 F | RESPIRATION RATE: 20 BRPM

## 2021-03-17 DIAGNOSIS — J30.9 ALLERGIC RHINITIS, UNSPECIFIED SEASONALITY, UNSPECIFIED TRIGGER: Primary | ICD-10-CM

## 2021-03-17 DIAGNOSIS — R05.9 COUGH: ICD-10-CM

## 2021-03-17 DIAGNOSIS — R13.10 DYSPHAGIA, UNSPECIFIED TYPE: ICD-10-CM

## 2021-03-17 PROCEDURE — G8427 DOCREV CUR MEDS BY ELIG CLIN: HCPCS | Performed by: INTERNAL MEDICINE

## 2021-03-17 PROCEDURE — 99214 OFFICE O/P EST MOD 30 MIN: CPT | Performed by: INTERNAL MEDICINE

## 2021-03-17 PROCEDURE — G0463 HOSPITAL OUTPT CLINIC VISIT: HCPCS | Performed by: INTERNAL MEDICINE

## 2021-03-17 PROCEDURE — 1090F PRES/ABSN URINE INCON ASSESS: CPT | Performed by: INTERNAL MEDICINE

## 2021-03-17 PROCEDURE — G8536 NO DOC ELDER MAL SCRN: HCPCS | Performed by: INTERNAL MEDICINE

## 2021-03-17 PROCEDURE — 1101F PT FALLS ASSESS-DOCD LE1/YR: CPT | Performed by: INTERNAL MEDICINE

## 2021-03-17 PROCEDURE — G9717 DOC PT DX DEP/BP F/U NT REQ: HCPCS | Performed by: INTERNAL MEDICINE

## 2021-03-17 PROCEDURE — G8420 CALC BMI NORM PARAMETERS: HCPCS | Performed by: INTERNAL MEDICINE

## 2021-03-17 RX ORDER — FLUTICASONE PROPIONATE 50 MCG
2 SPRAY, SUSPENSION (ML) NASAL DAILY
Qty: 1 BOTTLE | Refills: 3 | Status: SHIPPED | OUTPATIENT
Start: 2021-03-17

## 2021-03-17 RX ORDER — CETIRIZINE HCL 10 MG
10 TABLET ORAL
Qty: 30 TAB | Refills: 5 | Status: SHIPPED | OUTPATIENT
Start: 2021-03-17

## 2021-03-17 NOTE — PROGRESS NOTES
Assessment and Plan   Diagnoses and all orders for this visit:    1. Allergic rhinitis, unspecified seasonality, unspecified trigger  -     fluticasone propionate (FLONASE) 50 mcg/actuation nasal spray; 2 Sprays by Both Nostrils route daily. -     cetirizine (ZYRTEC) 10 mg tablet; Take 1 Tab by mouth daily as needed for Allergies. 2. Cough  3. Dysphagia, unspecified type  Follow-up from last visit. cxr negative  B12 normal.  TSH normal.  WBC 11.1. CBC otherwise normal.  Normal absolute counts. CMP normal.  Lipase normal.    Reports that she is really only here because her physical therapist encouraged her to come in but she is not very distressed about her symptoms. She is accompanied by her son who says her main complaint has been the \"woozy\" feeling that she has been having. She describes it as her head feeling full. No sinus pressure. Wonder if had symptoms are related to allergic rhinitis which would explain her sinus drainage, cough, and head fullness. Therefore, recommend treating with Flonase and Zyrtec. Advised to take Zyrtec at night due to sedation, which she says is a plus for her. As far as her chest pain and swallowing problems, I wonder if this is reflux. No fever, chills, unexpected weight loss, night sweats. Chest x-ray was normal.  Discussed that perhaps her symptoms are related to reflux but she feels the symptoms do not bother her enough to want to take medications for it. Recommend continuing to monitor. Could consider GERD medications or a barium swallow if symptoms progress or do not improve     Benefits, risks, possible drug interactions, and side effects of all new medications were reviewed with the patient. Pt verbalized understanding. Return to clinic: As needed    An electronic signature was used to authenticate this note. Maryam Chavarria MD  [facility]  3/17/2021    No future appointments.    On this date 03/17/2021 I have spent 30 minutes reviewing previous notes, test results and face to face with the patient discussing the diagnosis and importance of compliance with the treatment plan as well as documenting on the day of the visit. Subjective   Chief Complaint   Lab follow-up    Maxine Young is a 80 y.o. female           Objective   Vitals:       Visit Vitals  /62   Pulse 69   Temp 97.8 °F (36.6 °C) (Oral)   Resp 20   Ht 5' 5\" (1.651 m)   Wt 140 lb (63.5 kg)   SpO2 99%   BMI 23.30 kg/m²        Physical Exam  Constitutional:       Appearance: Normal appearance. She is not ill-appearing. Cardiovascular:      Rate and Rhythm: Normal rate. Pulmonary:      Effort: No respiratory distress. Neurological:      Mental Status: She is alert. Current Outpatient Medications   Medication Sig    fluticasone propionate (FLONASE) 50 mcg/actuation nasal spray 2 Sprays by Both Nostrils route daily.  cetirizine (ZYRTEC) 10 mg tablet Take 1 Tab by mouth daily as needed for Allergies.  amLODIPine (NORVASC) 5 mg tablet Take 1 Tab by mouth daily.  sertraline (ZOLOFT) 50 mg tablet TAKE 1 TABLET BY MOUTH EVERY DAY    [place] Thyroid 90 mg tablet TAKE 1 TABLET BY MOUTH ONCE DAILY    losartan (COZAAR) 100 mg tablet TAKE ONE TABLET BY MOUTH EVERY DAY    cholecalciferol (VITAMIN D3) 1,000 unit cap Take  by mouth daily.  KRILL OIL PO Take 1 Cap by mouth daily.  cyanocobalamin, vitamin B-12, 2,500 mcg tab Take  by mouth.  melatonin 3 mg tablet Take 3 mg by mouth nightly. No current facility-administered medications for this visit.

## 2021-03-26 DIAGNOSIS — R41.3 MEMORY LOSS: Primary | ICD-10-CM

## 2021-03-29 RX ORDER — THYROID, PORCINE 90 MG/1
TABLET ORAL
Qty: 90 TAB | Refills: 0 | Status: SHIPPED | OUTPATIENT
Start: 2021-03-29 | End: 2021-08-14

## 2021-04-27 ENCOUNTER — OFFICE VISIT (OUTPATIENT)
Dept: NEUROLOGY | Age: 86
End: 2021-04-27
Payer: MEDICARE

## 2021-04-27 VITALS
SYSTOLIC BLOOD PRESSURE: 119 MMHG | WEIGHT: 140 LBS | OXYGEN SATURATION: 98 % | RESPIRATION RATE: 18 BRPM | HEART RATE: 78 BPM | BODY MASS INDEX: 23.32 KG/M2 | HEIGHT: 65 IN | DIASTOLIC BLOOD PRESSURE: 53 MMHG

## 2021-04-27 DIAGNOSIS — R41.3 MEMORY LOSS: Primary | ICD-10-CM

## 2021-04-27 PROCEDURE — G8536 NO DOC ELDER MAL SCRN: HCPCS | Performed by: PSYCHIATRY & NEUROLOGY

## 2021-04-27 PROCEDURE — G9717 DOC PT DX DEP/BP F/U NT REQ: HCPCS | Performed by: PSYCHIATRY & NEUROLOGY

## 2021-04-27 PROCEDURE — G8420 CALC BMI NORM PARAMETERS: HCPCS | Performed by: PSYCHIATRY & NEUROLOGY

## 2021-04-27 PROCEDURE — 99214 OFFICE O/P EST MOD 30 MIN: CPT | Performed by: PSYCHIATRY & NEUROLOGY

## 2021-04-27 PROCEDURE — 1090F PRES/ABSN URINE INCON ASSESS: CPT | Performed by: PSYCHIATRY & NEUROLOGY

## 2021-04-27 PROCEDURE — 1101F PT FALLS ASSESS-DOCD LE1/YR: CPT | Performed by: PSYCHIATRY & NEUROLOGY

## 2021-04-27 PROCEDURE — G8427 DOCREV CUR MEDS BY ELIG CLIN: HCPCS | Performed by: PSYCHIATRY & NEUROLOGY

## 2021-04-27 NOTE — PROGRESS NOTES
[facility] and [address] at [facility] at [address] [facility], [address] [facility] [address] [facility], [address] [place] [address]   [phone]              Chief Complaint   Patient presents with    Follow-up    Memory Loss     dramatically worse, would like guidance to treatment options     Current Outpatient Medications   Medication Sig Dispense Refill    losartan (COZAAR) 100 mg tablet TAKE ONE TABLET BY MOUTH EVERY DAY 90 Tab 1    [place] Thyroid 90 mg tablet TAKE 1 TABLET BY MOUTH ONCE DAILY 90 Tab 0    fluticasone propionate (FLONASE) 50 mcg/actuation nasal spray 2 Sprays by Both Nostrils route daily. 1 Bottle 3    amLODIPine (NORVASC) 5 mg tablet Take 1 Tab by mouth daily. 90 Tab 1    sertraline (ZOLOFT) 50 mg tablet TAKE 1 TABLET BY MOUTH EVERY DAY 90 Tab 0    cholecalciferol (VITAMIN D3) 1,000 unit cap Take  by mouth daily.  KRILL OIL PO Take 1 Cap by mouth daily.  cyanocobalamin, vitamin B-12, 2,500 mcg tab Take  by mouth.  cetirizine (ZYRTEC) 10 mg tablet Take 1 Tab by mouth daily as needed for Allergies. 30 Tab 5    melatonin 3 mg tablet Take 3 mg by mouth nightly. Allergies   Allergen Reactions    Demerol [Meperidine] Other (comments)     Doesn't remember but states reaction was when she had first child    Pcn [Penicillins] Hives     Social History     Tobacco Use    Smoking status: Never Smoker    Smokeless tobacco: Never Used   Substance Use Topics    Alcohol use: No    Drug use: No     40-year-old lady returns today for follow-up. 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 That was a follow-up visit where we discussed pseudodementia. We discussed that she needs to have her depression treated more effectively.   We discussed her peripheral neuropathy and we discussed that medicines we use like Neurontin and the fact she had adverse reaction to Neurontin that I would not use Lyrica to rechallenge her. We discussed that a better idea would be to transition her from Zoloft onto Cymbalta. We did discuss that perhaps she should discuss with Dr. Kalli Flores using Cymbalta would be an idea because that might help her depression and her neuropathy at the same time. She was going to discuss that with Dr. Kalli Flores. She comes today noting that she is having worsening of her confusion and memory. Record review finds she still on 50 mg of Zoloft. I did go back and review her neuropsychological evaluation again and once again the profile shows that its not consistent with dementia. She was felt to demonstrate signs where Dr. Oskar Botello felt her mood to be a bigger problem that would be readily apparent and felt this was mild cognitive impairment without memory loss but with mild to moderate major depression and anxiety. Her daughter is with her today and she notes that she and the family are seeing more progressive memory loss. She is forgetting large parts of her life. She is forgetting that she and her late  were hospitalized at the same time. She is forgetting major family reunion's that the patient planned and had at her home and that meant a lot to her. The daughter has made memory books full of pictures to remind the patient of important events in her life that she has forgotten. She has questions as to why this is pseudodementia and not something more. I took great pains to try to discuss pseudodementia and how that varies from dementia. We discussed how the objective neuropsychological testing showed that her memory actually was intact and to discuss how depression and anxiety can in fact appear in elderly people to present as memory loss and how perceived memory loss is different than objective memory loss. Her daughter notes that the patient does not appear to be depressed or anxious. It was quite interesting because . Justin Bowman then noted that every day she awakens and feels depressed and anxious. She did endorse herself that she feels depressed and anxious and that she has felt that way every day of her life which was quite surprising to her daughter. The patient's daughter did note that Dr. Maurice aPtel did try changing the Zoloft over to Cymbalta but the patient became more depressed and even was suicidal so they changed back to Zoloft. After discussion it was felt that the family is perceiving the patient is having significant decline in her cognitive functioning. We discussed that given this the question to be answered is whether the patient is actually having true memory loss i.e. becoming demented or whether she is still suffering from pseudodementia and the only way to answer that is to have objective testing once again. Several questions again surfaced about perceived memory loss versus true objective memory loss and I suggest that the only way to settle this was for the family to meet with Dr. Leah Castellano as he is really the only one who is qualified to answer those types of questions from a neuropsychological perspective. To that end they will schedule an appointment to meet with Dr. Leah Castellano to discuss their concerns. If he feels that a formal neuropsychological evaluation is in order to compare to her previous testing then he will do so. If he feels that the same issues are ongoing and are representative of pseudodementia then so be it. 30 minutes today spent in discussion as documented above    Examination  Visit Vitals  Ht 5' 5\" (1.651 m)   BMI 23.30 kg/m²     Visit Vitals  BP (!) 119/53 (BP 1 Location: Right arm, BP Patient Position: Sitting, BP Cuff Size: Adult)   Pulse 78   Resp 18   Ht 5' 5\" (1.651 m)   Wt 63.5 kg (140 lb)   SpO2 98%   BMI 23.30 kg/m²         Impression/Plan      Trinity Hayes MD        This note was created using voice recognition software. Despite editing, there may be syntax errors.

## 2021-04-27 NOTE — PROGRESS NOTES
Jose E Cobos is a 80 y.o. female  Chief Complaint   Patient presents with    Follow-up    Neuropathy     Health Maintenance Due   Topic Date Due    Bone Densitometry (Dexa) Screening  Never done    Shingrix Vaccine Age 50> (2 of 2) 06/28/2018     Visit Vitals  Ht 5' 5\" (1.651 m)   BMI 23.30 kg/m²

## 2021-05-05 RX ORDER — SERTRALINE HYDROCHLORIDE 50 MG/1
TABLET, FILM COATED ORAL
Qty: 90 TAB | Refills: 0 | Status: SHIPPED | OUTPATIENT
Start: 2021-05-05 | End: 2021-06-19

## 2021-06-17 ENCOUNTER — OFFICE VISIT (OUTPATIENT)
Dept: INTERNAL MEDICINE CLINIC | Age: 86
End: 2021-06-17
Payer: MEDICARE

## 2021-06-17 VITALS
SYSTOLIC BLOOD PRESSURE: 132 MMHG | DIASTOLIC BLOOD PRESSURE: 64 MMHG | HEIGHT: 65 IN | BODY MASS INDEX: 24.29 KG/M2 | HEART RATE: 69 BPM | RESPIRATION RATE: 16 BRPM | TEMPERATURE: 97.9 F | OXYGEN SATURATION: 96 % | WEIGHT: 145.8 LBS

## 2021-06-17 DIAGNOSIS — R41.89 COGNITIVE IMPAIRMENT: ICD-10-CM

## 2021-06-17 DIAGNOSIS — R42 EQUILIBRIUM DISORDER: ICD-10-CM

## 2021-06-17 DIAGNOSIS — F33.1 MODERATE EPISODE OF RECURRENT MAJOR DEPRESSIVE DISORDER (HCC): Primary | ICD-10-CM

## 2021-06-17 DIAGNOSIS — I10 ESSENTIAL HYPERTENSION: ICD-10-CM

## 2021-06-17 PROCEDURE — 1101F PT FALLS ASSESS-DOCD LE1/YR: CPT | Performed by: INTERNAL MEDICINE

## 2021-06-17 PROCEDURE — 1090F PRES/ABSN URINE INCON ASSESS: CPT | Performed by: INTERNAL MEDICINE

## 2021-06-17 PROCEDURE — G8420 CALC BMI NORM PARAMETERS: HCPCS | Performed by: INTERNAL MEDICINE

## 2021-06-17 PROCEDURE — G0463 HOSPITAL OUTPT CLINIC VISIT: HCPCS | Performed by: INTERNAL MEDICINE

## 2021-06-17 PROCEDURE — G9717 DOC PT DX DEP/BP F/U NT REQ: HCPCS | Performed by: INTERNAL MEDICINE

## 2021-06-17 PROCEDURE — G8427 DOCREV CUR MEDS BY ELIG CLIN: HCPCS | Performed by: INTERNAL MEDICINE

## 2021-06-17 PROCEDURE — G8536 NO DOC ELDER MAL SCRN: HCPCS | Performed by: INTERNAL MEDICINE

## 2021-06-17 PROCEDURE — 99214 OFFICE O/P EST MOD 30 MIN: CPT | Performed by: INTERNAL MEDICINE

## 2021-06-17 NOTE — PROGRESS NOTES
Delfino Jimenez (: 1933) is a 80 y.o. female, established patient, here for evaluation of the following chief complaint(s):  Dizziness       ASSESSMENT/PLAN:  Below is the assessment and plan developed based on review of pertinent history, physical exam, labs, studies, and medications. 1. Moderate episode of recurrent major depressive disorder ([address]  I think that depression has some role to play in pt's memory loss and so am referring to Dr. Eun Londono for geriatric psychiatry. For now, continue on same dosage of Zoloft. 2. Equilibrium disorder  -     MRI BRAIN W WO CONT; Future  I think this is a balance/equilibrium problem rather than dizziness. I referred her to neurology (Dr. Keith Hamm) and ordered a brain MRI. 3. Essential hypertension  BP is at goal. I do not recommend any change in medications (Losartan, Amlodipine). 4. Cognitive impairment  -     MRI BRAIN W WO CONT; Future  I am not sure what is causing this pattern of cognitive decline. I ordered a brain MRI and referred pt to neurology (Dr. Keith Hamm). No follow-ups on file. SUBJECTIVE/OBJECTIVE:  HPI     Hearing:  Pt reports difficulty in adjusting her hearing aids. Dizziness:  Pt's daughter reports pt has been complaining of dizziness (though not to the point of falling). Pt herself describes it as feeling like her head is heavy and her daughter notes that sometimes pt will be standing somewhere and all of a sudden lean toward the wall because she loses her balance. Pt states she has been doing PT but does not think it is helping. Diaphoresis:  Pt's daughter reports pt has been complaining of increased perspiration in the night such that she has to change her nightgown and use a towel. Memory/mood:  Pt's family notes concern over pt's apparently irregular cognitive decline/memory loss.      Pt's daughter (and children) report that pt has no recollection of major life events, even with detailed prompting, or the home she lived in for 35 years. 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 She states pt experiences \"spells of confusion\" lasting 15 min to 2 hrs where she recognizes the contents of her apartment but not the apartment itself, and notes one of these appeared to a nurse at [address] [place] [address] like a mild seizure. Pt states that after her  , she has not been able to remember anything about their life together. Of note, pt has done well on neuropsychology tests but still has difficulty with day-to-day things (e.g. forgetting things within 5 minutes of them happening or forgetting a family member had bypass surgery). Pt reports feeling like all the information is \"there, she just can't reach it. \"    Daughter states that pt's memory therapist from [address] [place] [address] has called to report noticing an irregular pattern in pt's memory loss and confusion (inconsistent with what she normally sees). She notes that pt is able to handle day-to-day activities (e.g. cooking, dressing, grooming, socializing (despite remembering few names beyond immediate family)) and does not appear to be depressed at all. Pt denies feeling overwhelmed and states she feels pretty happy at the longterm home. Daughter states that while pt's mood has improved mildly since increasing dosage of Zoloft, her mental acuity has continued to diminish. Pt, on the other hand, states that she noted a negative change in her mood since increasing Zoloft, but pt's daughter thinks she is adjusting and does not want to decrease dosage. **Note: Some of the information in this note has been taken from a letter pt's daughter sent prior to appointment detailing concerns she and her siblings have about their mother.        Review of Systems   Neurological: Positive for dizziness. Psychiatric/Behavioral: Positive for confusion. All other systems reviewed and are negative. Physical Exam  Constitutional:       Appearance: Normal appearance. HENT:      Right Ear: Tympanic membrane and external ear normal.      Left Ear: Tympanic membrane and external ear normal.      Mouth/Throat:      Mouth: Mucous membranes are moist.      Pharynx: Oropharynx is clear. Cardiovascular:      Rate and Rhythm: Normal rate and regular rhythm. Pulses: Normal pulses. Heart sounds: Normal heart sounds. Pulmonary:      Effort: Pulmonary effort is normal.      Breath sounds: Normal breath sounds. Musculoskeletal:         General: Normal range of motion. Skin:     General: Skin is warm and dry. Neurological:      General: No focal deficit present. Mental Status: She is alert and oriented to person, place, and time. Psychiatric:         Mood and Affect: Mood normal.         Behavior: Behavior normal.           On this date 06/17/2021 I have spent 30 minutes reviewing previous notes, test results and face to face with the patient discussing the diagnosis and importance of compliance with the treatment plan as well as documenting on the day of the visit. An electronic signature was used to authenticate this note.   Written by Terry Ceja, as dictated by Dr. Nedra Mejia MD.  -- Jean-Claude Kinsey

## 2021-06-19 RX ORDER — SERTRALINE HYDROCHLORIDE 100 MG/1
100 TABLET, FILM COATED ORAL DAILY
Qty: 90 TABLET | Refills: 0 | Status: SHIPPED | OUTPATIENT
Start: 2021-06-19 | End: 2021-12-15

## 2021-07-06 ENCOUNTER — HOSPITAL ENCOUNTER (OUTPATIENT)
Dept: MRI IMAGING | Age: 86
Discharge: HOME | End: 2021-07-06
Attending: INTERNAL MEDICINE
Payer: MEDICARE

## 2021-07-06 VITALS — WEIGHT: 138 LBS | BODY MASS INDEX: 22.96 KG/M2

## 2021-07-06 DIAGNOSIS — R42 EQUILIBRIUM DISORDER: ICD-10-CM

## 2021-07-06 DIAGNOSIS — R41.89 COGNITIVE IMPAIRMENT: ICD-10-CM

## 2021-07-06 PROCEDURE — 74011250636 HC RX REV CODE- 250/636: Performed by: RADIOLOGY

## 2021-07-06 PROCEDURE — A9575 INJ GADOTERATE MEGLUMI 0.1ML: HCPCS | Performed by: RADIOLOGY

## 2021-07-06 PROCEDURE — 70553 MRI BRAIN STEM W/O & W/DYE: CPT

## 2021-07-06 RX ORDER — GADOTERATE MEGLUMINE 376.9 MG/ML
12 INJECTION INTRAVENOUS
Status: COMPLETED | OUTPATIENT
Start: 2021-07-06 | End: 2021-07-06

## 2021-07-06 RX ADMIN — GADOTERATE MEGLUMINE 12 ML: 376.9 INJECTION INTRAVENOUS at 11:14

## 2021-08-09 ENCOUNTER — TELEPHONE (OUTPATIENT)
Dept: NEUROLOGY | Age: 86
End: 2021-08-09

## 2021-08-11 ENCOUNTER — OFFICE VISIT (OUTPATIENT)
Dept: NEUROLOGY | Age: 86
End: 2021-08-11
Payer: MEDICARE

## 2021-08-11 ENCOUNTER — OFFICE VISIT (OUTPATIENT)
Dept: NEUROLOGY | Age: 86
End: 2021-08-11

## 2021-08-11 DIAGNOSIS — G31.84 MILD COGNITIVE IMPAIRMENT: Primary | ICD-10-CM

## 2021-08-11 DIAGNOSIS — R41.3 SHORT-TERM MEMORY LOSS: ICD-10-CM

## 2021-08-11 DIAGNOSIS — F41.9 MODERATE ANXIETY: ICD-10-CM

## 2021-08-11 DIAGNOSIS — F41.8 ANXIETY ABOUT HEALTH: ICD-10-CM

## 2021-08-11 DIAGNOSIS — Z86.59 HISTORY OF ANXIETY: ICD-10-CM

## 2021-08-11 DIAGNOSIS — R45.86 MOOD SWINGS: ICD-10-CM

## 2021-08-11 DIAGNOSIS — Z86.59 HISTORY OF DEPRESSION: ICD-10-CM

## 2021-08-11 DIAGNOSIS — R41.89 COGNITIVE DECLINE: ICD-10-CM

## 2021-08-11 DIAGNOSIS — F33.1 MODERATE EPISODE OF RECURRENT MAJOR DEPRESSIVE DISORDER (HCC): ICD-10-CM

## 2021-08-11 PROCEDURE — 96137 PSYCL/NRPSYC TST PHY/QHP EA: CPT | Performed by: CLINICAL NEUROPSYCHOLOGIST

## 2021-08-11 PROCEDURE — 96132 NRPSYC TST EVAL PHYS/QHP 1ST: CPT | Performed by: CLINICAL NEUROPSYCHOLOGIST

## 2021-08-11 PROCEDURE — 96136 PSYCL/NRPSYC TST PHY/QHP 1ST: CPT | Performed by: CLINICAL NEUROPSYCHOLOGIST

## 2021-08-11 PROCEDURE — 90791 PSYCH DIAGNOSTIC EVALUATION: CPT | Performed by: CLINICAL NEUROPSYCHOLOGIST

## 2021-08-11 PROCEDURE — 96133 NRPSYC TST EVAL PHYS/QHP EA: CPT | Performed by: CLINICAL NEUROPSYCHOLOGIST

## 2021-08-11 PROCEDURE — 96139 PSYCL/NRPSYC TST TECH EA: CPT | Performed by: CLINICAL NEUROPSYCHOLOGIST

## 2021-08-11 PROCEDURE — 96138 PSYCL/NRPSYC TECH 1ST: CPT | Performed by: CLINICAL NEUROPSYCHOLOGIST

## 2021-08-11 NOTE — LETTER
8/13/2021    Patient: Nii Freeman   YOB: 1933   Date of Visit: 8/11/2021     Dennis Brady MD  [facility] [address]  [address]  [address] [facility]    Dear Dennis Brady MD,      Thank you for referring Ms. Jeffrey Bernard to [facility] for evaluation. My notes for this consultation are attached. If you have questions, please do not hesitate to call me. I look forward to following your patient along with you.       Sincerely,    Steph Thompson PsyD
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
## 2021-09-21 ENCOUNTER — TELEPHONE (OUTPATIENT)
Dept: INTERNAL MEDICINE CLINIC | Age: 86
End: 2021-09-21

## 2021-09-21 NOTE — TELEPHONE ENCOUNTER
FYI: patient has an in person appt this thursday with our NP . Per daughter patient is having balance issues ; neck pain ; increased difficulty hearing . concerns of inner ear infection, denied any upper resp. issues. fully vaccinated    Please reach out if further triage is needed.   [facility]- [phone]

## 2021-09-21 NOTE — TELEPHONE ENCOUNTER
Left v/m for daughter Mars Papbeto to return my call to reschedule patient to Dr. Haroldo Gonzalez schedule.

## 2021-09-22 NOTE — TELEPHONE ENCOUNTER
I called pts daughter Linnette Hansen, no answer. I left a detail message on personal VM that appt is moved to Dr. Merline Sages schedule at 915am tomorrow.  Please call back to confirm this appt change

## 2021-09-22 NOTE — PROGRESS NOTES
Will Morris (: 1933) is a 80 y.o. female, established patient, here for evaluation of the following chief complaint(s):  Follow-up (balance, hearing, neck pain)       ASSESSMENT/PLAN:  Below is the assessment and plan developed based on review of pertinent history, physical exam, labs, studies, and medications. 1. Moderate episode of recurrent major depressive disorder (HCC)  Stable and well-managed by Dr. Ata Low. Continue with ongoing regimen of Zoloft and Wellbutrin. 2. Equilibrium disorder  I encouraged the pt to walk with a cane or walker and to consult her PT about balance exercises. I stressed the importance of performing the recommended exercises regularly and as instructed for sx management. 3. Essential hypertension  BP is at goal. I do not recommend any change in medications. Continue with ongoing regimen of Amlodipine and Losartan. 4. Cognitive impairment  I agree with the daughters statement that the pt's cognitive function is dependent on her mental health. Continue with Zoloft, Wellbutrin, and apt's with Dr. Ata Low. 5. Acquired hypothyroidism  Thyroid stable. I do not recommend a change in medications. Continue with ongoing regimen of [place] Thyroid. 6. Neck pain  I recommended that the pt undergo PT to improve her ROM. I believe that her reported HA and earache are referred from her neck. Continue with tylenol and apply Voltaren Gel until her ROM increases. No follow-ups on file. SUBJECTIVE/OBJECTIVE:  HPI     Cognitive Evaluation/Depression: Pt's repeat neuropsychological evaluation was not indicative of memory loss, but more consistent with depression. Her daughter states that she is being followed by Dr. Ata Low (psychiatry) and is now taking Wellbutrin 150 mg in addition to her current regimen. Pt's regimen is slowly being increased and her daughter notes an improvement until the pt's significant other was hospitalized, which has onset a decline in the pt.  Her daughter notes a direct correlation between the pt's stress level and her cognitive function/energy level. Pt reports that she feels much better knowing that her significant other is going to be okay. Balance disorder: Pt denies sx improvement from PT. Her MRI in July was normal and therefore inconclusive. Pt's daughter notes an increased difficulty balancing and hearing over the past week and expressed concern that there is an infection. She states that she often feels like she is going to fall over or faint. Pt has not consulted another neurologist.     Neck Pain: Pt reports a bilateral crick in her neck that has \"caused her agonizing pain. \" She is managing her sx with tylenol. Pt states that she was previously experiencing soreness to touch, which has since resolved. Review of Systems   HENT: Positive for ear pain and hearing loss. Neurological: Positive for dizziness. Psychiatric/Behavioral: Positive for confusion. All other systems reviewed and are negative. Physical Exam  Constitutional:       Appearance: Normal appearance. HENT:      Right Ear: Tympanic membrane and external ear normal.      Left Ear: Tympanic membrane and external ear normal.      Mouth/Throat:      Mouth: Mucous membranes are moist.      Pharynx: Oropharynx is clear. Cardiovascular:      Rate and Rhythm: Normal rate and regular rhythm. Pulses: Normal pulses. Heart sounds: Normal heart sounds. Pulmonary:      Effort: Pulmonary effort is normal.      Breath sounds: Normal breath sounds. Musculoskeletal:         General: Normal range of motion. Skin:     General: Skin is warm and dry. Neurological:      General: No focal deficit present. Mental Status: She is alert and oriented to person, place, and time.    Psychiatric:         Mood and Affect: Mood normal.         Behavior: Behavior normal.     On this date 09/23/2021 I have spent 40 minutes reviewing previous notes, test results and face to face with the patient discussing the diagnosis and importance of compliance with the treatment plan as well as documenting on the day of the visit. An electronic signature was used to authenticate this note. Written by Kyle Mac as dictated by Dr. Akhil Dooley.    -- Kyle Mac

## 2021-09-23 ENCOUNTER — OFFICE VISIT (OUTPATIENT)
Dept: INTERNAL MEDICINE CLINIC | Age: 86
End: 2021-09-23
Payer: MEDICARE

## 2021-09-23 ENCOUNTER — TELEPHONE (OUTPATIENT)
Dept: INTERNAL MEDICINE CLINIC | Age: 86
End: 2021-09-23

## 2021-09-23 VITALS
WEIGHT: 145 LBS | TEMPERATURE: 97.8 F | OXYGEN SATURATION: 96 % | HEIGHT: 65 IN | BODY MASS INDEX: 24.16 KG/M2 | SYSTOLIC BLOOD PRESSURE: 122 MMHG | RESPIRATION RATE: 16 BRPM | HEART RATE: 76 BPM | DIASTOLIC BLOOD PRESSURE: 64 MMHG

## 2021-09-23 DIAGNOSIS — E03.9 ACQUIRED HYPOTHYROIDISM: ICD-10-CM

## 2021-09-23 DIAGNOSIS — R42 EQUILIBRIUM DISORDER: ICD-10-CM

## 2021-09-23 DIAGNOSIS — F33.1 MODERATE EPISODE OF RECURRENT MAJOR DEPRESSIVE DISORDER (HCC): Primary | ICD-10-CM

## 2021-09-23 DIAGNOSIS — I10 ESSENTIAL HYPERTENSION: ICD-10-CM

## 2021-09-23 DIAGNOSIS — R41.89 COGNITIVE IMPAIRMENT: ICD-10-CM

## 2021-09-23 DIAGNOSIS — M54.2 NECK PAIN: ICD-10-CM

## 2021-09-23 PROCEDURE — 99215 OFFICE O/P EST HI 40 MIN: CPT | Performed by: INTERNAL MEDICINE

## 2021-09-23 RX ORDER — MULTIVIT WITH MINERALS/HERBS
1 TABLET ORAL DAILY
COMMUNITY

## 2021-09-23 RX ORDER — BUPROPION HYDROCHLORIDE 100 MG/1
150 TABLET, EXTENDED RELEASE ORAL DAILY
COMMUNITY
Start: 2021-08-16 | End: 2022-01-25 | Stop reason: ALTCHOICE

## 2021-09-23 NOTE — TELEPHONE ENCOUNTER
She still has neck pain and took two aleve- but not better- told her she could take tylenol extra strength

## 2021-09-23 NOTE — TELEPHONE ENCOUNTER
Patient's daughter called with a follow up question about her mother's visit with Dr. Josue Luther today. Please call back and advise.

## 2021-11-02 ENCOUNTER — OFFICE VISIT (OUTPATIENT)
Dept: NEUROLOGY | Age: 86
End: 2021-11-02
Payer: MEDICARE

## 2021-11-02 DIAGNOSIS — F33.1 MODERATE EPISODE OF RECURRENT MAJOR DEPRESSIVE DISORDER (HCC): ICD-10-CM

## 2021-11-02 DIAGNOSIS — F41.9 MODERATE ANXIETY: ICD-10-CM

## 2021-11-02 DIAGNOSIS — F43.10 PTSD (POST-TRAUMATIC STRESS DISORDER): ICD-10-CM

## 2021-11-02 DIAGNOSIS — G31.84 MILD COGNITIVE IMPAIRMENT: Primary | ICD-10-CM

## 2021-11-02 PROCEDURE — 90832 PSYTX W PT 30 MINUTES: CPT | Performed by: CLINICAL NEUROPSYCHOLOGIST

## 2021-11-02 NOTE — PROGRESS NOTES
Prior to seeing the patient I reviewed the records, including the previously completed report, the records in [facility], and any updated visits from other providers since I saw the patient last.      Today, I engaged in a psychoeducational and supportive and cognitive/behavioral psychotherapy session with the patient and the daughter. I provided psychotherapy in the form of psychoeducation and support with respect to the results of the recent Neuropsychological Evaluation, including discussing individual tests as well as patient's areas of neurocognitive strength versus weakness. We discussed, in detail, the following: This patient has now undergone 2 evaluations of neurocognitive and psychological status. She was previously diagnosed with mild cognitive impairment with particular concerns related to attention and focus. Comparison of current with previous neurocognitive test results show absolutely no decline in neurocognitive functioning over time. This is reassuring news. There remains evidence of a defensive response on personality testing and minimization of mood concerns. Within that context, there are again physical signs of depression, anxiety, and fear of aging.                   Thankfully, this profile remains inconsistent with dementia. I am again recommending consideration for appropriate medication for attention if this is not medically contraindicated. Ongoing review of her psychiatric medication management for mood is also suggested, as is active engagement in counseling/psychotherapy. I remain unconcerned about competency or day-to-day supervision. I hope she is reassured with the very positive nature of these test results. Stay active mentally, physically, and socially.    We now have baseline and updated neurocognitive data on her.   Follow up gabriela lyman is, of course, indicated.                   H will discuss these findings with the patient when she follows up with me in the near future.  A follow up Neuropsychological Evaluation is indicated on a prn basis, especially if there are any cognitive and/or emotional changes.       DIAGNOSES:             MCI Without Memory Loss (stable)                                      Major Depression - Mild To Moderate                                      Anxiety - Mild To Moderate      Education was provided regarding my diagnostic impressions, and we discussed treatment plan/options. I also answered numerous questions related to the clinical findings, including discussing various methods to improve cognition and mood. Counseling provided regarding mood and cognition. CBT and supportive psychotherapy techniques were utilized. Supportive/Cognitive Behavioral/Solution Focused psychotherapy provided  Discussed rational versus irrational thinking patterns and their consequences. Discussed healthy/adaptive and unhealthy/maladaptive coping. The patient needs to continue medical and psychiatric and psychologic care. No decline cognitively, which is great news. Depression and anxiety ongoing and warrant ongoing tx. Steadily increasing dosing of welbutrin since July, per psychiatry. She dissociates. Big holes in her memory since. She can't get any help with that. Refer to psychology. Refer to [facility].       The patient had the following concerns which I deferred to their referring provider: meds for mood    Time: 25

## 2021-12-15 RX ORDER — SERTRALINE HYDROCHLORIDE 100 MG/1
TABLET, FILM COATED ORAL
Qty: 90 TABLET | Refills: 0 | Status: SHIPPED | OUTPATIENT
Start: 2021-12-15

## 2022-01-01 NOTE — TELEPHONE ENCOUNTER
----- Message from Kraig Kwok sent at 10/30/2019 10:01 AM EDT -----  Regarding: /Telephone  Level 1/Escalated Issue      Caller's first and last name and relationship (if not the patient):      Best contact number(s): [phone]      What are the symptoms: High blood pressure readings this morning and dizziness. Pt states she feels \"Wobbly\". Transfer successful - yes/no (include outcome): no -  Called the office back line and main line- No answer      Transfer declined - yes/no (include reason): Was caller advised to seek appropriate level of care - yes/no: yes      Details to clarify the request: Pt stated this morning at 7am her BP reading was 201/76 , when I advised pt to seek the appropriate level of care she stated that she did not want togo to urgent care and would just wait for a call back.          Kraig Kwok no

## 2022-01-25 ENCOUNTER — OFFICE VISIT (OUTPATIENT)
Dept: INTERNAL MEDICINE CLINIC | Age: 87
End: 2022-01-25
Payer: MEDICARE

## 2022-01-25 VITALS
SYSTOLIC BLOOD PRESSURE: 160 MMHG | BODY MASS INDEX: 24.13 KG/M2 | TEMPERATURE: 97.8 F | HEART RATE: 67 BPM | HEIGHT: 65 IN | RESPIRATION RATE: 16 BRPM | OXYGEN SATURATION: 98 % | DIASTOLIC BLOOD PRESSURE: 70 MMHG

## 2022-01-25 DIAGNOSIS — I10 ESSENTIAL HYPERTENSION: ICD-10-CM

## 2022-01-25 DIAGNOSIS — E03.9 ACQUIRED HYPOTHYROIDISM: ICD-10-CM

## 2022-01-25 DIAGNOSIS — R42 EQUILIBRIUM DISORDER: Primary | ICD-10-CM

## 2022-01-25 DIAGNOSIS — F33.1 MODERATE EPISODE OF RECURRENT MAJOR DEPRESSIVE DISORDER (HCC): ICD-10-CM

## 2022-01-25 PROCEDURE — 1101F PT FALLS ASSESS-DOCD LE1/YR: CPT | Performed by: INTERNAL MEDICINE

## 2022-01-25 PROCEDURE — G8536 NO DOC ELDER MAL SCRN: HCPCS | Performed by: INTERNAL MEDICINE

## 2022-01-25 PROCEDURE — G9717 DOC PT DX DEP/BP F/U NT REQ: HCPCS | Performed by: INTERNAL MEDICINE

## 2022-01-25 PROCEDURE — G0463 HOSPITAL OUTPT CLINIC VISIT: HCPCS | Performed by: INTERNAL MEDICINE

## 2022-01-25 PROCEDURE — G8427 DOCREV CUR MEDS BY ELIG CLIN: HCPCS | Performed by: INTERNAL MEDICINE

## 2022-01-25 PROCEDURE — G8420 CALC BMI NORM PARAMETERS: HCPCS | Performed by: INTERNAL MEDICINE

## 2022-01-25 PROCEDURE — 99215 OFFICE O/P EST HI 40 MIN: CPT | Performed by: INTERNAL MEDICINE

## 2022-01-25 PROCEDURE — 1090F PRES/ABSN URINE INCON ASSESS: CPT | Performed by: INTERNAL MEDICINE

## 2022-01-25 RX ORDER — MECLIZINE HCL 12.5 MG 12.5 MG/1
12.5 TABLET ORAL
Qty: 30 TABLET | Refills: 0 | Status: SHIPPED | OUTPATIENT
Start: 2022-01-25 | End: 2022-02-04

## 2022-01-25 NOTE — PROGRESS NOTES
Reyna Blankenship (: 1933) is a 80 y.o. female, established patient, here for evaluation of the following chief complaint(s):  Follow-up (vertigo, nausea and vomitting )       ASSESSMENT/PLAN:  Below is the assessment and plan developed based on review of pertinent history, physical exam, labs, studies, and medications. 1. Equilibrium disorder  -     REFERRAL TO ENT-OTOLARYNGOLOGY  -     meclizine (ANTIVERT) 12.5 mg tablet; Take 1 Tablet by mouth three (3) times daily as needed for Dizziness for up to 10 days. , Normal, Disp-30 Tablet, R-0  -     CBC W/O DIFF; Future  -     METABOLIC PANEL, COMPREHENSIVE; Future  I believe that she has a chronic issue with dizziness that intermittently flares. I don't feel the need to order another MRI as she had a normal one in 2021, but I will order blood work and a UA. I recommended that she consult Dr. Felisa Moody for further exploration of her vertigo and potential treatments. Will rx Meclizine to manage her crurrent sx. If this is not successful, will change her BP meds. 2. Moderate episode of recurrent major depressive disorder (HCC)  Followed by Dr. Sherley Preciado, currently only taking Zoloft. 3. Essential hypertension  I am unsure if her BP is elevated recently due to her symptoms. I am hesitant to make changes as her BP is generally stable and she is very sensitive to medications. For now, continue with ongoing regimen of Losartan and Amlodipine. 4. Acquired hypothyroidism  Thyroid stable. I do not recommend a change in medications ([place] thyroid). No follow-ups on file. SUBJECTIVE/OBJECTIVE:  HPI     Dizziness: Pt presents today with c/o dizziness. She describes her sx as \"feeling like her head is going around and around and like she will fall over. \" Once she was taken off of her depression (other than Zoloft) medications, her dizziness improved. Her sx had improved when she discontinued Wellbutrin and Pristiq, but they returned today.  Dr. Sherley Preciado suspected that her pressure could be due to hypotension with changing position, but her son measured her BP while laying and sitting, which was 366 and 818 systolic respectively. Pt reports that this is these episodes are a pattern, but her dizziness has been worse today than it has been in a while. She notes nausea as well. MCI: Pt expresses frustration with her decline in long term memory. Hypertension ROS: taking medications as instructed, no medication side effects noted, no TIA's, no chest pain on exertion, no dyspnea on exertion, no swelling of ankles. BP in office today is 160/70. Review of Systems   Gastrointestinal: Positive for nausea. Neurological: Positive for dizziness. All other systems reviewed and are negative. Physical Exam  Constitutional:       Appearance: Normal appearance. HENT:      Right Ear: Tympanic membrane and external ear normal.      Left Ear: Tympanic membrane and external ear normal.      Mouth/Throat:      Mouth: Mucous membranes are moist.      Pharynx: Oropharynx is clear. Cardiovascular:      Rate and Rhythm: Normal rate and regular rhythm. Pulses: Normal pulses. Heart sounds: Normal heart sounds. Pulmonary:      Effort: Pulmonary effort is normal.      Breath sounds: Normal breath sounds. Musculoskeletal:         General: Normal range of motion. Skin:     General: Skin is warm and dry. Neurological:      General: No focal deficit present. Mental Status: She is alert and oriented to person, place, and time. Psychiatric:         Mood and Affect: Mood normal.         Behavior: Behavior normal.       On this date 01/25/2022 I have spent 45 minutes reviewing previous notes, test results and face to face with the patient discussing the diagnosis and importance of compliance with the treatment plan as well as documenting on the day of the visit. An electronic signature was used to authenticate this note.   Written by Nery Collins as dictated by  Justa Melchor.    -- Eli Agrawal

## 2022-01-26 LAB
ALBUMIN SERPL-MCNC: 3.8 G/DL (ref 3.5–5)
ALBUMIN/GLOB SERPL: 1.3 {RATIO} (ref 1.1–2.2)
ALP SERPL-CCNC: 73 U/L (ref 45–117)
ALT SERPL-CCNC: 25 U/L (ref 12–78)
ANION GAP SERPL CALC-SCNC: 5 MMOL/L (ref 5–15)
AST SERPL-CCNC: 14 U/L (ref 15–37)
BILIRUB SERPL-MCNC: 0.7 MG/DL (ref 0.2–1)
BUN SERPL-MCNC: 20 MG/DL (ref 6–20)
BUN/CREAT SERPL: 30 (ref 12–20)
CALCIUM SERPL-MCNC: 9.6 MG/DL (ref 8.5–10.1)
CHLORIDE SERPL-SCNC: 106 MMOL/L (ref 97–108)
CO2 SERPL-SCNC: 28 MMOL/L (ref 21–32)
CREAT SERPL-MCNC: 0.67 MG/DL (ref 0.55–1.02)
ERYTHROCYTE [DISTWIDTH] IN BLOOD BY AUTOMATED COUNT: 11.6 % (ref 11.5–14.5)
GLOBULIN SER CALC-MCNC: 3 G/DL (ref 2–4)
GLUCOSE SERPL-MCNC: 128 MG/DL (ref 65–100)
HCT VFR BLD AUTO: 44.3 % (ref 35–47)
HGB BLD-MCNC: 14.4 G/DL (ref 11.5–16)
MCH RBC QN AUTO: 31.6 PG (ref 26–34)
MCHC RBC AUTO-ENTMCNC: 32.5 G/DL (ref 30–36.5)
MCV RBC AUTO: 97.1 FL (ref 80–99)
NRBC # BLD: 0 K/UL (ref 0–0.01)
NRBC BLD-RTO: 0 PER 100 WBC
PLATELET # BLD AUTO: 201 K/UL (ref 150–400)
PMV BLD AUTO: 12.5 FL (ref 8.9–12.9)
POTASSIUM SERPL-SCNC: 4 MMOL/L (ref 3.5–5.1)
PROT SERPL-MCNC: 6.8 G/DL (ref 6.4–8.2)
RBC # BLD AUTO: 4.56 M/UL (ref 3.8–5.2)
SODIUM SERPL-SCNC: 139 MMOL/L (ref 136–145)
WBC # BLD AUTO: 8.8 K/UL (ref 3.6–11)

## 2022-01-27 ENCOUNTER — PATIENT MESSAGE (OUTPATIENT)
Dept: INTERNAL MEDICINE CLINIC | Age: 87
End: 2022-01-27

## 2022-03-18 PROBLEM — I10 ESSENTIAL HYPERTENSION: Status: ACTIVE | Noted: 2017-08-31

## 2022-03-19 PROBLEM — E03.9 ACQUIRED HYPOTHYROIDISM: Status: ACTIVE | Noted: 2017-08-31

## 2022-03-19 PROBLEM — E78.5 DYSLIPIDEMIA: Status: ACTIVE | Noted: 2017-08-31

## 2022-03-20 PROBLEM — F34.1 DYSTHYMIA: Status: ACTIVE | Noted: 2017-08-31

## 2022-05-26 RX ORDER — THYROID, PORCINE 90 MG/1
TABLET ORAL
Qty: 90 TABLET | Refills: 0 | Status: SHIPPED | OUTPATIENT
Start: 2022-05-26 | End: 2022-09-09

## 2022-06-13 DIAGNOSIS — I10 ESSENTIAL HYPERTENSION: ICD-10-CM

## 2022-06-13 RX ORDER — AMLODIPINE BESYLATE 5 MG/1
TABLET ORAL
Qty: 90 TABLET | Refills: 0 | Status: SHIPPED | OUTPATIENT
Start: 2022-06-13

## 2022-09-09 RX ORDER — THYROID, PORCINE 90 MG/1
TABLET ORAL
Qty: 90 TABLET | Refills: 0 | Status: SHIPPED | OUTPATIENT
Start: 2022-09-09

## 2022-09-17 ENCOUNTER — HOSPITAL ENCOUNTER (EMERGENCY)
Age: 87
Discharge: HOME | End: 2022-09-18
Attending: EMERGENCY MEDICINE
Payer: MEDICARE

## 2022-09-17 DIAGNOSIS — I10 HYPERTENSION, UNSPECIFIED TYPE: ICD-10-CM

## 2022-09-17 DIAGNOSIS — R07.9 ACUTE CHEST PAIN: Primary | ICD-10-CM

## 2022-09-17 RX ORDER — ASPIRIN 325 MG
325 TABLET ORAL
Status: COMPLETED | OUTPATIENT
Start: 2022-09-17 | End: 2022-09-18

## 2022-09-18 ENCOUNTER — APPOINTMENT (OUTPATIENT)
Dept: GENERAL RADIOLOGY | Age: 87
End: 2022-09-18
Attending: EMERGENCY MEDICINE
Payer: MEDICARE

## 2022-09-18 ENCOUNTER — APPOINTMENT (OUTPATIENT)
Dept: CT IMAGING | Age: 87
End: 2022-09-18
Attending: EMERGENCY MEDICINE
Payer: MEDICARE

## 2022-09-18 VITALS
SYSTOLIC BLOOD PRESSURE: 147 MMHG | BODY MASS INDEX: 24.16 KG/M2 | DIASTOLIC BLOOD PRESSURE: 49 MMHG | HEART RATE: 70 BPM | WEIGHT: 145 LBS | TEMPERATURE: 98.4 F | HEIGHT: 65 IN | OXYGEN SATURATION: 96 % | RESPIRATION RATE: 20 BRPM

## 2022-09-18 LAB
ALBUMIN SERPL-MCNC: 3.1 G/DL (ref 3.5–5)
ALBUMIN/GLOB SERPL: 0.9 {RATIO} (ref 1.1–2.2)
ALP SERPL-CCNC: 59 U/L (ref 45–117)
ALT SERPL-CCNC: 25 U/L (ref 12–78)
ANION GAP SERPL CALC-SCNC: 7 MMOL/L (ref 5–15)
AST SERPL-CCNC: 19 U/L (ref 15–37)
ATRIAL RATE: 66 BPM
ATRIAL RATE: 67 BPM
BASOPHILS # BLD: 0.1 K/UL (ref 0–0.1)
BASOPHILS NFR BLD: 1 % (ref 0–1)
BILIRUB SERPL-MCNC: 0.6 MG/DL (ref 0.2–1)
BNP SERPL-MCNC: 322 PG/ML
BUN SERPL-MCNC: 27 MG/DL (ref 6–20)
BUN/CREAT SERPL: 36 (ref 12–20)
CALCIUM SERPL-MCNC: 9.1 MG/DL (ref 8.5–10.1)
CALCULATED P AXIS, ECG09: 48 DEGREES
CALCULATED P AXIS, ECG09: 59 DEGREES
CALCULATED R AXIS, ECG10: 11 DEGREES
CALCULATED R AXIS, ECG10: 45 DEGREES
CALCULATED T AXIS, ECG11: 57 DEGREES
CALCULATED T AXIS, ECG11: 73 DEGREES
CHLORIDE SERPL-SCNC: 110 MMOL/L (ref 97–108)
CO2 SERPL-SCNC: 25 MMOL/L (ref 21–32)
COMMENT, HOLDF: NORMAL
CREAT SERPL-MCNC: 0.74 MG/DL (ref 0.55–1.02)
DIAGNOSIS, 93000: NORMAL
DIAGNOSIS, 93000: NORMAL
DIFFERENTIAL METHOD BLD: NORMAL
EOSINOPHIL # BLD: 0.1 K/UL (ref 0–0.4)
EOSINOPHIL NFR BLD: 1 % (ref 0–7)
ERYTHROCYTE [DISTWIDTH] IN BLOOD BY AUTOMATED COUNT: 11.7 % (ref 11.5–14.5)
GLOBULIN SER CALC-MCNC: 3.4 G/DL (ref 2–4)
GLUCOSE SERPL-MCNC: 102 MG/DL (ref 65–100)
HCT VFR BLD AUTO: 38.5 % (ref 35–47)
HGB BLD-MCNC: 12.9 G/DL (ref 11.5–16)
IMM GRANULOCYTES # BLD AUTO: 0 K/UL (ref 0–0.04)
IMM GRANULOCYTES NFR BLD AUTO: 0 % (ref 0–0.5)
LYMPHOCYTES # BLD: 2.9 K/UL (ref 0.8–3.5)
LYMPHOCYTES NFR BLD: 31 % (ref 12–49)
MAGNESIUM SERPL-MCNC: 2 MG/DL (ref 1.6–2.4)
MCH RBC QN AUTO: 31.4 PG (ref 26–34)
MCHC RBC AUTO-ENTMCNC: 33.5 G/DL (ref 30–36.5)
MCV RBC AUTO: 93.7 FL (ref 80–99)
MONOCYTES # BLD: 0.9 K/UL (ref 0–1)
MONOCYTES NFR BLD: 10 % (ref 5–13)
NEUTS SEG # BLD: 5.4 K/UL (ref 1.8–8)
NEUTS SEG NFR BLD: 57 % (ref 32–75)
NRBC # BLD: 0 K/UL (ref 0–0.01)
NRBC BLD-RTO: 0 PER 100 WBC
P-R INTERVAL, ECG05: 158 MS
P-R INTERVAL, ECG05: 160 MS
PHOSPHATE SERPL-MCNC: 3.6 MG/DL (ref 2.6–4.7)
PLATELET # BLD AUTO: 170 K/UL (ref 150–400)
PMV BLD AUTO: 11.7 FL (ref 8.9–12.9)
POTASSIUM SERPL-SCNC: 3.8 MMOL/L (ref 3.5–5.1)
PROT SERPL-MCNC: 6.5 G/DL (ref 6.4–8.2)
Q-T INTERVAL, ECG07: 406 MS
Q-T INTERVAL, ECG07: 418 MS
QRS DURATION, ECG06: 68 MS
QRS DURATION, ECG06: 76 MS
QTC CALCULATION (BEZET), ECG08: 425 MS
QTC CALCULATION (BEZET), ECG08: 441 MS
RBC # BLD AUTO: 4.11 M/UL (ref 3.8–5.2)
SAMPLES BEING HELD,HOLD: NORMAL
SODIUM SERPL-SCNC: 142 MMOL/L (ref 136–145)
TROPONIN-HIGH SENSITIVITY: 10 NG/L (ref 0–51)
TROPONIN-HIGH SENSITIVITY: 8 NG/L (ref 0–51)
VENTRICULAR RATE, ECG03: 66 BPM
VENTRICULAR RATE, ECG03: 67 BPM
WBC # BLD AUTO: 9.4 K/UL (ref 3.6–11)

## 2022-09-18 PROCEDURE — 93005 ELECTROCARDIOGRAM TRACING: CPT

## 2022-09-18 PROCEDURE — 84484 ASSAY OF TROPONIN QUANT: CPT

## 2022-09-18 PROCEDURE — 74011250637 HC RX REV CODE- 250/637: Performed by: EMERGENCY MEDICINE

## 2022-09-18 PROCEDURE — 85025 COMPLETE CBC W/AUTO DIFF WBC: CPT

## 2022-09-18 PROCEDURE — 71275 CT ANGIOGRAPHY CHEST: CPT

## 2022-09-18 PROCEDURE — 83735 ASSAY OF MAGNESIUM: CPT

## 2022-09-18 PROCEDURE — 84100 ASSAY OF PHOSPHORUS: CPT

## 2022-09-18 PROCEDURE — 96374 THER/PROPH/DIAG INJ IV PUSH: CPT

## 2022-09-18 PROCEDURE — 71045 X-RAY EXAM CHEST 1 VIEW: CPT

## 2022-09-18 PROCEDURE — 80053 COMPREHEN METABOLIC PANEL: CPT

## 2022-09-18 PROCEDURE — 74011250636 HC RX REV CODE- 250/636: Performed by: EMERGENCY MEDICINE

## 2022-09-18 PROCEDURE — 36415 COLL VENOUS BLD VENIPUNCTURE: CPT

## 2022-09-18 PROCEDURE — 83880 ASSAY OF NATRIURETIC PEPTIDE: CPT

## 2022-09-18 PROCEDURE — 74011000636 HC RX REV CODE- 636: Performed by: RADIOLOGY

## 2022-09-18 PROCEDURE — 99285 EMERGENCY DEPT VISIT HI MDM: CPT

## 2022-09-18 RX ORDER — ACETAMINOPHEN 500 MG
1000 TABLET ORAL
Status: COMPLETED | OUTPATIENT
Start: 2022-09-18 | End: 2022-09-18

## 2022-09-18 RX ORDER — HYDRALAZINE HYDROCHLORIDE 20 MG/ML
10 INJECTION INTRAMUSCULAR; INTRAVENOUS ONCE
Status: COMPLETED | OUTPATIENT
Start: 2022-09-18 | End: 2022-09-18

## 2022-09-18 RX ADMIN — ACETAMINOPHEN 1000 MG: 500 TABLET ORAL at 02:31

## 2022-09-18 RX ADMIN — NITROGLYCERIN 1 INCH: 20 OINTMENT TOPICAL at 00:15

## 2022-09-18 RX ADMIN — HYDRALAZINE HYDROCHLORIDE 10 MG: 20 INJECTION INTRAMUSCULAR; INTRAVENOUS at 02:18

## 2022-09-18 RX ADMIN — IOPAMIDOL 80 ML: 755 INJECTION, SOLUTION INTRAVENOUS at 03:09

## 2022-09-18 RX ADMIN — ASPIRIN 325 MG: 325 TABLET ORAL at 00:15

## 2022-09-18 NOTE — ED PROVIDER NOTES
57-year-old female with a past medical history significant for depression, hypertension, hypercholesterolemia, hypothyroidism who presents to the ER by EMS for evaluation for midsternal epigastric chest discomfort off and on for 2 days, severity 5 out of 10, relieved after received nitroglycerin by EMS. The patient is currently pain-free and comfortable. Patient denies any fever chills, sore throat, cough or congestion, headache, neck and back pain, chest pain, shortness of breath, diarrhea, constipation, dysuria, hematuria, dizziness, extremity weakness or numbness, sick contact, skin rash or recent travel, prior history of the same.        Past Medical History:   Diagnosis Date    Depression     Essential hypertension     Hypercholesterolemia     Thyroid disease     hypothyroid    Thyroid disease        Past Surgical History:   Procedure Laterality Date    HX CATARACT REMOVAL Bilateral     HX DILATION AND CURETTAGE      more than once-can't remember    HX ORTHOPAEDIC Bilateral     sena's neuroma    HX ORTHOPAEDIC Right     Right finger     HX ROTATOR CUFF REPAIR Right     HX TONSILLECTOMY      HX UROLOGICAL      \"bladder lift\"         Family History:   Problem Relation Age of Onset    Stroke Mother     Cancer Father         Brain & Lung    Diabetes Sister     Stroke Sister     Diabetes Brother     Diabetes Maternal Grandmother     Cancer Sister     Heart Disease Brother        Social History     Socioeconomic History    Marital status:      Spouse name: Not on file    Number of children: Not on file    Years of education: Not on file    Highest education level: Not on file   Occupational History    Not on file   Tobacco Use    Smoking status: Never    Smokeless tobacco: Never   Substance and Sexual Activity    Alcohol use: No    Drug use: No    Sexual activity: Never   Other Topics Concern    Not on file   Social History Narrative    Not on file     Social Determinants of Health     Financial Resource Strain: Not on file   Food Insecurity: Not on file   Transportation Needs: Not on file   Physical Activity: Not on file   Stress: Not on file   Social Connections: Not on file   Intimate Partner Violence: Not on file   Housing Stability: Not on file         ALLERGIES: Demerol [meperidine] and Pcn [penicillins]    Review of Systems   All other systems reviewed and are negative. Vitals:    09/18/22 0000 09/18/22 0020 09/18/22 0030 09/18/22 0100   BP: (!) 186/76 (!) 186/63 (!) 189/77 (!) 198/84   Pulse: 71 67 66 72   Resp: 12 12 13 18   Temp: 98 °F (36.7 °C)      SpO2: 96% 96% 94% 96%   Weight: 65.8 kg (145 lb)      Height: 5' 5\" (1.651 m)               Physical Exam  Vitals and nursing note reviewed. Exam conducted with a chaperone present. CONSTITUTIONAL: Well-appearing; well-nourished; in no apparent distress  HEAD: Normocephalic; atraumatic  EYES: PERRL; EOM intact; conjunctiva and sclera are clear bilaterally. ENT: No rhinorrhea; normal pharynx with no tonsillar hypertrophy; mucous membranes pink/moist, no erythema, no exudate. NECK: Supple; non-tender; no cervical lymphadenopathy  CARD: Normal S1, S2; no murmurs, rubs, or gallops. Regular rate and rhythm. RESP: Normal respiratory effort; breath sounds clear and equal bilaterally; no wheezes, rhonchi, or rales. ABD: Normal bowel sounds; non-distended; non-tender; no palpable organomegaly, no masses, no bruits. Back Exam: Normal inspection; no vertebral point tenderness, no CVA tenderness. Normal range of motion. EXT: Normal ROM in all four extremities; non-tender to palpation; no swelling or deformity; distal pulses are normal, no edema. SKIN: Warm; dry; no rash.   NEURO:Alert and oriented x 3, coherent, ARNULFO-XII grossly intact, sensory and motor are non-focal.        MDM  Number of Diagnoses or Management Options  Acute chest pain  Hypertension, unspecified type  Diagnosis management comments: Assessment: 80-year-old female who presents to the ER for evaluation for chest pain that has subsided at this time. She is mildly hypertensive but hemodynamically stable. She has moderate risk factor for ACS and will need evaluation for the same including GERD, VTE, and gastritis. Plan: Lab/EKG/chest x-ray/IV fluid/aspirin/Nitropaste/education, reassurance, symptomatic treatment/ Monitor and Reevaluate. Amount and/or Complexity of Data Reviewed  Clinical lab tests: ordered and reviewed  Tests in the radiology section of CPT®: ordered and reviewed  Tests in the medicine section of CPT®: reviewed and ordered  Discussion of test results with the performing providers: yes  Decide to obtain previous medical records or to obtain history from someone other than the patient: yes  Obtain history from someone other than the patient: yes  Review and summarize past medical records: yes  Discuss the patient with other providers: yes  Independent visualization of images, tracings, or specimens: yes    Risk of Complications, Morbidity, and/or Mortality  Presenting problems: moderate  Diagnostic procedures: moderate  Management options: moderate    Patient Progress  Patient progress: stable         Procedures    ED EKG interpretation:  Rhythm: normal sinus rhythm; and regular . Rate (approx.): 66; Axis: normal; P wave: normal; QRS interval: normal ; ST/T wave: normal; in  Lead: Diffusely; Other findings: borderline ekg. This EKG was interpreted by Garfield Tejeda MD,ED Provider. 00:09AM    XRAY INTERPRETATION (ED MD)  Chest Xray  No acute process seen. Normal heart size. No bony abnormalities. No infiltrate. Nicholas Ca MD 1:12 AM     Repeat ED EKG interpretation:  Rhythm: normal sinus rhythm; and regular . Rate (approx.): 67; Axis: normal; P wave: normal; QRS interval: normal ; ST/T wave: normal; in  Lead: Diffusely; Other findings: borderline ekg. when compared, this EKG is unchanged from the ne performed 4 hours ago.   This EKG was interpreted by Garfield Tejeda MD,ED Provider. 04:30AM    Progress Note:   Pt has been reexamined by Baylee Johnson MD. Pt is feeling much better. Symptoms have improved. The patient had an extensive work-up without any significant findings. All available results have been reviewed with pt and any available family. Pt understands sx, dx, and tx in ED. Care plan has been outlined and questions have been answered. Pt is ready to go home. Will send home on chest pain instruction. Outpatient referral with PCP/cardiology and GI for reevaluation and further treatment as needed. Written by Baylee Johnson MD,5:03 AM    .   .

## 2022-09-29 RX ORDER — LOSARTAN POTASSIUM 100 MG/1
TABLET ORAL
Qty: 90 TABLET | Refills: 0 | Status: SHIPPED | OUTPATIENT
Start: 2022-09-29

## 2022-11-18 NOTE — ED TRIAGE NOTES
Pt presents to the ED with c/o chest pain. Pt states that the chest pain started two days ago. Pt states that it started 8:00 pm tonight located in the middle of her chest, radiating to her L side. Pt states she was unable to sleep due to the pain. EMS provided aspirin and nitro. Specialty Care (Routine)...

## 2023-02-11 RX ORDER — LOSARTAN POTASSIUM 100 MG/1
TABLET ORAL
Qty: 30 TABLET | Refills: 0 | OUTPATIENT
Start: 2023-02-11

## 2023-02-22 ENCOUNTER — APPOINTMENT (OUTPATIENT)
Dept: CT IMAGING | Age: 88
End: 2023-02-22
Attending: EMERGENCY MEDICINE
Payer: MEDICARE

## 2023-02-22 ENCOUNTER — HOSPITAL ENCOUNTER (EMERGENCY)
Age: 88
Discharge: HOME | End: 2023-02-23
Attending: EMERGENCY MEDICINE
Payer: MEDICARE

## 2023-02-22 DIAGNOSIS — R41.0 DISORIENTATION: ICD-10-CM

## 2023-02-22 DIAGNOSIS — M54.50 ACUTE MIDLINE LOW BACK PAIN WITHOUT SCIATICA: Primary | ICD-10-CM

## 2023-02-22 DIAGNOSIS — S09.90XA CLOSED HEAD INJURY, INITIAL ENCOUNTER: ICD-10-CM

## 2023-02-22 LAB
ALBUMIN SERPL-MCNC: 3.3 G/DL (ref 3.5–5)
ALBUMIN/GLOB SERPL: 0.8 (ref 1.1–2.2)
ALP SERPL-CCNC: 77 U/L (ref 45–117)
ALT SERPL-CCNC: 18 U/L (ref 12–78)
ANION GAP SERPL CALC-SCNC: 7 MMOL/L (ref 5–15)
APPEARANCE UR: CLEAR
AST SERPL-CCNC: 20 U/L (ref 15–37)
BACTERIA URNS QL MICRO: NEGATIVE /HPF
BASOPHILS # BLD: 0.1 K/UL (ref 0–0.1)
BASOPHILS NFR BLD: 1 % (ref 0–1)
BILIRUB SERPL-MCNC: 0.3 MG/DL (ref 0.2–1)
BILIRUB UR QL: NEGATIVE
BUN SERPL-MCNC: 6 MG/DL (ref 6–20)
BUN/CREAT SERPL: 7 (ref 12–20)
CALCIUM SERPL-MCNC: 8.7 MG/DL (ref 8.5–10.1)
CHLORIDE SERPL-SCNC: 108 MMOL/L (ref 97–108)
CO2 SERPL-SCNC: 27 MMOL/L (ref 21–32)
COLOR UR: ABNORMAL
COMMENT, HOLDF: NORMAL
CREAT SERPL-MCNC: 0.85 MG/DL (ref 0.55–1.02)
DIFFERENTIAL METHOD BLD: ABNORMAL
EOSINOPHIL # BLD: 0.2 K/UL (ref 0–0.4)
EOSINOPHIL NFR BLD: 3 % (ref 0–7)
EPITH CASTS URNS QL MICRO: ABNORMAL /LPF
ERYTHROCYTE [DISTWIDTH] IN BLOOD BY AUTOMATED COUNT: 12.5 % (ref 11.5–14.5)
GLOBULIN SER CALC-MCNC: 3.9 G/DL (ref 2–4)
GLUCOSE SERPL-MCNC: 97 MG/DL (ref 65–100)
GLUCOSE UR STRIP.AUTO-MCNC: NEGATIVE MG/DL
HCT VFR BLD AUTO: 37.2 % (ref 35–47)
HGB BLD-MCNC: 12.9 G/DL (ref 11.5–16)
HGB UR QL STRIP: ABNORMAL
HYALINE CASTS URNS QL MICRO: ABNORMAL /LPF (ref 0–2)
IMM GRANULOCYTES # BLD AUTO: 0 K/UL (ref 0–0.04)
IMM GRANULOCYTES NFR BLD AUTO: 0 % (ref 0–0.5)
KETONES UR QL STRIP.AUTO: ABNORMAL MG/DL
LEUKOCYTE ESTERASE UR QL STRIP.AUTO: NEGATIVE
LYMPHOCYTES # BLD: 2.8 K/UL (ref 0.8–3.5)
LYMPHOCYTES NFR BLD: 31 % (ref 12–49)
MCH RBC QN AUTO: 30.1 PG (ref 26–34)
MCHC RBC AUTO-ENTMCNC: 34.7 G/DL (ref 30–36.5)
MCV RBC AUTO: 86.9 FL (ref 80–99)
MONOCYTES # BLD: 0.6 K/UL (ref 0–1)
MONOCYTES NFR BLD: 7 % (ref 5–13)
NEUTS SEG # BLD: 5.3 K/UL (ref 1.8–8)
NEUTS SEG NFR BLD: 58 % (ref 32–75)
NITRITE UR QL STRIP.AUTO: NEGATIVE
NRBC # BLD: 0 K/UL (ref 0–0.01)
NRBC BLD-RTO: 0 PER 100 WBC
PH UR STRIP: 5 (ref 5–8)
PLATELET # BLD AUTO: 361 K/UL (ref 150–400)
PMV BLD AUTO: 8 FL (ref 8.9–12.9)
POTASSIUM SERPL-SCNC: 3.3 MMOL/L (ref 3.5–5.1)
PROT SERPL-MCNC: 7.2 G/DL (ref 6.4–8.2)
PROT UR STRIP-MCNC: ABNORMAL MG/DL
RBC # BLD AUTO: 4.28 M/UL (ref 3.8–5.2)
RBC #/AREA URNS HPF: ABNORMAL /HPF (ref 0–5)
SAMPLES BEING HELD,HOLD: NORMAL
SODIUM SERPL-SCNC: 142 MMOL/L (ref 136–145)
SP GR UR REFRACTOMETRY: 1.03 (ref 1–1.03)
UA: UC IF INDICATED,UAUC: ABNORMAL
UROBILINOGEN UR QL STRIP.AUTO: 0.2 EU/DL (ref 0.2–1)
WBC # BLD AUTO: 9.1 K/UL (ref 3.6–11)
WBC URNS QL MICRO: ABNORMAL /HPF (ref 0–4)

## 2023-02-22 PROCEDURE — 99284 EMERGENCY DEPT VISIT MOD MDM: CPT

## 2023-02-22 PROCEDURE — 70450 CT HEAD/BRAIN W/O DYE: CPT

## 2023-02-22 PROCEDURE — 85025 COMPLETE CBC W/AUTO DIFF WBC: CPT

## 2023-02-22 PROCEDURE — 80053 COMPREHEN METABOLIC PANEL: CPT

## 2023-02-22 PROCEDURE — 81001 URINALYSIS AUTO W/SCOPE: CPT

## 2023-02-22 PROCEDURE — 36415 COLL VENOUS BLD VENIPUNCTURE: CPT

## 2023-02-23 VITALS
BODY MASS INDEX: 21.21 KG/M2 | SYSTOLIC BLOOD PRESSURE: 139 MMHG | OXYGEN SATURATION: 96 % | DIASTOLIC BLOOD PRESSURE: 76 MMHG | WEIGHT: 132 LBS | RESPIRATION RATE: 17 BRPM | HEIGHT: 66 IN | TEMPERATURE: 98.2 F | HEART RATE: 82 BPM

## 2023-02-23 PROCEDURE — 74011250637 HC RX REV CODE- 250/637: Performed by: EMERGENCY MEDICINE

## 2023-02-23 RX ORDER — ACETAMINOPHEN 500 MG
1000 TABLET ORAL ONCE
Status: COMPLETED | OUTPATIENT
Start: 2023-02-23 | End: 2023-02-23

## 2023-02-23 RX ORDER — LIDOCAINE 50 MG/G
PATCH TOPICAL
Qty: 15 EACH | Refills: 0 | Status: SHIPPED | OUTPATIENT
Start: 2023-02-23

## 2023-02-23 RX ADMIN — ACETAMINOPHEN 1000 MG: 500 TABLET ORAL at 01:02

## 2023-02-23 NOTE — ED TRIAGE NOTES
Pt arrived with daughter in wheelchair. Daughter reports pt was on camera and appeared to have back pain when she fell. They thought she had a UTI because she also seemed confused prior to falling . She was walking and fell, hitting her head. She tried to get up and hit her head a second time. Daughter reports feeling a bump on the back of her head. No blood thinners, unknown on LOC. Pt is having difficulty hearing, so daughter is supplementing information for her. Pt is reported to have had a positive home covid test and PCP called in a prescription for Paxlovid, which she finished.     Gio Hedrick RN

## 2023-02-23 NOTE — ED PROVIDER NOTES
Patient is an 80year-old who fell earlier today. Daughter reports that she has been having some increased confusion since recent illness and appeared to be having difficulty ambulating on her cameras. By the time she got to her mother her mother had fallen and hit her head. She did not lose consciousness and has not had any nausea or vomiting since the fall. She is not on any blood thinners. Patient reports lower back pain; she has not taken anything for her symptoms    The history is provided by the patient and a relative. Fall    Back Pain   Pertinent negatives include no dysuria.       Past Medical History:   Diagnosis Date    Depression     Essential hypertension     Hypercholesterolemia     Thyroid disease     hypothyroid    Thyroid disease        Past Surgical History:   Procedure Laterality Date    HX CATARACT REMOVAL Bilateral     HX DILATION AND CURETTAGE      more than once-can't remember    HX ORTHOPAEDIC Bilateral     sena's neuroma    HX ORTHOPAEDIC Right     Right finger     HX ROTATOR CUFF REPAIR Right     HX TONSILLECTOMY      HX UROLOGICAL      \"bladder lift\"         Family History:   Problem Relation Age of Onset    Stroke Mother     Cancer Father         Brain & Lung    Diabetes Sister     Stroke Sister     Diabetes Brother     Diabetes Maternal Grandmother     Cancer Sister     Heart Disease Brother        Social History     Socioeconomic History    Marital status:      Spouse name: Not on file    Number of children: Not on file    Years of education: Not on file    Highest education level: Not on file   Occupational History    Not on file   Tobacco Use    Smoking status: Never    Smokeless tobacco: Never   Substance and Sexual Activity    Alcohol use: No    Drug use: No    Sexual activity: Never   Other Topics Concern    Not on file   Social History Narrative    Not on file     Social Determinants of Health     Financial Resource Strain: Not on file   Food Insecurity: Not on file Transportation Needs: Not on file   Physical Activity: Not on file   Stress: Not on file   Social Connections: Not on file   Intimate Partner Violence: Not on file   Housing Stability: Not on file         ALLERGIES: Demerol [meperidine] and Pcn [penicillins]    Review of Systems   Genitourinary:  Negative for difficulty urinating and dysuria. Musculoskeletal:  Positive for back pain, gait problem and myalgias. Psychiatric/Behavioral:  Positive for confusion. All other systems reviewed and are negative. Vitals:    02/22/23 1949 02/23/23 0108   BP: (!) 145/66 139/76   Pulse:  82   Resp: 18 17   Temp: 99.4 °F (37.4 °C) 98.2 °F (36.8 °C)   SpO2: 95% 96%   Weight: 59.9 kg (132 lb)    Height: 5' 6\" (1.676 m)             Physical Exam  Vitals and nursing note reviewed. Constitutional:       Appearance: She is well-developed. She is not ill-appearing. HENT:      Head: Normocephalic and atraumatic. Eyes:      General: No scleral icterus. Cardiovascular:      Rate and Rhythm: Normal rate. Pulmonary:      Effort: Pulmonary effort is normal.   Abdominal:      General: There is no distension. Musculoskeletal:      Cervical back: Normal range of motion. Skin:     General: Skin is warm and dry. Capillary Refill: Capillary refill takes less than 2 seconds. Findings: No erythema or rash. Neurological:      General: No focal deficit present. Mental Status: She is alert. She is disoriented. Psychiatric:         Mood and Affect: Mood normal.         Behavior: Behavior normal.        Medical Decision Making  Amount and/or Complexity of Data Reviewed  Radiology: ordered. Risk  OTC drugs. Prescription drug management. Procedures        The patient presented with acute back pain. The patient is now resting comfortably and feels better, is alert, talkative, interactive and in no distress. The repeat examination is unremarkable and benign.  The patient is neurologically intact and is ambulatory in the ED. The patient has no fever, no bowel or bladder incontinence, no saddle anesthesia, and is otherwise alert and well-appearing. The history, physical examination, and diagnostics (if any) do not suggest the presence of acute spinal epidural abscess, acute spinal epidural bleed, cauda equina syndrome, abdominal aortic aneurysm, aortic dissection, pyelonephritis, sepsis, or other process requiring further testing, treatment or consultation in the emergency department. The patient has no intracranial hemorrhage from her fall. The vital signs have been stable. The patient's condition is stable and appropriate for discharge. The patient will pursue further outpatient evaluation with the primary care physician or other designated or consulting physician as indicated in the discharge instructions. MEDICATIONS GIVEN:  Medications   acetaminophen (TYLENOL) tablet 1,000 mg (1,000 mg Oral Given 2/23/23 0102)       IMPRESSION:  1. Acute midline low back pain without sciatica    2. Disorientation    3.  Closed head injury, initial encounter

## 2023-03-27 RX ORDER — THYROID, PORCINE 90 MG/1
TABLET ORAL
Qty: 30 TABLET | Refills: 0 | Status: SHIPPED | OUTPATIENT
Start: 2023-03-27

## 2023-03-27 NOTE — TELEPHONE ENCOUNTER
Tried to call patient, unable to leave voice message.  4:44 PM     Signed By: Angelito Lehman     March 27, 2023

## 2023-05-03 RX ORDER — THYROID, PORCINE 90 MG/1
TABLET ORAL
Qty: 30 TABLET | Refills: 0 | Status: SHIPPED | OUTPATIENT
Start: 2023-05-03

## 2023-05-16 RX ORDER — FLUTICASONE PROPIONATE 50 MCG
2 SPRAY, SUSPENSION (ML) NASAL DAILY
COMMUNITY
Start: 2021-03-17

## 2023-05-16 RX ORDER — CETIRIZINE HYDROCHLORIDE 10 MG/1
10 TABLET ORAL DAILY PRN
COMMUNITY
Start: 2021-03-17

## 2023-05-16 RX ORDER — SERTRALINE HYDROCHLORIDE 100 MG/1
1 TABLET, FILM COATED ORAL DAILY
COMMUNITY
Start: 2021-12-15

## 2023-05-16 RX ORDER — LIDOCAINE 50 MG/G
PATCH TOPICAL
COMMUNITY
Start: 2023-02-23

## 2023-05-16 RX ORDER — AMLODIPINE BESYLATE 5 MG/1
1 TABLET ORAL DAILY
COMMUNITY
Start: 2022-12-17

## 2023-05-16 RX ORDER — LEVOTHYROXINE AND LIOTHYRONINE 57; 13.5 UG/1; UG/1
1 TABLET ORAL DAILY
COMMUNITY
Start: 2023-05-03 | End: 2023-06-05

## 2023-05-16 RX ORDER — LOSARTAN POTASSIUM 100 MG/1
1 TABLET ORAL DAILY
COMMUNITY
Start: 2023-01-08

## 2023-06-05 ENCOUNTER — HOSPITAL ENCOUNTER (EMERGENCY)
Facility: HOSPITAL | Age: 88
Discharge: HOME | End: 2023-06-05
Attending: STUDENT IN AN ORGANIZED HEALTH CARE EDUCATION/TRAINING PROGRAM
Payer: MEDICARE

## 2023-06-05 ENCOUNTER — APPOINTMENT (OUTPATIENT)
Facility: HOSPITAL | Age: 88
End: 2023-06-05
Payer: MEDICARE

## 2023-06-05 VITALS
BODY MASS INDEX: 21.21 KG/M2 | WEIGHT: 132 LBS | SYSTOLIC BLOOD PRESSURE: 196 MMHG | HEART RATE: 75 BPM | TEMPERATURE: 98.3 F | HEIGHT: 66 IN | DIASTOLIC BLOOD PRESSURE: 75 MMHG | OXYGEN SATURATION: 96 % | RESPIRATION RATE: 16 BRPM

## 2023-06-05 DIAGNOSIS — R29.6 FREQUENT FALLS: Primary | ICD-10-CM

## 2023-06-05 DIAGNOSIS — F03.90 DEMENTIA, UNSPECIFIED DEMENTIA SEVERITY, UNSPECIFIED DEMENTIA TYPE, UNSPECIFIED WHETHER BEHAVIORAL, PSYCHOTIC, OR MOOD DISTURBANCE OR ANXIETY (HCC): ICD-10-CM

## 2023-06-05 DIAGNOSIS — S00.03XA HEMATOMA OF SCALP, INITIAL ENCOUNTER: ICD-10-CM

## 2023-06-05 LAB
ALBUMIN SERPL-MCNC: 3 G/DL (ref 3.5–5)
ALBUMIN/GLOB SERPL: 0.9 (ref 1.1–2.2)
ALP SERPL-CCNC: 113 U/L (ref 45–117)
ALT SERPL-CCNC: 24 U/L (ref 12–78)
ANION GAP SERPL CALC-SCNC: 3 MMOL/L (ref 5–15)
APPEARANCE UR: ABNORMAL
AST SERPL-CCNC: 21 U/L (ref 15–37)
BACTERIA URNS QL MICRO: NEGATIVE /HPF
BASOPHILS # BLD: 0.1 K/UL (ref 0–0.1)
BASOPHILS NFR BLD: 1 % (ref 0–1)
BILIRUB SERPL-MCNC: 0.3 MG/DL (ref 0.2–1)
BILIRUB UR QL: NEGATIVE
BUN SERPL-MCNC: 23 MG/DL (ref 6–20)
BUN/CREAT SERPL: 31 (ref 12–20)
CALCIUM SERPL-MCNC: 9.1 MG/DL (ref 8.5–10.1)
CHLORIDE SERPL-SCNC: 111 MMOL/L (ref 97–108)
CO2 SERPL-SCNC: 28 MMOL/L (ref 21–32)
COLOR UR: ABNORMAL
CREAT SERPL-MCNC: 0.74 MG/DL (ref 0.55–1.02)
DIFFERENTIAL METHOD BLD: NORMAL
EKG ATRIAL RATE: 69 BPM
EKG DIAGNOSIS: NORMAL
EKG P AXIS: 30 DEGREES
EKG P-R INTERVAL: 166 MS
EKG Q-T INTERVAL: 398 MS
EKG QRS DURATION: 62 MS
EKG QTC CALCULATION (BAZETT): 426 MS
EKG R AXIS: 8 DEGREES
EKG T AXIS: 41 DEGREES
EKG VENTRICULAR RATE: 69 BPM
EOSINOPHIL # BLD: 0.1 K/UL (ref 0–0.4)
EOSINOPHIL NFR BLD: 1 % (ref 0–7)
EPITH CASTS URNS QL MICRO: ABNORMAL /LPF
ERYTHROCYTE [DISTWIDTH] IN BLOOD BY AUTOMATED COUNT: 12 % (ref 11.5–14.5)
GLOBULIN SER CALC-MCNC: 3.5 G/DL (ref 2–4)
GLUCOSE SERPL-MCNC: 140 MG/DL (ref 65–100)
GLUCOSE UR STRIP.AUTO-MCNC: NEGATIVE MG/DL
HCT VFR BLD AUTO: 37.9 % (ref 35–47)
HGB BLD-MCNC: 12.8 G/DL (ref 11.5–16)
HGB UR QL STRIP: ABNORMAL
IMM GRANULOCYTES # BLD AUTO: 0 K/UL (ref 0–0.04)
IMM GRANULOCYTES NFR BLD AUTO: 0 % (ref 0–0.5)
KETONES UR QL STRIP.AUTO: NEGATIVE MG/DL
LEUKOCYTE ESTERASE UR QL STRIP.AUTO: ABNORMAL
LYMPHOCYTES # BLD: 2.1 K/UL (ref 0.8–3.5)
LYMPHOCYTES NFR BLD: 25 % (ref 12–49)
MCH RBC QN AUTO: 31.4 PG (ref 26–34)
MCHC RBC AUTO-ENTMCNC: 33.8 G/DL (ref 30–36.5)
MCV RBC AUTO: 92.9 FL (ref 80–99)
MONOCYTES # BLD: 0.8 K/UL (ref 0–1)
MONOCYTES NFR BLD: 10 % (ref 5–13)
NEUTS SEG # BLD: 5.3 K/UL (ref 1.8–8)
NEUTS SEG NFR BLD: 63 % (ref 32–75)
NITRITE UR QL STRIP.AUTO: NEGATIVE
NRBC # BLD: 0 K/UL (ref 0–0.01)
NRBC BLD-RTO: 0 PER 100 WBC
PH UR STRIP: 5.5 (ref 5–8)
PLATELET # BLD AUTO: 204 K/UL (ref 150–400)
PMV BLD AUTO: 11.6 FL (ref 8.9–12.9)
POTASSIUM SERPL-SCNC: 4.2 MMOL/L (ref 3.5–5.1)
PROT SERPL-MCNC: 6.5 G/DL (ref 6.4–8.2)
PROT UR STRIP-MCNC: NEGATIVE MG/DL
RBC # BLD AUTO: 4.08 M/UL (ref 3.8–5.2)
RBC #/AREA URNS HPF: ABNORMAL /HPF (ref 0–5)
SODIUM SERPL-SCNC: 142 MMOL/L (ref 136–145)
SP GR UR REFRACTOMETRY: 1.02 (ref 1–1.03)
SPECIMEN HOLD: NORMAL
TSH SERPL DL<=0.05 MIU/L-ACNC: 0.6 UIU/ML (ref 0.36–3.74)
UROBILINOGEN UR QL STRIP.AUTO: 0.2 EU/DL (ref 0.2–1)
WBC # BLD AUTO: 8.4 K/UL (ref 3.6–11)
WBC URNS QL MICRO: ABNORMAL /HPF (ref 0–4)

## 2023-06-05 PROCEDURE — 80053 COMPREHEN METABOLIC PANEL: CPT

## 2023-06-05 PROCEDURE — 99284 EMERGENCY DEPT VISIT MOD MDM: CPT

## 2023-06-05 PROCEDURE — 85025 COMPLETE CBC W/AUTO DIFF WBC: CPT

## 2023-06-05 PROCEDURE — 36415 COLL VENOUS BLD VENIPUNCTURE: CPT

## 2023-06-05 PROCEDURE — 70450 CT HEAD/BRAIN W/O DYE: CPT

## 2023-06-05 PROCEDURE — 81001 URINALYSIS AUTO W/SCOPE: CPT

## 2023-06-05 PROCEDURE — 84443 ASSAY THYROID STIM HORMONE: CPT

## 2023-06-05 PROCEDURE — 87086 URINE CULTURE/COLONY COUNT: CPT

## 2023-06-05 RX ORDER — CEPHALEXIN 500 MG/1
500 CAPSULE ORAL 3 TIMES DAILY
Qty: 15 CAPSULE | Refills: 0 | Status: SHIPPED | OUTPATIENT
Start: 2023-06-05 | End: 2023-06-10

## 2023-06-05 RX ORDER — THYROID,PORK 90 MG
TABLET ORAL
Qty: 30 TABLET | Refills: 0 | Status: SHIPPED | OUTPATIENT
Start: 2023-06-05

## 2023-06-05 ASSESSMENT — PAIN DESCRIPTION - DESCRIPTORS: DESCRIPTORS: ACHING

## 2023-06-05 ASSESSMENT — PAIN - FUNCTIONAL ASSESSMENT: PAIN_FUNCTIONAL_ASSESSMENT: 0-10

## 2023-06-05 ASSESSMENT — PAIN SCALES - GENERAL: PAINLEVEL_OUTOF10: 1

## 2023-06-05 ASSESSMENT — PAIN DESCRIPTION - LOCATION: LOCATION: HEAD

## 2023-06-05 NOTE — ED PROVIDER NOTES
[facility] EMERGENCY DEPT  EMERGENCY DEPARTMENT ENCOUNTER      Pt Name: Sylwia Leonard  MRN: [id number]  [place]ter 9/24/1933  Date of evaluation: 6/5/2023  Provider: Leopoldo Manners, MD    [address] [place] [address]       Chief Complaint   Patient presents with    Fall     HISTORY OF PRESENT ILLNESS   (Location/Symptom, Timing/Onset, Context/Setting, Quality, Duration, Modifying Factors, Severity)  Note limiting factors. HPI  81 yo F with pmhx of dementia, hypertension, hld, depression, thyroid disease, unsteady gait prone to falls, presents to the ER with daughter in law for evaluation s/p several recent falls with head trauma. Patient is not a good historian. Family report she fell on 5/31 and landed on her buttocks, but denies significant injury from this particular fall. Patient has been able to ambulate with walker since then. Patient fell again 2 days prior and struck the back of her head and report several \"lumps\" over her posterior scalp from this trauma. No LOC. PCP advised for patient to be evaluated in ED and have a scan of her head based on family's report of fall with head injury, as well as to check a urine to ensure no UTI. Patient denies falls being associated with chest pain, lightheadedness, dizziness, syncope, palpitations, shortness of breath. Family report patient has been progressively more confused, and thinks she has severe dementia. Also shares that patient has a history of dizziness and unsteady gait, which she takes meclizine for. This is unchanged and patient has been seen by PCP for this in the past.     Review of External Medical Records:     Nursing Notes were reviewed. REVIEW OF SYSTEMS    (2-9 systems for level 4, 10 or more for level 5)     Review of Systems   All other systems reviewed and are negative. Except as noted above the remainder of the review of systems was reviewed and negative.        PAST MEDICAL HISTORY     Past Medical History:   Diagnosis Date    Depression     Essential

## 2023-06-05 NOTE — ED TRIAGE NOTES
Patient presents with her family- reports patient lives in [facility] and has been found wandering the halls late at night    It is unknown when the patient fell- patient reportedly fell on 5/31 and landed on her buttocks- Patient also fell and struck her head at some point over the last few days as she was found to have a lump on her head and a blood on her pillow. Patient is apparently not safe where she lives and is \"on a list\" for a higher level of care.

## 2023-06-06 LAB
BACTERIA SPEC CULT: NORMAL
CC UR VC: NORMAL
SERVICE CMNT-IMP: NORMAL

## 2023-06-25 RX ORDER — THYROID,PORK 90 MG
TABLET ORAL
Qty: 30 TABLET | Refills: 0 | OUTPATIENT
Start: 2023-06-25

## 2023-07-19 ENCOUNTER — HOSPITAL ENCOUNTER (EMERGENCY)
Facility: HOSPITAL | Age: 88
Discharge: HOME | End: 2023-07-19
Attending: EMERGENCY MEDICINE
Payer: MEDICARE

## 2023-07-19 ENCOUNTER — APPOINTMENT (OUTPATIENT)
Facility: HOSPITAL | Age: 88
End: 2023-07-19
Payer: MEDICARE

## 2023-07-19 VITALS
TEMPERATURE: 97.5 F | BODY MASS INDEX: 21.21 KG/M2 | HEIGHT: 66 IN | HEART RATE: 61 BPM | WEIGHT: 132 LBS | OXYGEN SATURATION: 98 % | SYSTOLIC BLOOD PRESSURE: 190 MMHG | DIASTOLIC BLOOD PRESSURE: 80 MMHG | RESPIRATION RATE: 20 BRPM

## 2023-07-19 DIAGNOSIS — S29.9XXA CHEST INJURY, INITIAL ENCOUNTER: Primary | ICD-10-CM

## 2023-07-19 LAB
EKG ATRIAL RATE: 67 BPM
EKG DIAGNOSIS: NORMAL
EKG P AXIS: 71 DEGREES
EKG P-R INTERVAL: 182 MS
EKG Q-T INTERVAL: 420 MS
EKG QRS DURATION: 74 MS
EKG QTC CALCULATION (BAZETT): 443 MS
EKG R AXIS: 44 DEGREES
EKG T AXIS: 82 DEGREES
EKG VENTRICULAR RATE: 67 BPM

## 2023-07-19 PROCEDURE — 93010 ELECTROCARDIOGRAM REPORT: CPT | Performed by: STUDENT IN AN ORGANIZED HEALTH CARE EDUCATION/TRAINING PROGRAM

## 2023-07-19 PROCEDURE — 6370000000 HC RX 637 (ALT 250 FOR IP): Performed by: EMERGENCY MEDICINE

## 2023-07-19 PROCEDURE — 99284 EMERGENCY DEPT VISIT MOD MDM: CPT

## 2023-07-19 PROCEDURE — 71101 X-RAY EXAM UNILAT RIBS/CHEST: CPT

## 2023-07-19 PROCEDURE — 93005 ELECTROCARDIOGRAM TRACING: CPT | Performed by: EMERGENCY MEDICINE

## 2023-07-19 RX ORDER — LIDOCAINE 50 MG/G
PATCH TOPICAL
Qty: 30 PATCH | Refills: 0 | Status: SHIPPED | OUTPATIENT
Start: 2023-07-19

## 2023-07-19 RX ORDER — ACETAMINOPHEN 500 MG
1000 TABLET ORAL
Status: COMPLETED | OUTPATIENT
Start: 2023-07-19 | End: 2023-07-19

## 2023-07-19 RX ADMIN — ACETAMINOPHEN 1000 MG: 500 TABLET, FILM COATED ORAL at 14:11

## 2023-07-19 ASSESSMENT — ENCOUNTER SYMPTOMS
GASTROINTESTINAL NEGATIVE: 1
EYES NEGATIVE: 1
RESPIRATORY NEGATIVE: 1

## 2023-07-19 ASSESSMENT — PAIN SCALES - GENERAL
PAINLEVEL_OUTOF10: 10
PAINLEVEL_OUTOF10: 10

## 2023-07-19 ASSESSMENT — PAIN DESCRIPTION - ORIENTATION: ORIENTATION: RIGHT

## 2023-07-19 ASSESSMENT — PAIN - FUNCTIONAL ASSESSMENT: PAIN_FUNCTIONAL_ASSESSMENT: 0-10

## 2023-07-19 ASSESSMENT — PAIN DESCRIPTION - LOCATION: LOCATION: BREAST;RIB CAGE

## 2023-07-19 NOTE — ED TRIAGE NOTES
Pt arrives to ED from [facility] where pt fell after tripping when trying to stand. Pt reports right breast pain after landing on chest.  Pt denies LOC, didn't hit head.

## 2023-07-19 NOTE — DISCHARGE INSTRUCTIONS
You were seen in the emergency department for breast pain following a fall. The results of your tests were reassuring. Although an exact cause of your symptoms was not identified, the most likely cause is bruising. Please take any medications prescribed at this visit as instructed. Please follow-up with your PCP or return to the emergency department if you experience a worsening of symptoms or any new symptoms that are concerning to you.

## 2023-07-19 NOTE — ED PROVIDER NOTES
[facility] EMERGENCY DEPT  EMERGENCY DEPARTMENT ENCOUNTER      Pt Name: Italo Velasco  MRN: [id number]  [address] [facility] [place] 9/24/1933  Date of evaluation: 7/19/2023  Provider: Josse Sheets MD    [address]       Chief Complaint   Patient presents with    Fall    Breast Pain         HISTORY OF PRESENT ILLNESS   (Location/Symptom, Timing/Onset, Context/Setting, Quality, Duration, Modifying Factors, Severity)  Note limiting factors. 80-year-old female with PMHx of depression, hypertension, hypothyroidism presents to ED from [facility] where pt fell after tripping when trying to stand immediately prior to arrival.  Pt reports right breast pain after landing on chest.  Pt denies LOC, head strike. She has no additional complaints at this time    The history is provided by the patient. Review of External Medical Records:     Nursing Notes were reviewed. REVIEW OF SYSTEMS    (2-9 systems for level 4, 10 or more for level 5)     Review of Systems   Constitutional: Negative. HENT: Negative. Eyes: Negative. Respiratory: Negative. Cardiovascular:  Positive for chest pain. Gastrointestinal: Negative. Genitourinary: Negative. Musculoskeletal: Negative. Skin: Negative. Neurological: Negative. Psychiatric/Behavioral: Negative. Except as noted above the remainder of the review of systems was reviewed and negative.        PAST MEDICAL HISTORY     Past Medical History:   Diagnosis Date    Depression     Essential hypertension     Hypercholesterolemia     Thyroid disease     Thyroid disease     hypothyroid         SURGICAL HISTORY       Past Surgical History:   Procedure Laterality Date    CATARACT REMOVAL Bilateral     DILATION AND CURETTAGE OF UTERUS      more than once-can't remember    ORTHOPEDIC SURGERY Bilateral     baker's neuroma    ORTHOPEDIC SURGERY Right     Right finger     ROTATOR CUFF REPAIR Right     TONSILLECTOMY      UROLOGICAL SURGERY      \"bladder lift\"

## 2023-08-03 ENCOUNTER — APPOINTMENT (OUTPATIENT)
Facility: HOSPITAL | Age: 88
End: 2023-08-03
Payer: MEDICARE

## 2023-08-03 ENCOUNTER — HOSPITAL ENCOUNTER (OUTPATIENT)
Facility: HOSPITAL | Age: 88
Setting detail: OBSERVATION
Discharge: HOME | End: 2023-08-04
Attending: STUDENT IN AN ORGANIZED HEALTH CARE EDUCATION/TRAINING PROGRAM | Admitting: INTERNAL MEDICINE
Payer: MEDICARE

## 2023-08-03 DIAGNOSIS — H92.01 RIGHT EAR PAIN: ICD-10-CM

## 2023-08-03 DIAGNOSIS — M47.816 LUMBAR SPONDYLOSIS: ICD-10-CM

## 2023-08-03 DIAGNOSIS — N39.0 URINARY TRACT INFECTION WITH HEMATURIA, SITE UNSPECIFIED: ICD-10-CM

## 2023-08-03 DIAGNOSIS — R31.9 URINARY TRACT INFECTION WITH HEMATURIA, SITE UNSPECIFIED: ICD-10-CM

## 2023-08-03 PROBLEM — R41.0 CONFUSION: Status: ACTIVE | Noted: 2023-08-03

## 2023-08-03 PROBLEM — F03.90 DEMENTIA (HCC): Status: ACTIVE | Noted: 2023-08-03

## 2023-08-03 PROBLEM — I10 ESSENTIAL HYPERTENSION: Status: RESOLVED | Noted: 2017-08-31 | Resolved: 2023-08-03

## 2023-08-03 PROBLEM — I10 HYPERTENSION: Status: ACTIVE | Noted: 2017-08-31

## 2023-08-03 PROBLEM — M54.9 BACK PAIN: Status: ACTIVE | Noted: 2023-08-03

## 2023-08-03 PROBLEM — E78.5 DYSLIPIDEMIA: Status: RESOLVED | Noted: 2017-08-31 | Resolved: 2023-08-03

## 2023-08-03 PROBLEM — E03.9 ACQUIRED HYPOTHYROIDISM: Status: RESOLVED | Noted: 2017-08-31 | Resolved: 2023-08-03

## 2023-08-03 PROBLEM — R11.2 NAUSEA & VOMITING: Status: ACTIVE | Noted: 2023-08-03

## 2023-08-03 PROBLEM — E86.0 DEHYDRATION: Status: ACTIVE | Noted: 2023-08-03

## 2023-08-03 PROBLEM — R11.10 VOMITING: Status: ACTIVE | Noted: 2023-08-03

## 2023-08-03 PROBLEM — R63.4 WEIGHT LOSS: Status: ACTIVE | Noted: 2023-08-03

## 2023-08-03 PROBLEM — E03.9 HYPOTHYROID: Status: ACTIVE | Noted: 2017-08-31

## 2023-08-03 PROBLEM — F34.1 DYSTHYMIA: Status: RESOLVED | Noted: 2017-08-31 | Resolved: 2023-08-03

## 2023-08-03 PROBLEM — F41.9 ANXIETY AND DEPRESSION: Status: ACTIVE | Noted: 2023-08-03

## 2023-08-03 PROBLEM — F32.A ANXIETY AND DEPRESSION: Status: ACTIVE | Noted: 2023-08-03

## 2023-08-03 LAB
ALBUMIN SERPL-MCNC: 3.3 G/DL (ref 3.5–5)
ALBUMIN/GLOB SERPL: 0.9 (ref 1.1–2.2)
ALP SERPL-CCNC: 103 U/L (ref 45–117)
ALT SERPL-CCNC: 27 U/L (ref 12–78)
AMPHET UR QL SCN: NEGATIVE
ANION GAP SERPL CALC-SCNC: 4 MMOL/L (ref 5–15)
APPEARANCE UR: CLEAR
APPEARANCE UR: CLEAR
AST SERPL-CCNC: 23 U/L (ref 15–37)
B PERT DNA SPEC QL NAA+PROBE: NOT DETECTED
BACTERIA URNS QL MICRO: ABNORMAL /HPF
BACTERIA URNS QL MICRO: ABNORMAL /HPF
BARBITURATES UR QL SCN: NEGATIVE
BASOPHILS # BLD: 0 K/UL (ref 0–0.1)
BASOPHILS NFR BLD: 0 % (ref 0–1)
BENZODIAZ UR QL: NEGATIVE
BILIRUB SERPL-MCNC: 0.8 MG/DL (ref 0.2–1)
BILIRUB UR QL: NEGATIVE
BILIRUB UR QL: NEGATIVE
BORDETELLA PARAPERTUSSIS BY PCR: NOT DETECTED
BUN SERPL-MCNC: 24 MG/DL (ref 6–20)
BUN/CREAT SERPL: 30 (ref 12–20)
C PNEUM DNA SPEC QL NAA+PROBE: NOT DETECTED
CALCIUM SERPL-MCNC: 9.5 MG/DL (ref 8.5–10.1)
CANNABINOIDS UR QL SCN: NEGATIVE
CHLORIDE SERPL-SCNC: 110 MMOL/L (ref 97–108)
CO2 SERPL-SCNC: 28 MMOL/L (ref 21–32)
COCAINE UR QL SCN: NEGATIVE
COLOR UR: ABNORMAL
COLOR UR: ABNORMAL
COMMENT:: NORMAL
COMMENT:: NORMAL
CREAT SERPL-MCNC: 0.79 MG/DL (ref 0.55–1.02)
DIFFERENTIAL METHOD BLD: ABNORMAL
EOSINOPHIL # BLD: 0 K/UL (ref 0–0.4)
EOSINOPHIL NFR BLD: 0 % (ref 0–7)
EPITH CASTS URNS QL MICRO: ABNORMAL /LPF
EPITH CASTS URNS QL MICRO: ABNORMAL /LPF
ERYTHROCYTE [DISTWIDTH] IN BLOOD BY AUTOMATED COUNT: 11.9 % (ref 11.5–14.5)
FLUAV SUBTYP SPEC NAA+PROBE: NOT DETECTED
FLUBV RNA SPEC QL NAA+PROBE: NOT DETECTED
GLOBULIN SER CALC-MCNC: 3.7 G/DL (ref 2–4)
GLUCOSE SERPL-MCNC: 100 MG/DL (ref 65–100)
GLUCOSE UR STRIP.AUTO-MCNC: NEGATIVE MG/DL
GLUCOSE UR STRIP.AUTO-MCNC: NEGATIVE MG/DL
HADV DNA SPEC QL NAA+PROBE: NOT DETECTED
HCOV 229E RNA SPEC QL NAA+PROBE: NOT DETECTED
HCOV HKU1 RNA SPEC QL NAA+PROBE: NOT DETECTED
HCOV NL63 RNA SPEC QL NAA+PROBE: NOT DETECTED
HCOV OC43 RNA SPEC QL NAA+PROBE: NOT DETECTED
HCT VFR BLD AUTO: 40.2 % (ref 35–47)
HGB BLD-MCNC: 12.8 G/DL (ref 11.5–16)
HGB UR QL STRIP: ABNORMAL
HGB UR QL STRIP: NEGATIVE
HMPV RNA SPEC QL NAA+PROBE: NOT DETECTED
HPIV1 RNA SPEC QL NAA+PROBE: NOT DETECTED
HPIV2 RNA SPEC QL NAA+PROBE: NOT DETECTED
HPIV3 RNA SPEC QL NAA+PROBE: NOT DETECTED
HPIV4 RNA SPEC QL NAA+PROBE: NOT DETECTED
HYALINE CASTS URNS QL MICRO: ABNORMAL /LPF (ref 0–2)
HYALINE CASTS URNS QL MICRO: ABNORMAL /LPF (ref 0–2)
IMM GRANULOCYTES # BLD AUTO: 0 K/UL (ref 0–0.04)
IMM GRANULOCYTES NFR BLD AUTO: 0 % (ref 0–0.5)
KETONES UR QL STRIP.AUTO: ABNORMAL MG/DL
KETONES UR QL STRIP.AUTO: ABNORMAL MG/DL
LACTATE SERPL-SCNC: 0.9 MMOL/L (ref 0.4–2)
LEUKOCYTE ESTERASE UR QL STRIP.AUTO: ABNORMAL
LEUKOCYTE ESTERASE UR QL STRIP.AUTO: ABNORMAL
LYMPHOCYTES # BLD: 1.5 K/UL (ref 0.8–3.5)
LYMPHOCYTES NFR BLD: 15 % (ref 12–49)
Lab: NORMAL
M PNEUMO DNA SPEC QL NAA+PROBE: NOT DETECTED
MAGNESIUM SERPL-MCNC: 2.1 MG/DL (ref 1.6–2.4)
MCH RBC QN AUTO: 30.8 PG (ref 26–34)
MCHC RBC AUTO-ENTMCNC: 31.8 G/DL (ref 30–36.5)
MCV RBC AUTO: 96.9 FL (ref 80–99)
METHADONE UR QL: NEGATIVE
MONOCYTES # BLD: 0.6 K/UL (ref 0–1)
MONOCYTES NFR BLD: 6 % (ref 5–13)
NEUTS SEG # BLD: 7.8 K/UL (ref 1.8–8)
NEUTS SEG NFR BLD: 79 % (ref 32–75)
NITRITE UR QL STRIP.AUTO: NEGATIVE
NITRITE UR QL STRIP.AUTO: NEGATIVE
NRBC # BLD: 0 K/UL (ref 0–0.01)
NRBC BLD-RTO: 0 PER 100 WBC
OPIATES UR QL: NEGATIVE
PCP UR QL: NEGATIVE
PH UR STRIP: 6 (ref 5–8)
PH UR STRIP: 6 (ref 5–8)
PHOSPHATE SERPL-MCNC: 3.8 MG/DL (ref 2.6–4.7)
PLATELET # BLD AUTO: 166 K/UL (ref 150–400)
PMV BLD AUTO: 12.1 FL (ref 8.9–12.9)
POTASSIUM SERPL-SCNC: 4.4 MMOL/L (ref 3.5–5.1)
PROCALCITONIN SERPL-MCNC: <0.05 NG/ML
PROT SERPL-MCNC: 7 G/DL (ref 6.4–8.2)
PROT UR STRIP-MCNC: NEGATIVE MG/DL
PROT UR STRIP-MCNC: NEGATIVE MG/DL
RBC # BLD AUTO: 4.15 M/UL (ref 3.8–5.2)
RBC #/AREA URNS HPF: ABNORMAL /HPF (ref 0–5)
RBC #/AREA URNS HPF: ABNORMAL /HPF (ref 0–5)
RSV RNA SPEC QL NAA+PROBE: NOT DETECTED
RV+EV RNA SPEC QL NAA+PROBE: NOT DETECTED
SARS-COV-2 RNA RESP QL NAA+PROBE: NOT DETECTED
SODIUM SERPL-SCNC: 142 MMOL/L (ref 136–145)
SP GR UR REFRACTOMETRY: 1.01 (ref 1–1.03)
SP GR UR REFRACTOMETRY: 1.01 (ref 1–1.03)
SPECIMEN HOLD: NORMAL
SPECIMEN HOLD: NORMAL
TROPONIN I SERPL HS-MCNC: 11 NG/L (ref 0–51)
TSH SERPL DL<=0.05 MIU/L-ACNC: 0.18 UIU/ML (ref 0.36–3.74)
UROBILINOGEN UR QL STRIP.AUTO: 0.2 EU/DL (ref 0.2–1)
UROBILINOGEN UR QL STRIP.AUTO: 0.2 EU/DL (ref 0.2–1)
WBC # BLD AUTO: 10 K/UL (ref 3.6–11)
WBC URNS QL MICRO: ABNORMAL /HPF (ref 0–4)
WBC URNS QL MICRO: ABNORMAL /HPF (ref 0–4)

## 2023-08-03 PROCEDURE — 84484 ASSAY OF TROPONIN QUANT: CPT

## 2023-08-03 PROCEDURE — 96375 TX/PRO/DX INJ NEW DRUG ADDON: CPT

## 2023-08-03 PROCEDURE — 96374 THER/PROPH/DIAG INJ IV PUSH: CPT

## 2023-08-03 PROCEDURE — 99285 EMERGENCY DEPT VISIT HI MDM: CPT

## 2023-08-03 PROCEDURE — 83735 ASSAY OF MAGNESIUM: CPT

## 2023-08-03 PROCEDURE — 80307 DRUG TEST PRSMV CHEM ANLYZR: CPT

## 2023-08-03 PROCEDURE — 87086 URINE CULTURE/COLONY COUNT: CPT

## 2023-08-03 PROCEDURE — 96372 THER/PROPH/DIAG INJ SC/IM: CPT

## 2023-08-03 PROCEDURE — 83605 ASSAY OF LACTIC ACID: CPT

## 2023-08-03 PROCEDURE — 74177 CT ABD & PELVIS W/CONTRAST: CPT

## 2023-08-03 PROCEDURE — C9113 INJ PANTOPRAZOLE SODIUM, VIA: HCPCS | Performed by: INTERNAL MEDICINE

## 2023-08-03 PROCEDURE — 85025 COMPLETE CBC W/AUTO DIFF WBC: CPT

## 2023-08-03 PROCEDURE — G0378 HOSPITAL OBSERVATION PER HR: HCPCS

## 2023-08-03 PROCEDURE — 84145 PROCALCITONIN (PCT): CPT

## 2023-08-03 PROCEDURE — 71045 X-RAY EXAM CHEST 1 VIEW: CPT

## 2023-08-03 PROCEDURE — A4216 STERILE WATER/SALINE, 10 ML: HCPCS | Performed by: INTERNAL MEDICINE

## 2023-08-03 PROCEDURE — 84443 ASSAY THYROID STIM HORMONE: CPT

## 2023-08-03 PROCEDURE — 36415 COLL VENOUS BLD VENIPUNCTURE: CPT

## 2023-08-03 PROCEDURE — 84100 ASSAY OF PHOSPHORUS: CPT

## 2023-08-03 PROCEDURE — 6360000004 HC RX CONTRAST MEDICATION: Performed by: STUDENT IN AN ORGANIZED HEALTH CARE EDUCATION/TRAINING PROGRAM

## 2023-08-03 PROCEDURE — 94761 N-INVAS EAR/PLS OXIMETRY MLT: CPT

## 2023-08-03 PROCEDURE — 0202U NFCT DS 22 TRGT SARS-COV-2: CPT

## 2023-08-03 PROCEDURE — 6370000000 HC RX 637 (ALT 250 FOR IP): Performed by: INTERNAL MEDICINE

## 2023-08-03 PROCEDURE — 80053 COMPREHEN METABOLIC PANEL: CPT

## 2023-08-03 PROCEDURE — 70450 CT HEAD/BRAIN W/O DYE: CPT

## 2023-08-03 PROCEDURE — 81001 URINALYSIS AUTO W/SCOPE: CPT

## 2023-08-03 PROCEDURE — 6360000002 HC RX W HCPCS: Performed by: INTERNAL MEDICINE

## 2023-08-03 PROCEDURE — 2580000003 HC RX 258: Performed by: INTERNAL MEDICINE

## 2023-08-03 PROCEDURE — 6360000002 HC RX W HCPCS: Performed by: NURSE PRACTITIONER

## 2023-08-03 RX ORDER — LIDOCAINE 4 G/G
1 PATCH TOPICAL DAILY
Status: DISCONTINUED | OUTPATIENT
Start: 2023-08-03 | End: 2023-08-04 | Stop reason: HOSPADM

## 2023-08-03 RX ORDER — SODIUM CHLORIDE 0.9 % (FLUSH) 0.9 %
5-40 SYRINGE (ML) INJECTION EVERY 12 HOURS SCHEDULED
Status: DISCONTINUED | OUTPATIENT
Start: 2023-08-03 | End: 2023-08-04 | Stop reason: HOSPADM

## 2023-08-03 RX ORDER — SODIUM CHLORIDE 9 MG/ML
INJECTION, SOLUTION INTRAVENOUS PRN
Status: DISCONTINUED | OUTPATIENT
Start: 2023-08-03 | End: 2023-08-04 | Stop reason: HOSPADM

## 2023-08-03 RX ORDER — LEVOFLOXACIN 5 MG/ML
750 INJECTION, SOLUTION INTRAVENOUS
Status: DISCONTINUED | OUTPATIENT
Start: 2023-08-03 | End: 2023-08-03

## 2023-08-03 RX ORDER — THYROID 30 MG/1
90 TABLET ORAL DAILY
Status: DISCONTINUED | OUTPATIENT
Start: 2023-08-04 | End: 2023-08-04 | Stop reason: HOSPADM

## 2023-08-03 RX ORDER — SODIUM CHLORIDE 0.9 % (FLUSH) 0.9 %
5-40 SYRINGE (ML) INJECTION PRN
Status: DISCONTINUED | OUTPATIENT
Start: 2023-08-03 | End: 2023-08-04 | Stop reason: HOSPADM

## 2023-08-03 RX ORDER — ONDANSETRON 2 MG/ML
4 INJECTION INTRAMUSCULAR; INTRAVENOUS EVERY 6 HOURS PRN
Status: DISCONTINUED | OUTPATIENT
Start: 2023-08-03 | End: 2023-08-04 | Stop reason: HOSPADM

## 2023-08-03 RX ORDER — NEOMYCIN SULFATE, POLYMYXIN B SULFATE AND HYDROCORTISONE 10; 3.5; 1 MG/ML; MG/ML; [USP'U]/ML
3 SUSPENSION/ DROPS AURICULAR (OTIC) EVERY 6 HOURS SCHEDULED
Status: DISCONTINUED | OUTPATIENT
Start: 2023-08-03 | End: 2023-08-03

## 2023-08-03 RX ORDER — LOSARTAN POTASSIUM 50 MG/1
100 TABLET ORAL DAILY
Status: DISCONTINUED | OUTPATIENT
Start: 2023-08-03 | End: 2023-08-04 | Stop reason: HOSPADM

## 2023-08-03 RX ORDER — CETIRIZINE HYDROCHLORIDE 10 MG/1
5 TABLET ORAL DAILY
Status: DISCONTINUED | OUTPATIENT
Start: 2023-08-04 | End: 2023-08-04 | Stop reason: HOSPADM

## 2023-08-03 RX ORDER — POLYETHYLENE GLYCOL 3350 17 G/17G
17 POWDER, FOR SOLUTION ORAL DAILY PRN
Status: DISCONTINUED | OUTPATIENT
Start: 2023-08-03 | End: 2023-08-04 | Stop reason: HOSPADM

## 2023-08-03 RX ORDER — 0.9 % SODIUM CHLORIDE 0.9 %
1000 INTRAVENOUS SOLUTION INTRAVENOUS ONCE
Status: COMPLETED | OUTPATIENT
Start: 2023-08-03 | End: 2023-08-03

## 2023-08-03 RX ORDER — HYDRALAZINE HYDROCHLORIDE 20 MG/ML
10 INJECTION INTRAMUSCULAR; INTRAVENOUS EVERY 6 HOURS PRN
Status: DISCONTINUED | OUTPATIENT
Start: 2023-08-03 | End: 2023-08-04 | Stop reason: HOSPADM

## 2023-08-03 RX ORDER — ACETAMINOPHEN 650 MG/1
650 SUPPOSITORY RECTAL EVERY 6 HOURS PRN
Status: DISCONTINUED | OUTPATIENT
Start: 2023-08-03 | End: 2023-08-04 | Stop reason: HOSPADM

## 2023-08-03 RX ORDER — ONDANSETRON 4 MG/1
4 TABLET, ORALLY DISINTEGRATING ORAL EVERY 8 HOURS PRN
Status: DISCONTINUED | OUTPATIENT
Start: 2023-08-03 | End: 2023-08-04 | Stop reason: HOSPADM

## 2023-08-03 RX ORDER — AMLODIPINE BESYLATE 5 MG/1
5 TABLET ORAL DAILY
Status: DISCONTINUED | OUTPATIENT
Start: 2023-08-03 | End: 2023-08-04 | Stop reason: HOSPADM

## 2023-08-03 RX ORDER — CIPROFLOXACIN AND FLUOCINOLONE ACETONIDE .75; .0625 MG/.25ML; MG/.25ML
0.25 SOLUTION AURICULAR (OTIC) 2 TIMES DAILY
Status: DISCONTINUED | OUTPATIENT
Start: 2023-08-03 | End: 2023-08-04 | Stop reason: HOSPADM

## 2023-08-03 RX ORDER — ENOXAPARIN SODIUM 100 MG/ML
40 INJECTION SUBCUTANEOUS DAILY
Status: DISCONTINUED | OUTPATIENT
Start: 2023-08-03 | End: 2023-08-04 | Stop reason: HOSPADM

## 2023-08-03 RX ORDER — ACETAMINOPHEN 325 MG/1
650 TABLET ORAL EVERY 6 HOURS PRN
Status: DISCONTINUED | OUTPATIENT
Start: 2023-08-03 | End: 2023-08-04 | Stop reason: HOSPADM

## 2023-08-03 RX ORDER — SODIUM CHLORIDE 9 MG/ML
INJECTION, SOLUTION INTRAVENOUS CONTINUOUS
Status: DISCONTINUED | OUTPATIENT
Start: 2023-08-03 | End: 2023-08-04 | Stop reason: HOSPADM

## 2023-08-03 RX ADMIN — ENOXAPARIN SODIUM 40 MG: 100 INJECTION SUBCUTANEOUS at 18:44

## 2023-08-03 RX ADMIN — SODIUM CHLORIDE 40 MG: 9 INJECTION INTRAMUSCULAR; INTRAVENOUS; SUBCUTANEOUS at 16:34

## 2023-08-03 RX ADMIN — SODIUM CHLORIDE: 9 INJECTION, SOLUTION INTRAVENOUS at 18:44

## 2023-08-03 RX ADMIN — SODIUM CHLORIDE 1000 ML: 9 INJECTION, SOLUTION INTRAVENOUS at 16:35

## 2023-08-03 RX ADMIN — AMLODIPINE BESYLATE 5 MG: 5 TABLET ORAL at 16:29

## 2023-08-03 RX ADMIN — HYDRALAZINE HYDROCHLORIDE 10 MG: 20 INJECTION, SOLUTION INTRAMUSCULAR; INTRAVENOUS at 21:23

## 2023-08-03 RX ADMIN — IOPAMIDOL 100 ML: 755 INJECTION, SOLUTION INTRAVENOUS at 13:29

## 2023-08-03 RX ADMIN — LOSARTAN POTASSIUM 100 MG: 50 TABLET, FILM COATED ORAL at 16:29

## 2023-08-03 ASSESSMENT — ENCOUNTER SYMPTOMS
VOMITING: 1
ABDOMINAL PAIN: 0
SHORTNESS OF BREATH: 0
BACK PAIN: 0

## 2023-08-03 ASSESSMENT — PAIN SCALES - GENERAL
PAINLEVEL_OUTOF10: 0
PAINLEVEL_OUTOF10: 3

## 2023-08-03 NOTE — ED PROVIDER NOTES
[facility] EMERGENCY DEPT  EMERGENCY DEPARTMENT ENCOUNTER      Pt Name: Carlitos Rain  MRN: [id number]  [address] [facility] [place] 9/24/1933  Date of evaluation: 8/3/2023  Provider: Tramaine Diana       Chief Complaint   Patient presents with    Other         HISTORY OF PRESENT ILLNESS   (Location/Symptom, Timing/Onset, Context/Setting, Quality, Duration, Modifying Factors, Severity)  Note limiting factors. 60-year-old female brought from outside nursing facility for several different symptoms. She was brought from [address] [place] [address]. Apparently per EMS patient approached nursing station earlier this morning and reported that she did not feel well and had 1 episode of vomiting at the nurses station. Patient has a baseline confusion per nursing home staff but is more confused than usual.  It was reported the patient was hypotensive but per EMS blood pressure was in the 492Y systolic. EMS reports no history of recent falls. Patient is otherwise confused does not know where she is or have any complaints at this time          Review of External Medical Records:     Nursing Notes were reviewed. REVIEW OF SYSTEMS    (2-9 systems for level 4, 10 or more for level 5)     Review of Systems   Constitutional:  Negative for fever. Respiratory:  Negative for shortness of breath. Cardiovascular:  Negative for chest pain. Gastrointestinal:  Positive for vomiting. Negative for abdominal pain. Musculoskeletal:  Negative for back pain. Neurological:  Negative for weakness, numbness and headaches. Psychiatric/Behavioral:  Positive for confusion. Except as noted above the remainder of the review of systems was reviewed and negative.        PAST MEDICAL HISTORY     Past Medical History:   Diagnosis Date    Depression     Essential hypertension     Hypercholesterolemia     Thyroid disease     Thyroid disease     hypothyroid         SURGICAL HISTORY       Past Surgical History:   Procedure Head: Normocephalic and atraumatic. Right Ear: Tenderness present. Left Ear: Tympanic membrane, ear canal and external ear normal.      Ears:      Comments: Erythema of the right ear canal. Unable to see the TM. Dried blood surrounding the outer aspect of the canal.     Mouth/Throat:      Mouth: Mucous membranes are moist.   Eyes:      Extraocular Movements: Extraocular movements intact. Pupils: Pupils are equal, round, and reactive to light. Cardiovascular:      Rate and Rhythm: Normal rate and regular rhythm. Heart sounds: Normal heart sounds. Pulmonary:      Effort: Pulmonary effort is normal.      Breath sounds: Normal breath sounds. Abdominal:      General: Abdomen is flat. Bowel sounds are normal.      Palpations: Abdomen is soft. Skin:     General: Skin is warm. Capillary Refill: Capillary refill takes less than 2 seconds. Neurological:      Mental Status: She is alert. She is disoriented. Sensory: No sensory deficit. Motor: No weakness. DIAGNOSTIC RESULTS     EKG: All EKG's are interpreted by the Emergency Department Physician who either signs or Co-signs this chart in the absence of a cardiologist.        RADIOLOGY:   Non-plain film images such as CT, Ultrasound and MRI are read by the radiologist. Plain radiographic images are visualized and preliminarily interpreted by the emergency physician with the below findings:        Interpretation per the Radiologist below, if available at the time of this note:    CT ABDOMEN PELVIS W IV CONTRAST Additional Contrast? None   Final Result   No acute process. CT HEAD WO CONTRAST   Final Result   No acute intracranial abnormality. XR CHEST PORTABLE   Final Result   No acute cardiopulmonary process.                LABS:  Labs Reviewed   COMPREHENSIVE METABOLIC PANEL - Abnormal; Notable for the following components:       Result Value    Chloride 110 (*)     Anion Gap 4 (*)     BUN 24 (*)     Bun/Cre

## 2023-08-03 NOTE — H&P
[address] [place], [place] [address] [phone]    Hospitalist Admission Note      NAME:  Chip Mccord   :   1933   MRN:  [id number]     PCP:  Florence Beltran MD     Date/Time of service:  8/3/2023 3:40 PM  To assist coordination of care and communication with nursing and staff, this note may be preliminary early in the day, but finalized by end of the day. Subjective:     CHIEF COMPLAINT: vomiting     HISTORY OF PRESENT ILLNESS:     Ms. Paola Zuleta is a 80 y.o. female who presented to the Emergency Department complaining of vomiting. She provides no hx due to dementia. Staff noted vomiting and worsen than baseline confusion. ER workup only shows mild dehydration. She is currently comfortable, eating almonds and drinking a boost. We will admit her for observation.     Past Medical History:   Diagnosis Date    Anxiety and depression     Back pain     Dementia (HCC)     Hypercholesterolemia     Hypertension     Hypothyroid     Weight loss         Past Surgical History:   Procedure Laterality Date    CATARACT REMOVAL Bilateral     DILATION AND CURETTAGE OF UTERUS      more than once-can't remember    ORTHOPEDIC SURGERY Bilateral     baker's neuroma    ORTHOPEDIC SURGERY Right     Right finger     ROTATOR CUFF REPAIR Right     TONSILLECTOMY      UROLOGICAL SURGERY      \"bladder lift\"       Social History     Tobacco Use    Smoking status: Never    Smokeless tobacco: Never   Substance Use Topics    Alcohol use: No        Family History   Problem Relation Age of Onset    Heart Disease Brother     Cancer Sister     Diabetes Brother     Stroke Sister     Diabetes Sister     Cancer Father         Brain & Lung    Stroke Mother     Diabetes Maternal Grandmother         Allergies   Allergen Reactions    Meperidine Other (See Comments)     Childhood reaction      Nitrofurantoin Nausea Only     Chills, headache    Penicillins Hives        Prior to Admission

## 2023-08-03 NOTE — ED TRIAGE NOTES
Pt arrives via EMS from [facility]. Pt approached nursing stations this morning at facility and reported that she did not feel well and then had an episode of vomiting. Nursing staff from facility states that patient seems more confused than normal and stated that patient was hypotensive. Pt SBP in the 150's with EMS. Pt does have a history of confusion at baseline. Denies any recent falls.

## 2023-08-04 VITALS
RESPIRATION RATE: 16 BRPM | OXYGEN SATURATION: 98 % | BODY MASS INDEX: 23.05 KG/M2 | DIASTOLIC BLOOD PRESSURE: 70 MMHG | HEIGHT: 64 IN | TEMPERATURE: 97.8 F | SYSTOLIC BLOOD PRESSURE: 157 MMHG | HEART RATE: 62 BPM | WEIGHT: 135 LBS

## 2023-08-04 LAB
ALBUMIN SERPL-MCNC: 3 G/DL (ref 3.5–5)
ALBUMIN/GLOB SERPL: 0.9 (ref 1.1–2.2)
ALP SERPL-CCNC: 86 U/L (ref 45–117)
ALT SERPL-CCNC: 23 U/L (ref 12–78)
ANION GAP SERPL CALC-SCNC: 5 MMOL/L (ref 5–15)
AST SERPL-CCNC: 17 U/L (ref 15–37)
BACTERIA SPEC CULT: NORMAL
BILIRUB SERPL-MCNC: 0.5 MG/DL (ref 0.2–1)
BUN SERPL-MCNC: 21 MG/DL (ref 6–20)
BUN/CREAT SERPL: 36 (ref 12–20)
CALCIUM SERPL-MCNC: 9.2 MG/DL (ref 8.5–10.1)
CC UR VC: NORMAL
CHLORIDE SERPL-SCNC: 112 MMOL/L (ref 97–108)
CO2 SERPL-SCNC: 24 MMOL/L (ref 21–32)
CREAT SERPL-MCNC: 0.58 MG/DL (ref 0.55–1.02)
ERYTHROCYTE [DISTWIDTH] IN BLOOD BY AUTOMATED COUNT: 12 % (ref 11.5–14.5)
GLOBULIN SER CALC-MCNC: 3.4 G/DL (ref 2–4)
GLUCOSE SERPL-MCNC: 109 MG/DL (ref 65–100)
HCT VFR BLD AUTO: 41.5 % (ref 35–47)
HGB BLD-MCNC: 13.4 G/DL (ref 11.5–16)
LACTATE SERPL-SCNC: 0.8 MMOL/L (ref 0.4–2)
MAGNESIUM SERPL-MCNC: 2.2 MG/DL (ref 1.6–2.4)
MCH RBC QN AUTO: 31.2 PG (ref 26–34)
MCHC RBC AUTO-ENTMCNC: 32.3 G/DL (ref 30–36.5)
MCV RBC AUTO: 96.7 FL (ref 80–99)
NRBC # BLD: 0 K/UL (ref 0–0.01)
NRBC BLD-RTO: 0 PER 100 WBC
PHOSPHATE SERPL-MCNC: 3.2 MG/DL (ref 2.6–4.7)
PLATELET # BLD AUTO: 170 K/UL (ref 150–400)
PMV BLD AUTO: 12.2 FL (ref 8.9–12.9)
POTASSIUM SERPL-SCNC: 3.5 MMOL/L (ref 3.5–5.1)
PROT SERPL-MCNC: 6.4 G/DL (ref 6.4–8.2)
RBC # BLD AUTO: 4.29 M/UL (ref 3.8–5.2)
SERVICE CMNT-IMP: NORMAL
SODIUM SERPL-SCNC: 141 MMOL/L (ref 136–145)
WBC # BLD AUTO: 11 K/UL (ref 3.6–11)

## 2023-08-04 PROCEDURE — 85027 COMPLETE CBC AUTOMATED: CPT

## 2023-08-04 PROCEDURE — 97161 PT EVAL LOW COMPLEX 20 MIN: CPT

## 2023-08-04 PROCEDURE — G0378 HOSPITAL OBSERVATION PER HR: HCPCS

## 2023-08-04 PROCEDURE — 6360000002 HC RX W HCPCS: Performed by: NURSE PRACTITIONER

## 2023-08-04 PROCEDURE — 6370000000 HC RX 637 (ALT 250 FOR IP): Performed by: INTERNAL MEDICINE

## 2023-08-04 PROCEDURE — 83605 ASSAY OF LACTIC ACID: CPT

## 2023-08-04 PROCEDURE — 36415 COLL VENOUS BLD VENIPUNCTURE: CPT

## 2023-08-04 PROCEDURE — 96376 TX/PRO/DX INJ SAME DRUG ADON: CPT

## 2023-08-04 PROCEDURE — 84100 ASSAY OF PHOSPHORUS: CPT

## 2023-08-04 PROCEDURE — 97165 OT EVAL LOW COMPLEX 30 MIN: CPT | Performed by: OCCUPATIONAL THERAPIST

## 2023-08-04 PROCEDURE — 80053 COMPREHEN METABOLIC PANEL: CPT

## 2023-08-04 PROCEDURE — 6360000002 HC RX W HCPCS: Performed by: INTERNAL MEDICINE

## 2023-08-04 PROCEDURE — 97535 SELF CARE MNGMENT TRAINING: CPT | Performed by: OCCUPATIONAL THERAPIST

## 2023-08-04 PROCEDURE — 97530 THERAPEUTIC ACTIVITIES: CPT

## 2023-08-04 PROCEDURE — A4216 STERILE WATER/SALINE, 10 ML: HCPCS | Performed by: INTERNAL MEDICINE

## 2023-08-04 PROCEDURE — C9113 INJ PANTOPRAZOLE SODIUM, VIA: HCPCS | Performed by: INTERNAL MEDICINE

## 2023-08-04 PROCEDURE — 83735 ASSAY OF MAGNESIUM: CPT

## 2023-08-04 PROCEDURE — 2580000003 HC RX 258: Performed by: INTERNAL MEDICINE

## 2023-08-04 RX ORDER — 0.9 % SODIUM CHLORIDE 0.9 %
500 INTRAVENOUS SOLUTION INTRAVENOUS ONCE
Status: COMPLETED | OUTPATIENT
Start: 2023-08-04 | End: 2023-08-04

## 2023-08-04 RX ORDER — HYDRALAZINE HYDROCHLORIDE 20 MG/ML
10 INJECTION INTRAMUSCULAR; INTRAVENOUS ONCE
Status: COMPLETED | OUTPATIENT
Start: 2023-08-04 | End: 2023-08-04

## 2023-08-04 RX ORDER — BUSPIRONE HYDROCHLORIDE 5 MG/1
5 TABLET ORAL 3 TIMES DAILY
COMMUNITY

## 2023-08-04 RX ORDER — CIPROFLOXACIN AND FLUOCINOLONE ACETONIDE .75; .0625 MG/.25ML; MG/.25ML
0.25 SOLUTION AURICULAR (OTIC) 2 TIMES DAILY
Qty: 182 EACH | Refills: 0 | Status: SHIPPED | OUTPATIENT
Start: 2023-08-04 | End: 2023-08-11

## 2023-08-04 RX ADMIN — SODIUM CHLORIDE 40 MG: 9 INJECTION INTRAMUSCULAR; INTRAVENOUS; SUBCUTANEOUS at 09:31

## 2023-08-04 RX ADMIN — ACETAMINOPHEN 650 MG: 325 TABLET ORAL at 01:31

## 2023-08-04 RX ADMIN — CIPROFLOXACIN AND FLUOCINOLONE ACETONIDE 0.25 ML: .75; .0625 SOLUTION AURICULAR (OTIC) at 09:35

## 2023-08-04 RX ADMIN — HYDRALAZINE HYDROCHLORIDE 10 MG: 20 INJECTION, SOLUTION INTRAMUSCULAR; INTRAVENOUS at 01:31

## 2023-08-04 RX ADMIN — SERTRALINE 100 MG: 50 TABLET, FILM COATED ORAL at 09:32

## 2023-08-04 RX ADMIN — SODIUM CHLORIDE, PRESERVATIVE FREE 10 ML: 5 INJECTION INTRAVENOUS at 09:33

## 2023-08-04 RX ADMIN — SODIUM CHLORIDE, PRESERVATIVE FREE 10 ML: 5 INJECTION INTRAVENOUS at 01:32

## 2023-08-04 RX ADMIN — AMLODIPINE BESYLATE 5 MG: 5 TABLET ORAL at 09:32

## 2023-08-04 RX ADMIN — SODIUM CHLORIDE 500 ML: 9 INJECTION, SOLUTION INTRAVENOUS at 13:02

## 2023-08-04 RX ADMIN — LOSARTAN POTASSIUM 100 MG: 50 TABLET, FILM COATED ORAL at 09:31

## 2023-08-04 RX ADMIN — LEVOTHYROXINE, LIOTHYRONINE 90 MG: 19; 4.5 TABLET ORAL at 09:32

## 2023-08-04 ASSESSMENT — PAIN DESCRIPTION - LOCATION: LOCATION: EAR

## 2023-08-04 ASSESSMENT — PAIN - FUNCTIONAL ASSESSMENT: PAIN_FUNCTIONAL_ASSESSMENT: ACTIVITIES ARE NOT PREVENTED

## 2023-08-04 ASSESSMENT — PAIN DESCRIPTION - ORIENTATION: ORIENTATION: RIGHT

## 2023-08-04 ASSESSMENT — PAIN DESCRIPTION - FREQUENCY: FREQUENCY: CONTINUOUS

## 2023-08-04 ASSESSMENT — PAIN SCALES - WONG BAKER: WONGBAKER_NUMERICALRESPONSE: 2

## 2023-08-04 ASSESSMENT — PAIN DESCRIPTION - ONSET: ONSET: ON-GOING

## 2023-08-04 ASSESSMENT — PAIN DESCRIPTION - PAIN TYPE: TYPE: ACUTE PAIN

## 2023-08-04 ASSESSMENT — PAIN DESCRIPTION - DESCRIPTORS: DESCRIPTORS: ACHING;OTHER (COMMENT)

## 2023-08-04 NOTE — ED NOTES
TRANSFER - OUT REPORT:    Verbal report given to TRISTAN Bingham on [facility]  being transferred to 4th floor, room 418 for routine progression of patient care       Report consisted of patient's Situation, Background, Assessment and   Recommendations(SBAR). Information from the following report(s) Nurse Handoff Report, ED Encounter Summary, MAR, Recent Results, and Neuro Assessment was reviewed with the receiving nurse. [place] Fall Assessment:    Presents to emergency department  because of falls (Syncope, seizure, or loss of consciousness): No  Age > 70: Yes  Altered Mental Status, Intoxication with alcohol or substance confusion (Disorientation, impaired judgment, poor safety awaremess, or inability to follow instructions): No  Impaired Mobility: Ambulates or transfers with assistive devices or assistance; Unable to ambulate or transer.: No  Nursing Judgement: No          Lines:   Peripheral IV 08/03/23 Distal;Right;Dorsal Cephalic (Active)        Opportunity for questions and clarification was provided.   ER charge RN assisted primary RN with report and discussed with TRISTAN Bingham to call     Patient transported with:  Shannan Apple RN  08/03/23 2017

## 2023-08-04 NOTE — PROGRESS NOTES
I have reviewed discharge instructions with the patient and daughter. The patient and daughter verbalized understanding. The opportunity for questions was presented.

## 2023-08-04 NOTE — CARE COORDINATION
08/04/23 1250   Service Assessment   Patient Orientation Other (see comment)  (Patient hard of hearing and w/ a hx of dementia)   History Provided By Child/Family  (Daughter/MERARY Painter)   Primary Caregiver [address]; Other (Comment)  ([facility] staff)   Patient's Healthcare Decision Maker is: Named in [address]   PCP Verified by CM Yes   Prior Functional Level Independent in ADLs/IADLs   Current Functional Level Independent in ADLs/IADLs   Can patient return to prior living arrangement Yes   Family able to assist with home care needs: Yes   Financial Resources Medicare   Social/Functional History   Type of Home Assisted living   Home Equipment Cane;Rollator;Walker, rolling   Receives Help From Family  ([facility] staff)   ADL Assistance Independent   Ambulation Assistance Independent   Transfer Assistance Independent   Active  No   Discharge [place]   DME Ordered? Go Payer  (Daughter states patient has a rollator and a rw)   Type of [address] [facility] OT;PT   History of falls? 1   Services At/After Discharge   Transition of Care Consult (CM Consult) Home Health   Internal Home Health No   Reason Outside Agency Chosen Patient already serviced by other home care/hospice agency   [facility]     Patient's daughter/MERARY, Juan M Bedolla, provided information for initial assessment. Patient is a resident at [facility]. At baseline patient is iADLs, and uses a rollator. Order noted for a rolling walker, but daughter declined stating patient already has one. Patient is not currently receiving therapy at her [facility], but would benefit. Orders to be sent to Darryl José at [phone]. Daughter will transport and assist post discharge.

## 2023-08-04 NOTE — PLAN OF CARE
Problem: Physical Therapy - Adult  Goal: By Discharge: Performs mobility at highest level of function for planned discharge setting. See evaluation for individualized goals. Description: FUNCTIONAL STATUS PRIOR TO ADMISSION: Patient was modified independent using a rollator for functional mobility. Does not always utilize rollator in apartment. Living in Assisted living primarily for memory concerns. Daughter reports they provide her with her pills but patient is fairly independent otherwise. History of recent falls. HOME SUPPORT PRIOR TO ADMISSION: The patient lived in [address] [place] [address] in apartment alone. Daughter reports plan for family supervision/assist initially upon dc. Physical Therapy Goals  Initiated 8/4/2023  1. Patient will move from supine to sit and sit to supine, scoot up and down, and roll side to side in bed with modified independence within 7 day(s). 2.  Patient will perform sit to stand with modified independence within 7 day(s). 3.  Patient will transfer from bed to chair and chair to bed with modified independence using the least restrictive device within 7 day(s). 4.  Patient will ambulate with modified independence for 100 feet with the least restrictive device within 7 day(s). Outcome: Progressing     PHYSICAL THERAPY EVALUATION    Patient: Wu Lr (33 y.o. female)  Date: 8/4/2023  Primary Diagnosis: Nausea & vomiting [R11.2]  Right ear pain [H92.01]  Urinary tract infection with hematuria, site unspecified [N39.0, R31.9]       Precautions: Fall Risk, Bed Alarm                  ASSESSMENT :   DEFICITS/IMPAIRMENTS:   The patient is limited by decreased functional mobility, independence in ADLs, high-level IADLs, strength, activity tolerance, endurance, safety awareness, cognition, command following, attention/concentration, coordination, balance, posture, orthostatic hypotension. Arrived to patient standing at sink in bathroom with OT and reporting dizziness. patient    Activity Tolerance:   Fair  and signs and symptoms of orthostatic hypotension     08/04/23 1001 08/04/23 1003 08/04/23 1005   Vitals   Pulse 66 72 74   Heart Rate Source Monitor Monitor Monitor   BP (!) 147/67 (!) 106/52 111/74   MAP (Calculated) 94 70 86   BP Location Right upper arm Right upper arm Right upper arm   Patient Position Sitting Standing  (at sink) Standing  (post amb bathroom to bed)     After treatment:   Patient left in no apparent distress in bed, Call bell within reach, Bed/ chair alarm activated, Caregiver / family present, and Side rails x3    COMMUNICATION/EDUCATION:   The patient's plan of care was discussed with: occupational therapist and registered nurse    Patient Education  Education Given To: Patient; Family  Education Provided: Role of Therapy;Plan of Care; Fall Prevention Strategies; Family Education  Education Method: Verbal  Barriers to Learning: Cognition  Education Outcome:  (daughter reports understanding; patient will require continued education)    Thank you for this referral.  Minal Smart, PT  Minutes: 16

## 2023-08-04 NOTE — DISCHARGE SUMMARY
Physician Discharge Summary     Patient ID:  Ethan Sherman  [id number]  89 y.o.  9/24/1933    Admit date: 8/3/2023    Discharge date of service and time: 8/4/2023  Greater than 30 minutes were spent providing discharge related services for this patient    Admission Diagnoses: Nausea & vomiting [R11.2]  Right ear pain [H92.01]  Urinary tract infection with hematuria, site unspecified [N39.0, R31.9]    Discharge Diagnoses:    Principal Diagnosis     Ear pain                                             Hospital Course and other diagnoses  Ear pain - Family reports bleeding for days. No active current bleeding. CT head bland. No SIRS. Started cipro drops. Discussed with Dr Levar Rodriguez, who will see in his office next week. Nausea & vomiting / GERD (gastroesophageal reflux disease) - Resolved prior to arrival at ER. Added PPI. Likely due to ear pain, vs viral, though RVP negative. She got IVF, Prn zofran, ADAT. Confusion / Dementia / Anxiety and depression - Was worse then baseline due to dehydration and hospital delirium. Continue sertraline. She would benefit from outpatient neuropsych eval if not already done. Dehydration - POA, likely due to GI loss and poor PO intake. Hydrated and improved. Hypertension - POA, and now better after resuming losartan and Norvasc, but she does not take these at home     Weight loss - POA, I see >25lb unintentional wt loss in last 3 years. Likely related to dementia, but cancer not ruled out. Discussed with daughter     Lumbar spondylosis / Chronic back pain / Peripheral neuropathy / Debilitated patient - POA, tylenol prn. No longer using lidocaine patch. Return to [place]     Incomplete bladder emptying / Urge incontinence - POA, not on meds. Initial urine sample in ER was contaminated with epithelial cells, I ordered a cath urine but it is just as contaminated. Low suspicion of acute UTI. No SIRS criteria at all, undetectable procalcitonin.   No Abx warranted

## 2023-08-04 NOTE — DISCHARGE INSTRUCTIONS
Patient Discharge Instructions    Ankit De Jeuss / [id number] : 1933    Admitted 8/3/2023 Discharged: 2023     Primary Diagnoses  @Rprob@    Take Home Medications     It is important that you take the medication exactly as they are prescribed. Keep your medication in the bottles provided by the pharmacist and keep a list of the medication names, dosages, and times to be taken in your wallet. Do not take other medications without consulting your doctor. What to do at Home    Recommended diet: regular diet    Recommended activity: activity as tolerated    If you experience worse ear symptoms, please follow up with ENT, Dr Erick Lyon. Follow-up with your PCP in a few weeks    [unfilled]     Information obtained by :  I understand that if any problems occur once I am at home I am to contact my physician. I understand and acknowledge receipt of the instructions indicated above.                                                                                                                                            Physician's or R.N.'s Signature                                                                  Date/Time                                                                                                                                              Patient or Representative Signature                                                          Date/Time

## 2023-08-04 NOTE — PLAN OF CARE
Problem: Discharge Planning  Goal: Discharge to home or other facility with appropriate resources  Outcome: Progressing     Problem: Pain  Goal: Verbalizes/displays adequate comfort level or baseline comfort level  Outcome: Progressing     Problem: Confusion  Goal: Confusion, delirium, dementia, or psychosis is improved or at baseline  Description: INTERVENTIONS:  1. Assess for possible contributors to thought disturbance, including medications, impaired vision or hearing, underlying metabolic abnormalities, dehydration, psychiatric diagnoses, and notify attending LIP  2. [place] high risk fall precautions, as indicated  3. Provide frequent short contacts to provide reality reorientation, refocusing and direction  4. Decrease environmental stimuli, including noise as appropriate  5. Monitor and intervene to maintain adequate nutrition, hydration, elimination, sleep and activity  6. If unable to ensure safety without constant attention obtain sitter and review sitter guidelines with assigned personnel  7. Initiate Psychosocial CNS and Spiritual Care consult, as indicated  Outcome: Progressing     Problem: Safety - Adult  Goal: Free from fall injury  Outcome: Progressing     Problem: Physical Therapy - Adult  Goal: By Discharge: Performs mobility at highest level of function for planned discharge setting. See evaluation for individualized goals. Description: FUNCTIONAL STATUS PRIOR TO ADMISSION: Patient was modified independent using a rollator for functional mobility. Does not always utilize rollator in apartment. Living in Assisted living primarily for memory concerns. Daughter reports they provide her with her pills but patient is fairly independent otherwise. History of recent falls. HOME SUPPORT PRIOR TO ADMISSION: The patient lived in [address] [place] [address] in apartment alone. Daughter reports plan for family supervision/assist initially upon dc. Physical Therapy Goals  Initiated 8/4/2023  1.

## 2023-08-04 NOTE — ED NOTES
Report attempted to be called to floor, place don hold for 5+ min. Charge RN Calling back.       An Zhou RN  08/03/23 2007

## 2023-08-04 NOTE — PROGRESS NOTES
[address] [place] [address] 1788 [phone]    Hospitalist Progress Note      NAME: tIalo Velasco   :  1933  MRM:  [id number]    Date/Time of service 2023  7:28 AM    To assist coordination of care and communication with nursing and staff, this note may be preliminary early in the day, but finalized by end of the day. Assessment and Plan:     Nausea & vomiting / GERD (gastroesophageal reflux disease) - Resolved prior to arrival at ER. Added PPI. Likely due to ear pain, vs viral, though RVP negative. She got IVF, Prn zofran, ADAT. Ear pain - Family reports bleeding for days. No active current bleeding. CT head bland. No SIRS. Started cipro drops. Discussed with Dr Emil Rader, who will see in his office next week. Confusion / Dementia / Anxiety and depression - Was worse then baseline due to dehydration. Continue sertraline. She would benefit from outpatient neuropsych eval if not already done. Dehydration - POA, likely due to GI loss and poor PO intake. Hydrated and improved. Hypertension - POA, and now better after resuming losartan and Norvasc     Weight loss - POA, I see >25lb unintentional wt loss in last 3 years. Likely related to dementia, but cancer not ruled out. Discussed with daughter     Lumbar spondylosis / Chronic back pain / Peripheral neuropathy / Debilitated patient - POA, tylenol prn. Resume lidocaine patch. Return to [place]     Incomplete bladder emptying / Urge incontinence - POA, not on meds. Initial urine sample in ER was contaminated with epithelial cells, I ordered a cath urine but it is just as contaminated. Low suspicion of acute UTI. No SIRS criteria at all, undetectable procalcitonin. No Abx warranted      Hypothyroid - Continued armour thyroid, but low TSH. May no longer need this agent      Hypercholesterolemia - Not on meds. I would not test nor treat in this elderly woman.        Subjective:

## 2023-08-04 NOTE — DISCHARGE INSTR - COC
Continuity of Care Form    Patient Name: Denia Marroquin   :  1933  MRN:  [id number]    Admit date:  8/3/2023  Discharge date:  ***    Code Status Order: Full Code   Advance Directives:     Admitting Physician:  Sofia Dickson MD  PCP: Byron Tavares MD    Discharging Nurse: [facility] Unit/Room#: 0  Discharging Unit Phone Number: ***    Emergency Contact:   Extended Emergency Contact Information  Primary Emergency Contact: [facility] of [address] [address] Phone: [phone]  Mobile Phone: [phone]  Relation: Child  Secondary Emergency Contact: Shaista Hunter  Address: [address] [place]  [address] [address] [place] Phone: [phone]  Mobile Phone: [phone]  Relation: Child    Past Surgical History:  Past Surgical History:   Procedure Laterality Date    CATARACT REMOVAL Bilateral     DILATION AND CURETTAGE OF UTERUS      more than once-can't remember    ORTHOPEDIC SURGERY Bilateral     baker's neuroma    ORTHOPEDIC SURGERY Right     Right finger     ROTATOR CUFF REPAIR Right     TONSILLECTOMY      UROLOGICAL SURGERY      \"bladder lift\"       Immunization History:   Immunization History   Administered Date(s) Administered    COVID-19, PFIZER PURPLE top, DILUTE for use, (age 15 y+), 30mcg/0.3mL 2021, 2021    Influenza Trivalent 2004, 10/22/2005, 2006, 2007, 10/25/2008, 10/08/2009    Influenza Virus Vaccine 10/10/1994, 11/10/1995, 11/10/1997, 1998, 1999, 2000, 2001, 10/21/2002    Influenza, FLUARIX, FLULAVAL, FLUZONE (age 10 mo+) AND AFLURIA, (age 1 y+), PF, 0.5mL 2014, 2016    Influenza, High Dose (Fluzone 65 yrs and older) 2018, 11/15/2020    Influenza, Triv, inactivated, subunit, adjuvanted, IM (Fluad 65 yrs and older) 10/04/2019    Influenza, Trivalent PF 2011, 2013, 2015    PPD Test 2002    Pneumococcal, PCV-13, PREVNAR 15, (age 6w+), PDSV:846301857}  Med Delivery   [address] [place] [address] MED Delivery:155024854}    Wound Care Documentation and Therapy:        Elimination:  Continence: Bowel: {YES / XD:04265}  Bladder: {YES / GF:50392}  Urinary Catheter: {Urinary Catheter:374523787}   Colostomy/Ileostomy/Ileal Conduit: {YES / XC:02508}       Date of Last BM: ***    Intake/Output Summary (Last 24 hours) at 2023 0944  Last data filed at 8/3/2023 1836  Gross per 24 hour   Intake 1000 ml   Output --   Net 1000 ml     I/O last 3 completed shifts:   In: 1000 [IV Piggyback:1000]  Out: -     Safety Concerns:     [address] [place] [address] Safety Concerns:229314540}    Impairments/Disabilities:      [address] [place] [address] Impairments/Disabilities:715222795}    Nutrition Therapy:  Current Nutrition Therapy:   [address] [place] [address] Diet List:094933058}    Routes of Feeding: {[facility] DME Other Feedings:496245973}  Liquids: {Slp liquid thickness:24461}  Daily Fluid Restriction: {[facility] DME Yes amt example:823038471}  Last Modified Barium Swallow with Video (Video Swallowing Test): {Done Not Done PVSF:083489192}    Treatments at the Time of Hospital Discharge:   Respiratory Treatments: ***  Oxygen Therapy:  {Therapy; copd oxygen:99139}  Ventilator:    {MH CC Vent WSYT:407822078}    Rehab Therapies: {THERAPEUTIC INTERVENTION:9846220967}  Weight Bearing Status/Restrictions: [address] [place] [address] Weight Bearin}  Other Medical Equipment (for information only, NOT a DME order):  {EQUIPMENT:438885704}  Other Treatments: ***    Patient's personal belongings (please select all that are sent with patient):  {[facility] DME Belongings:197923180}    RN SIGNATURE:  {Esignature:610846282}    CASE MANAGEMENT/SOCIAL WORK SECTION    Inpatient Status Date: ***    Readmission Risk Assessment Score:  Readmission Risk              Risk of Unplanned Readmission:  0           Discharging to Facility/ Agency   Name:   Address:  Phone:  Fax:    Dialysis Facility (if applicable)   Name:  Address:  Dialysis Schedule:  Phone:  Fax:    /

## 2023-09-02 PROBLEM — E86.0 DEHYDRATION: Status: RESOLVED | Noted: 2023-08-03 | Resolved: 2023-09-02

## 2023-11-15 ENCOUNTER — HOSPITAL ENCOUNTER (EMERGENCY)
Facility: HOSPITAL | Age: 88
Discharge: HOME | End: 2023-11-15
Attending: EMERGENCY MEDICINE
Payer: MEDICARE

## 2023-11-15 ENCOUNTER — APPOINTMENT (OUTPATIENT)
Facility: HOSPITAL | Age: 88
End: 2023-11-15
Payer: MEDICARE

## 2023-11-15 VITALS
TEMPERATURE: 98.2 F | HEIGHT: 66 IN | BODY MASS INDEX: 23.3 KG/M2 | DIASTOLIC BLOOD PRESSURE: 102 MMHG | HEART RATE: 81 BPM | WEIGHT: 145 LBS | OXYGEN SATURATION: 96 % | SYSTOLIC BLOOD PRESSURE: 176 MMHG | RESPIRATION RATE: 16 BRPM
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
## 2023-11-15 DIAGNOSIS — S09.90XA CLOSED HEAD INJURY, INITIAL ENCOUNTER: Primary | ICD-10-CM

## 2023-11-15 LAB
ALBUMIN SERPL-MCNC: 3 G/DL (ref 3.5–5)
ALBUMIN/GLOB SERPL: 0.8 (ref 1.1–2.2)
ALP SERPL-CCNC: 80 U/L (ref 45–117)
ALT SERPL-CCNC: 26 U/L (ref 12–78)
ANION GAP SERPL CALC-SCNC: 4 MMOL/L (ref 5–15)
APPEARANCE UR: ABNORMAL
AST SERPL-CCNC: 17 U/L (ref 15–37)
BACTERIA URNS QL MICRO: NEGATIVE /HPF
BASOPHILS # BLD: 0.1 K/UL (ref 0–0.1)
BASOPHILS NFR BLD: 1 % (ref 0–1)
BILIRUB SERPL-MCNC: 0.3 MG/DL (ref 0.2–1)
BILIRUB UR QL: NEGATIVE
BUN SERPL-MCNC: 23 MG/DL (ref 6–20)
BUN/CREAT SERPL: 26 (ref 12–20)
CALCIUM SERPL-MCNC: 8.9 MG/DL (ref 8.5–10.1)
CHLORIDE SERPL-SCNC: 112 MMOL/L (ref 97–108)
CO2 SERPL-SCNC: 24 MMOL/L (ref 21–32)
COLOR UR: ABNORMAL
CREAT SERPL-MCNC: 0.87 MG/DL (ref 0.55–1.02)
DIFFERENTIAL METHOD BLD: ABNORMAL
EOSINOPHIL # BLD: 0.2 K/UL (ref 0–0.4)
EOSINOPHIL NFR BLD: 2 % (ref 0–7)
EPITH CASTS URNS QL MICRO: ABNORMAL /LPF
ERYTHROCYTE [DISTWIDTH] IN BLOOD BY AUTOMATED COUNT: 11.7 % (ref 11.5–14.5)
GLOBULIN SER CALC-MCNC: 4 G/DL (ref 2–4)
GLUCOSE SERPL-MCNC: 104 MG/DL (ref 65–100)
GLUCOSE UR STRIP.AUTO-MCNC: NEGATIVE MG/DL
HCT VFR BLD AUTO: 43.3 % (ref 35–47)
HGB BLD-MCNC: 14 G/DL (ref 11.5–16)
HGB UR QL STRIP: ABNORMAL
HYALINE CASTS URNS QL MICRO: ABNORMAL /LPF (ref 0–2)
IMM GRANULOCYTES # BLD AUTO: 0 K/UL (ref 0–0.04)
IMM GRANULOCYTES NFR BLD AUTO: 1 % (ref 0–0.5)
KETONES UR QL STRIP.AUTO: NEGATIVE MG/DL
LEUKOCYTE ESTERASE UR QL STRIP.AUTO: ABNORMAL
LYMPHOCYTES # BLD: 1.9 K/UL (ref 0.8–3.5)
LYMPHOCYTES NFR BLD: 21 % (ref 12–49)
MAGNESIUM SERPL-MCNC: 2.2 MG/DL (ref 1.6–2.4)
MCH RBC QN AUTO: 30.4 PG (ref 26–34)
MCHC RBC AUTO-ENTMCNC: 32.3 G/DL (ref 30–36.5)
MCV RBC AUTO: 93.9 FL (ref 80–99)
MONOCYTES # BLD: 1.1 K/UL (ref 0–1)
MONOCYTES NFR BLD: 13 % (ref 5–13)
NEUTS SEG # BLD: 5.4 K/UL (ref 1.8–8)
NEUTS SEG NFR BLD: 62 % (ref 32–75)
NITRITE UR QL STRIP.AUTO: NEGATIVE
NRBC # BLD: 0 K/UL (ref 0–0.01)
NRBC BLD-RTO: 0 PER 100 WBC
PH UR STRIP: 5.5 (ref 5–8)
PLATELET # BLD AUTO: 229 K/UL (ref 150–400)
PMV BLD AUTO: 11.5 FL (ref 8.9–12.9)
POTASSIUM SERPL-SCNC: 4.1 MMOL/L (ref 3.5–5.1)
PROT SERPL-MCNC: 7 G/DL (ref 6.4–8.2)
PROT UR STRIP-MCNC: NEGATIVE MG/DL
RBC # BLD AUTO: 4.61 M/UL (ref 3.8–5.2)
RBC #/AREA URNS HPF: ABNORMAL /HPF (ref 0–5)
SODIUM SERPL-SCNC: 140 MMOL/L (ref 136–145)
SP GR UR REFRACTOMETRY: 1.01 (ref 1–1.03)
TROPONIN I SERPL HS-MCNC: 17 NG/L (ref 0–51)
URINE CULTURE IF INDICATED: ABNORMAL
UROBILINOGEN UR QL STRIP.AUTO: 0.2 EU/DL (ref 0.2–1)
WBC # BLD AUTO: 8.6 K/UL (ref 3.6–11)
WBC URNS QL MICRO: ABNORMAL /HPF (ref 0–4)

## 2023-11-15 PROCEDURE — 99284 EMERGENCY DEPT VISIT MOD MDM: CPT

## 2023-11-15 PROCEDURE — 85025 COMPLETE CBC W/AUTO DIFF WBC: CPT

## 2023-11-15 PROCEDURE — 84484 ASSAY OF TROPONIN QUANT: CPT

## 2023-11-15 PROCEDURE — 93005 ELECTROCARDIOGRAM TRACING: CPT | Performed by: EMERGENCY MEDICINE

## 2023-11-15 PROCEDURE — 6370000000 HC RX 637 (ALT 250 FOR IP): Performed by: EMERGENCY MEDICINE

## 2023-11-15 PROCEDURE — 80053 COMPREHEN METABOLIC PANEL: CPT

## 2023-11-15 PROCEDURE — 72125 CT NECK SPINE W/O DYE: CPT

## 2023-11-15 PROCEDURE — 70450 CT HEAD/BRAIN W/O DYE: CPT

## 2023-11-15 PROCEDURE — 83735 ASSAY OF MAGNESIUM: CPT

## 2023-11-15 PROCEDURE — 81001 URINALYSIS AUTO W/SCOPE: CPT

## 2023-11-15 PROCEDURE — 36415 COLL VENOUS BLD VENIPUNCTURE: CPT

## 2023-11-15 RX ORDER — ACETAMINOPHEN 500 MG
1000 TABLET ORAL
Status: COMPLETED | OUTPATIENT
Start: 2023-11-15 | End: 2023-11-15

## 2023-11-15 RX ADMIN — ACETAMINOPHEN 1000 MG: 500 TABLET ORAL at 18:43

## 2023-11-15 ASSESSMENT — ENCOUNTER SYMPTOMS
ABDOMINAL PAIN: 0
COLOR CHANGE: 0
NAUSEA: 0
DIARRHEA: 0
SHORTNESS OF BREATH: 0
VISUAL CHANGE: 0
VOMITING: 0
BOWEL INCONTINENCE: 0
BACK PAIN: 0
CONSTIPATION: 0

## 2023-11-15 ASSESSMENT — LIFESTYLE VARIABLES
HOW MANY STANDARD DRINKS CONTAINING ALCOHOL DO YOU HAVE ON A TYPICAL DAY: PATIENT DOES NOT DRINK
HOW OFTEN DO YOU HAVE A DRINK CONTAINING ALCOHOL: NEVER

## 2023-11-15 NOTE — ED TRIAGE NOTES
Pt to ed via ems from nursing home after a glf. C/o head pain. Denies any loc. Ems reports hematoma to the back of the head.

## 2023-11-15 NOTE — ED PROVIDER NOTES
[facility] EMERGENCY DEPT  EMERGENCY DEPARTMENT ENCOUNTER      Pt Name: Ambrose Carolina  MRN: [id number]  [address] [facility] 9/24/1933  Date of evaluation: 11/15/2023  Provider: Ruddy Patrick MD    CHIEF COMPLAINT       Chief Complaint   Patient presents with    EMS GLF    Fall         HISTORY OF PRESENT ILLNESS   (Location/Symptom, Timing/Onset, Context/Setting, Quality, Duration, Modifying Factors, Severity)  Note limiting factors. Ambrose Carolina is a 80 y.o. female who presents to the emergency department      The history is provided by the patient and the EMS personnel. No  was used. Fall  The accident occurred Less than 1 hour ago. The fall occurred while walking. She landed on A hard floor. There was no blood loss. The point of impact was the head. The pain is present in the head and neck. The pain is moderate. She was Ambulatory at the scene. There was No entrapment after the fall. Associated symptoms include headaches. Pertinent negatives include no visual change, no fever, no numbness, no abdominal pain, no bowel incontinence, no nausea, no vomiting, no hematuria, no hearing loss, no loss of consciousness and no tingling. She has tried nothing for the symptoms. Nursing Notes were reviewed. REVIEW OF SYSTEMS    (2-9 systems for level 4, 10 or more for level 5)     Review of Systems   Constitutional:  Negative for activity change, chills and fever. HENT:  Negative for nosebleeds. Eyes:  Negative for visual disturbance. Respiratory:  Negative for shortness of breath. Cardiovascular:  Negative for chest pain and palpitations. Gastrointestinal:  Negative for abdominal pain, bowel incontinence, constipation, diarrhea, nausea and vomiting. Genitourinary:  Negative for difficulty urinating, dysuria, hematuria and urgency. Musculoskeletal:  Positive for neck pain. Negative for back pain and neck stiffness. Skin:  Negative for color change.    Allergic/Immunologic: Negative for

## 2023-11-16 NOTE — ED NOTES
I have reviewed discharge instructions with the patient and caregiver. Opportunity for questions and clarification was provided. The patient and caregiver verbalized understanding. Patient discharged out of the ED via W/C with mild difficulty and in stable condition.        Zoey Hendricks  11/15/23 2024

## 2023-11-16 NOTE — ED NOTES
Verbal shift change report given to [place] (oncoming nurse) by Eze Oneal (offgoing nurse). Report included the following information ED Encounter Summary, ED SBAR, MAR, and Recent Results.         Desmond Bolivar RN  11/15/23 5803

## 2023-11-17 LAB
EKG ATRIAL RATE: 75 BPM
EKG DIAGNOSIS: NORMAL
EKG P AXIS: 62 DEGREES
EKG P-R INTERVAL: 160 MS
EKG Q-T INTERVAL: 382 MS
EKG QRS DURATION: 64 MS
EKG QTC CALCULATION (BAZETT): 426 MS
EKG R AXIS: 14 DEGREES
EKG T AXIS: 84 DEGREES
EKG VENTRICULAR RATE: 75 BPM

## 2023-11-17 PROCEDURE — 93010 ELECTROCARDIOGRAM REPORT: CPT | Performed by: SPECIALIST

## 2023-11-20 ENCOUNTER — HOSPITAL ENCOUNTER (EMERGENCY)
Facility: HOSPITAL | Age: 88
Discharge: HOME | End: 2023-11-20
Attending: EMERGENCY MEDICINE
Payer: MEDICARE

## 2023-11-20 ENCOUNTER — APPOINTMENT (OUTPATIENT)
Facility: HOSPITAL | Age: 88
End: 2023-11-20
Payer: MEDICARE

## 2023-11-20 VITALS
OXYGEN SATURATION: 92 % | BODY MASS INDEX: 23.3 KG/M2 | HEIGHT: 66 IN | RESPIRATION RATE: 14 BRPM | HEART RATE: 65 BPM | TEMPERATURE: 97.9 F | SYSTOLIC BLOOD PRESSURE: 149 MMHG | DIASTOLIC BLOOD PRESSURE: 54 MMHG | WEIGHT: 145 LBS

## 2023-11-20 DIAGNOSIS — S70.11XA CONTUSION OF RIGHT HIP AND THIGH, INITIAL ENCOUNTER: ICD-10-CM

## 2023-11-20 DIAGNOSIS — S80.01XA CONTUSION OF RIGHT KNEE, INITIAL ENCOUNTER: ICD-10-CM

## 2023-11-20 DIAGNOSIS — S70.01XA CONTUSION OF RIGHT HIP AND THIGH, INITIAL ENCOUNTER: ICD-10-CM

## 2023-11-20 DIAGNOSIS — S16.1XXA ACUTE STRAIN OF NECK MUSCLE, INITIAL ENCOUNTER: ICD-10-CM

## 2023-11-20 DIAGNOSIS — S09.90XA INJURY OF HEAD, INITIAL ENCOUNTER: ICD-10-CM

## 2023-11-20 DIAGNOSIS — R29.6 FREQUENT FALLS: Primary | ICD-10-CM

## 2023-11-20 LAB
GLUCOSE BLD STRIP.AUTO-MCNC: 117 MG/DL (ref 65–117)
SERVICE CMNT-IMP: NORMAL

## 2023-11-20 PROCEDURE — 6370000000 HC RX 637 (ALT 250 FOR IP): Performed by: EMERGENCY MEDICINE

## 2023-11-20 PROCEDURE — 72125 CT NECK SPINE W/O DYE: CPT

## 2023-11-20 PROCEDURE — 70450 CT HEAD/BRAIN W/O DYE: CPT

## 2023-11-20 PROCEDURE — 82962 GLUCOSE BLOOD TEST: CPT

## 2023-11-20 PROCEDURE — 99284 EMERGENCY DEPT VISIT MOD MDM: CPT

## 2023-11-20 PROCEDURE — 72192 CT PELVIS W/O DYE: CPT

## 2023-11-20 PROCEDURE — 73562 X-RAY EXAM OF KNEE 3: CPT

## 2023-11-20 RX ORDER — ACETAMINOPHEN 500 MG
1000 TABLET ORAL
Qty: 30 TABLET | Refills: 0 | Status: SHIPPED | OUTPATIENT
Start: 2023-11-20

## 2023-11-20 RX ORDER — ACETAMINOPHEN 500 MG
1000 TABLET ORAL
Status: COMPLETED | OUTPATIENT
Start: 2023-11-20 | End: 2023-11-20

## 2023-11-20 RX ORDER — IBUPROFEN 600 MG/1
600 TABLET ORAL 4 TIMES DAILY PRN
Qty: 30 TABLET | Refills: 1 | Status: SHIPPED | OUTPATIENT
Start: 2023-11-20

## 2023-11-20 RX ADMIN — ACETAMINOPHEN 1000 MG: 500 TABLET ORAL at 01:20

## 2023-11-20 ASSESSMENT — PAIN DESCRIPTION - DESCRIPTORS: DESCRIPTORS: ACHING

## 2023-11-20 ASSESSMENT — PAIN SCALES - GENERAL
PAINLEVEL_OUTOF10: 3
PAINLEVEL_OUTOF10: 3

## 2023-11-20 ASSESSMENT — PAIN - FUNCTIONAL ASSESSMENT: PAIN_FUNCTIONAL_ASSESSMENT: 0-10

## 2023-11-20 ASSESSMENT — PAIN DESCRIPTION - LOCATION: LOCATION: HEAD

## 2023-11-20 NOTE — ED TRIAGE NOTES
Pt arrived via ems from a nursing facility where she fell about 30 minutes ago from trying to get up to use the bathroom. The pt denies losing any type of consciousness from the fall.  EMS reported that she has a hematoma from the fall

## 2023-11-20 NOTE — ED NOTES
I have reviewed discharge instructions with the patient and family. Opportunity for questions and clarification was provided. The patient and family verbalized understanding. Patient discharged out of the ED via W/C with mild difficulty and in stable condition.         Zoey Harding  11/20/23 8916